# Patient Record
Sex: FEMALE | Race: WHITE | Employment: FULL TIME | URBAN - NONMETROPOLITAN AREA
[De-identification: names, ages, dates, MRNs, and addresses within clinical notes are randomized per-mention and may not be internally consistent; named-entity substitution may affect disease eponyms.]

---

## 2017-11-13 ENCOUNTER — HOSPITAL ENCOUNTER (OUTPATIENT)
Dept: GENERAL RADIOLOGY | Age: 27
Discharge: HOME | End: 2017-11-13
Attending: HOSPITALIST | Admitting: HOSPITALIST

## 2017-11-13 DIAGNOSIS — J93.83 SPONTANEOUS PNEUMOTHORAX: ICD-10-CM

## 2018-08-26 PROBLEM — R10.13 EPIGASTRIC ABDOMINAL PAIN: Status: ACTIVE | Noted: 2018-08-26

## 2018-08-30 ENCOUNTER — CARE COORDINATION (OUTPATIENT)
Dept: CASE MANAGEMENT | Age: 28
End: 2018-08-30

## 2018-08-30 DIAGNOSIS — R10.13 EPIGASTRIC ABDOMINAL PAIN: Primary | ICD-10-CM

## 2018-08-30 PROCEDURE — 1111F DSCHRG MED/CURRENT MED MERGE: CPT | Performed by: FAMILY MEDICINE

## 2018-08-30 NOTE — CARE COORDINATION
Jorge L 45 Transitions Initial Follow Up Call    Call within 2 business days of discharge:  Yes    Patient: Bala Boogie Patient : 1990   MRN: [id number]  Reason for Admission: There are no discharge diagnoses documented for the most recent discharge. Discharge Date: 18 RARS: Readmission Risk Score: 7     Spoke with:   patient    Facility:  [facility]    Non-face-to-face services provided:  Assessment and support for treatment adherence and [facility]    Care Transitions 24 Hour Call    Schedule Follow Up Appointment with PCP:  Completed  Do you have a copy of your discharge instructions?:  Yes  Do you have all of your prescriptions and are they filled?:  Yes  Have you been contacted by a [facility] Pharmacist?:  No  Have you scheduled your follow up appointment?:  Yes  How are you going to get to your appointment?:  Car - family or friend to transport  Were you discharged with any Home Care or Post Acute Services:  No  Do you feel like you have everything you need to keep you well at home?:  Yes  Care Transitions Interventions  No Identified Needs         Follow Up:  Spoke with patient. Reports that her stomach feels better. Denies nausea. Reports improved appetite. Patient reports that she is passing gas but is having some trouble getting her bowels to move. Encouraged increased fluid intake. Instructed on dietary changes to promote regularity. Instructed patient to contact her physician to discuss OTC stool softener, laxatives if needed. Reviewed discharge instructions and medications. Medication reconciliation completed. Patient denies any questions in regard to her medications. Patient reports that she is independent with her ADL's and that her care needs are being met. Patient has a follow up appointment with her PCP and has no issue with transportation. Denies any questions, equipment or resource needs. Denied the need for continued Care Transition. Confirmed that patient has CTC contact information. Encouraged her to call if needs arise. No further outreach planned.         Future Appointments  Date Time Provider Manoj Aliyah   9/6/2018 9:30 AM Yeison Chaudhry MD [facility]  PEDS FINN Daly RN

## 2018-09-05 ENCOUNTER — OFFICE VISIT (OUTPATIENT)
Dept: FAMILY MEDICINE CLINIC | Age: 28
End: 2018-09-05

## 2018-09-05 VITALS
DIASTOLIC BLOOD PRESSURE: 72 MMHG | BODY MASS INDEX: 19.33 KG/M2 | RESPIRATION RATE: 20 BRPM | HEART RATE: 52 BPM | OXYGEN SATURATION: 98 % | WEIGHT: 102.4 LBS | SYSTOLIC BLOOD PRESSURE: 110 MMHG | HEIGHT: 61 IN

## 2018-09-05 DIAGNOSIS — B16.9 ACUTE HEPATITIS B: ICD-10-CM

## 2018-09-05 DIAGNOSIS — R17 JAUNDICE: ICD-10-CM

## 2018-09-05 DIAGNOSIS — R11.14 BILIOUS VOMITING WITH NAUSEA: ICD-10-CM

## 2018-09-05 DIAGNOSIS — B16.9 ACUTE VIRAL HEPATITIS B WITHOUT COMA AND WITHOUT DELTA AGENT: Primary | ICD-10-CM

## 2018-09-05 DIAGNOSIS — Z23 NEED FOR PROPHYLACTIC VACCINATION AGAINST STREPTOCOCCUS PNEUMONIAE (PNEUMOCOCCUS): ICD-10-CM

## 2018-09-05 DIAGNOSIS — Z23 NEEDS FLU SHOT: ICD-10-CM

## 2018-09-05 LAB
A/G RATIO: 1.2 (ref 1.1–2.2)
ALBUMIN SERPL-MCNC: 3.2 G/DL (ref 3.4–5)
ALP BLD-CCNC: 221 U/L (ref 40–129)
ALT SERPL-CCNC: 728 U/L (ref 10–40)
ANION GAP SERPL CALCULATED.3IONS-SCNC: 14 MMOL/L (ref 3–16)
AST SERPL-CCNC: 1121 U/L (ref 15–37)
BILIRUB SERPL-MCNC: 24.2 MG/DL (ref 0–1)
BUN BLDV-MCNC: 6 MG/DL (ref 7–20)
CALCIUM SERPL-MCNC: 9.1 MG/DL (ref 8.3–10.6)
CHLORIDE BLD-SCNC: 94 MMOL/L (ref 99–110)
CO2: 28 MMOL/L (ref 21–32)
CREAT SERPL-MCNC: <0.5 MG/DL (ref 0.6–1.1)
GFR AFRICAN AMERICAN: >60
GFR NON-AFRICAN AMERICAN: >60
GLOBULIN: 2.6 G/DL
GLUCOSE BLD-MCNC: 92 MG/DL (ref 70–99)
HCT VFR BLD CALC: 42.4 % (ref 36–48)
HEMOGLOBIN: 14.4 G/DL (ref 12–16)
MCH RBC QN AUTO: 32.2 PG (ref 26–34)
MCHC RBC AUTO-ENTMCNC: 33.9 G/DL (ref 31–36)
MCV RBC AUTO: 95 FL (ref 80–100)
PDW BLD-RTO: 16.8 % (ref 12.4–15.4)
PLATELET # BLD: 170 K/UL (ref 135–450)
PMV BLD AUTO: 10.9 FL (ref 5–10.5)
POTASSIUM SERPL-SCNC: 3.7 MMOL/L (ref 3.5–5.1)
RBC # BLD: 4.47 M/UL (ref 4–5.2)
SODIUM BLD-SCNC: 136 MMOL/L (ref 136–145)
TOTAL PROTEIN: 5.8 G/DL (ref 6.4–8.2)
WBC # BLD: 6.6 K/UL (ref 4–11)

## 2018-09-05 PROCEDURE — G8427 DOCREV CUR MEDS BY ELIG CLIN: HCPCS | Performed by: NURSE PRACTITIONER

## 2018-09-05 PROCEDURE — 90472 IMMUNIZATION ADMIN EACH ADD: CPT | Performed by: NURSE PRACTITIONER

## 2018-09-05 PROCEDURE — G8420 CALC BMI NORM PARAMETERS: HCPCS | Performed by: NURSE PRACTITIONER

## 2018-09-05 PROCEDURE — 99203 OFFICE O/P NEW LOW 30 MIN: CPT | Performed by: NURSE PRACTITIONER

## 2018-09-05 PROCEDURE — 1111F DSCHRG MED/CURRENT MED MERGE: CPT | Performed by: NURSE PRACTITIONER

## 2018-09-05 PROCEDURE — 90732 PPSV23 VACC 2 YRS+ SUBQ/IM: CPT | Performed by: NURSE PRACTITIONER

## 2018-09-05 PROCEDURE — 4004F PT TOBACCO SCREEN RCVD TLK: CPT | Performed by: NURSE PRACTITIONER

## 2018-09-05 PROCEDURE — 90471 IMMUNIZATION ADMIN: CPT | Performed by: NURSE PRACTITIONER

## 2018-09-05 PROCEDURE — 90688 IIV4 VACCINE SPLT 0.5 ML IM: CPT | Performed by: NURSE PRACTITIONER

## 2018-09-05 PROCEDURE — 96372 THER/PROPH/DIAG INJ SC/IM: CPT | Performed by: NURSE PRACTITIONER

## 2018-09-05 RX ORDER — PROMETHAZINE HYDROCHLORIDE 25 MG/ML
12.5 INJECTION, SOLUTION INTRAMUSCULAR; INTRAVENOUS ONCE
Status: COMPLETED | OUTPATIENT
Start: 2018-09-05 | End: 2018-09-05

## 2018-09-05 RX ORDER — PROMETHAZINE HYDROCHLORIDE 25 MG/1
25 TABLET ORAL EVERY 8 HOURS PRN
Qty: 30 TABLET | Refills: 0 | Status: SHIPPED | OUTPATIENT
Start: 2018-09-05 | End: 2018-09-12

## 2018-09-05 RX ORDER — ONDANSETRON HYDROCHLORIDE 8 MG/1
8 TABLET, FILM COATED ORAL EVERY 8 HOURS PRN
Qty: 60 TABLET | Refills: 0 | Status: SHIPPED | OUTPATIENT
Start: 2018-09-05 | End: 2019-01-22

## 2018-09-05 RX ADMIN — PROMETHAZINE HYDROCHLORIDE 12.5 MG: 25 INJECTION, SOLUTION INTRAMUSCULAR; INTRAVENOUS at 11:33

## 2018-09-05 ASSESSMENT — ENCOUNTER SYMPTOMS
SHORTNESS OF BREATH: 0
COUGH: 0
NAUSEA: 1
BLOOD IN STOOL: 0
ABDOMINAL PAIN: 1
VOMITING: 1
WHEEZING: 0

## 2018-09-05 ASSESSMENT — PATIENT HEALTH QUESTIONNAIRE - PHQ9
SUM OF ALL RESPONSES TO PHQ QUESTIONS 1-9: 0
2. FEELING DOWN, DEPRESSED OR HOPELESS: 0
SUM OF ALL RESPONSES TO PHQ9 QUESTIONS 1 & 2: 0
1. LITTLE INTEREST OR PLEASURE IN DOING THINGS: 0
SUM OF ALL RESPONSES TO PHQ QUESTIONS 1-9: 0

## 2018-09-05 NOTE — PROGRESS NOTES
(ZOFRAN) 8 MG tablet Take 1 tablet by mouth every 8 hours as needed for Nausea or Vomiting 60 tablet 0    ibuprofen (ADVIL;MOTRIN) 400 MG tablet Take 1 tablet by mouth every 6 hours as needed for Pain or Fever 120 tablet 3     No current facility-administered medications for this visit. 1. Need for prophylactic vaccination against Streptococcus pneumoniae (pneumococcus)  given  - Pneumococcal polysaccharide vaccine 23-valent PPSV23    2. Acute viral hepatitis B without coma and without delta agent  Labs today  - CBC  - Comprehensive Metabolic Panel    3. Needs flu shot  given  - INFLUENZA, QUADV, 3 YRS AND OLDER, IM, MDV, 0.5ML (FLUZONE QUADV)    4. Bilious vomiting with nausea  Phenergan in office, phenergan at home. In crease zofran to 8mg  - promethazine (PHENERGAN) 25 MG tablet; Take 1 tablet by mouth every 8 hours as needed for Nausea  Dispense: 30 tablet; Refill: 0  - promethazine (PHENERGAN) injection 12.5 mg; Inject 0.5 mLs into the muscle once  - ondansetron (ZOFRAN) 8 MG tablet; Take 1 tablet by mouth every 8 hours as needed for Nausea or Vomiting  Dispense: 60 tablet; Refill: 0      5. Jaundice  Push fluids. FU with gi      Return in about 1 week (around 9/12/2018) for With primary care provider abd pain. Controlled substance monitoring (if applicable to pt.  Visit)             (Please note that portions of this note may have been completed with a voice recognition program. Efforts were made to edit the dictations but occasionally words are mis-transcribed.)

## 2018-09-05 NOTE — ASSESSMENT & PLAN NOTE
luther yellow- skin sallow/yellow    Patient reports that she has been having these symptoms for more than a week and a half. She sought care in the emergency department because \"my eyes were yellow\".   Where she was diagnosed with hepatitis B

## 2018-09-07 DIAGNOSIS — B16.9 ACUTE VIRAL HEPATITIS B WITHOUT COMA AND WITHOUT DELTA AGENT: Primary | ICD-10-CM

## 2018-09-07 DIAGNOSIS — B16.9 ACUTE VIRAL HEPATITIS B WITHOUT COMA AND WITHOUT DELTA AGENT: ICD-10-CM

## 2018-09-07 LAB
A/G RATIO: 1.7 (ref 1.1–2.2)
ALBUMIN SERPL-MCNC: 3.3 G/DL (ref 3.4–5)
ALP BLD-CCNC: 207 U/L (ref 40–129)
ALT SERPL-CCNC: 608 U/L (ref 10–40)
ANION GAP SERPL CALCULATED.3IONS-SCNC: 14 MMOL/L (ref 3–16)
AST SERPL-CCNC: 1056 U/L (ref 15–37)
BASOPHILS ABSOLUTE: 0.1 K/UL (ref 0–0.2)
BASOPHILS RELATIVE PERCENT: 1.3 %
BILIRUB SERPL-MCNC: 24.1 MG/DL (ref 0–1)
BILIRUBIN DIRECT: >10 MG/DL (ref 0–0.3)
BILIRUBIN, INDIRECT: ABNORMAL MG/DL (ref 0–1)
BUN BLDV-MCNC: 6 MG/DL (ref 7–20)
C-REACTIVE PROTEIN: 4.8 MG/L (ref 0–5.1)
CALCIUM SERPL-MCNC: 8.9 MG/DL (ref 8.3–10.6)
CHLORIDE BLD-SCNC: 98 MMOL/L (ref 99–110)
CO2: 27 MMOL/L (ref 21–32)
CREAT SERPL-MCNC: <0.5 MG/DL (ref 0.6–1.1)
EOSINOPHILS ABSOLUTE: 0.2 K/UL (ref 0–0.6)
EOSINOPHILS RELATIVE PERCENT: 2.5 %
GFR AFRICAN AMERICAN: >60
GFR NON-AFRICAN AMERICAN: >60
GLOBULIN: 2 G/DL
GLUCOSE BLD-MCNC: 96 MG/DL (ref 70–99)
HCT VFR BLD CALC: 41 % (ref 36–48)
HEMOGLOBIN: 14.2 G/DL (ref 12–16)
LYMPHOCYTES ABSOLUTE: 1.3 K/UL (ref 1–5.1)
LYMPHOCYTES RELATIVE PERCENT: 18.5 %
MCH RBC QN AUTO: 32.9 PG (ref 26–34)
MCHC RBC AUTO-ENTMCNC: 34.5 G/DL (ref 31–36)
MCV RBC AUTO: 95.3 FL (ref 80–100)
MONOCYTES ABSOLUTE: 0.7 K/UL (ref 0–1.3)
MONOCYTES RELATIVE PERCENT: 10.8 %
NEUTROPHILS ABSOLUTE: 4.5 K/UL (ref 1.7–7.7)
NEUTROPHILS RELATIVE PERCENT: 66.9 %
PDW BLD-RTO: 16.8 % (ref 12.4–15.4)
PLATELET # BLD: 169 K/UL (ref 135–450)
PMV BLD AUTO: 10.7 FL (ref 5–10.5)
POTASSIUM SERPL-SCNC: 3.6 MMOL/L (ref 3.5–5.1)
RBC # BLD: 4.31 M/UL (ref 4–5.2)
SODIUM BLD-SCNC: 139 MMOL/L (ref 136–145)
TOTAL PROTEIN: 5.3 G/DL (ref 6.4–8.2)
WBC # BLD: 6.8 K/UL (ref 4–11)

## 2018-09-12 ENCOUNTER — OFFICE VISIT (OUTPATIENT)
Dept: FAMILY MEDICINE CLINIC | Age: 28
End: 2018-09-12

## 2018-09-12 VITALS
RESPIRATION RATE: 20 BRPM | BODY MASS INDEX: 19.69 KG/M2 | DIASTOLIC BLOOD PRESSURE: 74 MMHG | WEIGHT: 104.2 LBS | OXYGEN SATURATION: 98 % | SYSTOLIC BLOOD PRESSURE: 110 MMHG | HEART RATE: 78 BPM

## 2018-09-12 DIAGNOSIS — B16.9 ACUTE VIRAL HEPATITIS B WITHOUT COMA AND WITHOUT DELTA AGENT: Primary | ICD-10-CM

## 2018-09-12 DIAGNOSIS — R10.13 EPIGASTRIC ABDOMINAL PAIN: ICD-10-CM

## 2018-09-12 DIAGNOSIS — R89.9 ABNORMAL LABORATORY TEST RESULT: Primary | ICD-10-CM

## 2018-09-12 DIAGNOSIS — R11.14 BILIOUS VOMITING WITH NAUSEA: ICD-10-CM

## 2018-09-12 DIAGNOSIS — R17 JAUNDICE: ICD-10-CM

## 2018-09-12 LAB
A/G RATIO: 1.6 (ref 1.1–2.2)
ALBUMIN SERPL-MCNC: 3.1 G/DL (ref 3.4–5)
ALP BLD-CCNC: 194 U/L (ref 40–129)
ALT SERPL-CCNC: 595 U/L (ref 10–40)
ANION GAP SERPL CALCULATED.3IONS-SCNC: 13 MMOL/L (ref 3–16)
AST SERPL-CCNC: 1009 U/L (ref 15–37)
BILIRUB SERPL-MCNC: 27.5 MG/DL (ref 0–1)
BILIRUBIN DIRECT: >10 MG/DL (ref 0–0.3)
BILIRUBIN, INDIRECT: ABNORMAL MG/DL (ref 0–1)
BUN BLDV-MCNC: 4 MG/DL (ref 7–20)
C-REACTIVE PROTEIN: 4.2 MG/L (ref 0–5.1)
CALCIUM SERPL-MCNC: 8.7 MG/DL (ref 8.3–10.6)
CHLORIDE BLD-SCNC: 96 MMOL/L (ref 99–110)
CO2: 27 MMOL/L (ref 21–32)
CREAT SERPL-MCNC: <0.5 MG/DL (ref 0.6–1.1)
GFR AFRICAN AMERICAN: >60
GFR NON-AFRICAN AMERICAN: >60
GLOBULIN: 2 G/DL
GLUCOSE BLD-MCNC: 80 MG/DL (ref 70–99)
HCT VFR BLD CALC: 41.9 % (ref 36–48)
HEMOGLOBIN: 14.1 G/DL (ref 12–16)
MCH RBC QN AUTO: 32.3 PG (ref 26–34)
MCHC RBC AUTO-ENTMCNC: 33.5 G/DL (ref 31–36)
MCV RBC AUTO: 96.2 FL (ref 80–100)
PDW BLD-RTO: 17.9 % (ref 12.4–15.4)
PLATELET # BLD: 183 K/UL (ref 135–450)
PLATELET SLIDE REVIEW: ADEQUATE
PMV BLD AUTO: 9.9 FL (ref 5–10.5)
POTASSIUM SERPL-SCNC: 3.4 MMOL/L (ref 3.5–5.1)
RBC # BLD: 4.36 M/UL (ref 4–5.2)
SLIDE REVIEW: ABNORMAL
SODIUM BLD-SCNC: 136 MMOL/L (ref 136–145)
TOTAL PROTEIN: 5.1 G/DL (ref 6.4–8.2)
WBC # BLD: 7 K/UL (ref 4–11)

## 2018-09-12 PROCEDURE — 4004F PT TOBACCO SCREEN RCVD TLK: CPT | Performed by: NURSE PRACTITIONER

## 2018-09-12 PROCEDURE — G8420 CALC BMI NORM PARAMETERS: HCPCS | Performed by: NURSE PRACTITIONER

## 2018-09-12 PROCEDURE — 99213 OFFICE O/P EST LOW 20 MIN: CPT | Performed by: NURSE PRACTITIONER

## 2018-09-12 PROCEDURE — G8427 DOCREV CUR MEDS BY ELIG CLIN: HCPCS | Performed by: NURSE PRACTITIONER

## 2018-09-12 PROCEDURE — 1111F DSCHRG MED/CURRENT MED MERGE: CPT | Performed by: NURSE PRACTITIONER

## 2018-09-12 ASSESSMENT — ENCOUNTER SYMPTOMS
CONSTIPATION: 1
NAUSEA: 1
ABDOMINAL PAIN: 1
PHOTOPHOBIA: 0
DIARRHEA: 0
COUGH: 0
EYES NEGATIVE: 1
SHORTNESS OF BREATH: 0
WHEEZING: 0
VOMITING: 0
RESPIRATORY NEGATIVE: 1

## 2018-09-12 NOTE — PROGRESS NOTES
SUBJECTIVE:    Edi Ache  1990  34 y.o.  female      Chief Complaint   Patient presents with    Other     Pt here for 1 week follow up, Pt is due to have repeat labwork today. Hepatic panel, CBC, CRP, and CMP     HPI    Jaundice  Still having jaundice. But reports less itching of skin since weather is cooler. Epigastric abdominal pain  Patient reports that she has less abdominal pain. It is made worse when she eats a larger meal or something that she has trouble digesting. She is in working on eating smaller more frequent meals and more bland foods for now. This is seemed to help the pain. She's had no vomiting or diarrhea. Bilious vomiting with nausea  Vomiting has improved. Less nausea with smaller more frequent meals. She reports that she has plenty of Phenergan and Zofran at home in case she needs it. Acute viral hepatitis B without coma and without delta agent  Patient reports that she is feeling significantly better. She is here for repeat lab work. She says she has been pushing fluids, getting plenty of rest.  She has been working and feels like she is doing better. She says she feels better now that the weather is cooler and so hot. She has a follow-up appointment scheduled with gastroenterology at the end of September. She is planning to keep that appointment      Current Outpatient Prescriptions on File Prior to Visit   Medication Sig Dispense Refill    promethazine (PHENERGAN) 25 MG tablet Take 1 tablet by mouth every 8 hours as needed for Nausea 30 tablet 0    ondansetron (ZOFRAN) 8 MG tablet Take 1 tablet by mouth every 8 hours as needed for Nausea or Vomiting 60 tablet 0    ibuprofen (ADVIL;MOTRIN) 400 MG tablet Take 1 tablet by mouth every 6 hours as needed for Pain or Fever 120 tablet 3     No current facility-administered medications on file prior to visit. Review of PMH, PSH, Family Hx, Allergies and updates made as needed.         Review of Systems is feeling significantly better. She is here for repeat lab work. She says she has been pushing fluids, getting plenty of rest.  She has been working and feels like she is doing better. She says she feels better now that the weather is cooler and so hot. She has a follow-up appointment scheduled with gastroenterology at the end of September. She is planning to keep that appointment         Relevant Orders    CBC    Comprehensive Metabolic Panel    HEPATIC FUNCTION PANEL    C-REACTIVE PROTEIN    Bilious vomiting with nausea     Vomiting has improved. Less nausea with smaller more frequent meals. She reports that she has plenty of Phenergan and Zofran at home in case she needs it. Relevant Medications    promethazine (PHENERGAN) 25 MG tablet    promethazine (PHENERGAN) injection 12.5 mg (Completed)    ondansetron (ZOFRAN) 8 MG tablet    Epigastric abdominal pain     Patient reports that she has less abdominal pain. It is made worse when she eats a larger meal or something that she has trouble digesting. She is in working on eating smaller more frequent meals and more bland foods for now. This is seemed to help the pain. She's had no vomiting or diarrhea. Jaundice     Still having jaundice. But reports less itching of skin since weather is cooler. Relevant Orders    CBC    Comprehensive Metabolic Panel    HEPATIC FUNCTION PANEL    C-REACTIVE PROTEIN          Current Outpatient Prescriptions   Medication Sig Dispense Refill    promethazine (PHENERGAN) 25 MG tablet Take 1 tablet by mouth every 8 hours as needed for Nausea 30 tablet 0    ondansetron (ZOFRAN) 8 MG tablet Take 1 tablet by mouth every 8 hours as needed for Nausea or Vomiting 60 tablet 0    ibuprofen (ADVIL;MOTRIN) 400 MG tablet Take 1 tablet by mouth every 6 hours as needed for Pain or Fever 120 tablet 3     No current facility-administered medications for this visit.       1. Acute viral hepatitis B without coma and

## 2018-09-17 DIAGNOSIS — R89.9 ABNORMAL LABORATORY TEST RESULT: ICD-10-CM

## 2018-09-17 LAB
HCT VFR BLD CALC: 41 % (ref 36–48)
HEMOGLOBIN: 13.8 G/DL (ref 12–16)
MCH RBC QN AUTO: 32.9 PG (ref 26–34)
MCHC RBC AUTO-ENTMCNC: 33.7 G/DL (ref 31–36)
MCV RBC AUTO: 97.5 FL (ref 80–100)
PDW BLD-RTO: 18.4 % (ref 12.4–15.4)
PLATELET # BLD: 155 K/UL (ref 135–450)
PMV BLD AUTO: 10.3 FL (ref 5–10.5)
RBC # BLD: 4.21 M/UL (ref 4–5.2)
WBC # BLD: 5.7 K/UL (ref 4–11)

## 2018-09-18 DIAGNOSIS — R89.9 ABNORMAL LABORATORY TEST RESULT: Primary | ICD-10-CM

## 2018-09-18 LAB
ALBUMIN SERPL-MCNC: 2.8 G/DL (ref 3.4–5)
ALP BLD-CCNC: 230 U/L (ref 40–129)
ALT SERPL-CCNC: 439 U/L (ref 10–40)
ANION GAP SERPL CALCULATED.3IONS-SCNC: 11 MMOL/L (ref 3–16)
AST SERPL-CCNC: 781 U/L (ref 15–37)
BILIRUB SERPL-MCNC: 23.8 MG/DL (ref 0–1)
BILIRUBIN DIRECT: >10 MG/DL (ref 0–0.3)
BILIRUBIN, INDIRECT: ABNORMAL MG/DL (ref 0–1)
BUN BLDV-MCNC: 6 MG/DL (ref 7–20)
CALCIUM SERPL-MCNC: 8.2 MG/DL (ref 8.3–10.6)
CHLORIDE BLD-SCNC: 102 MMOL/L (ref 99–110)
CO2: 25 MMOL/L (ref 21–32)
CREAT SERPL-MCNC: <0.5 MG/DL (ref 0.6–1.1)
GFR AFRICAN AMERICAN: >60
GFR NON-AFRICAN AMERICAN: >60
GLUCOSE BLD-MCNC: 81 MG/DL (ref 70–99)
POTASSIUM SERPL-SCNC: 3.7 MMOL/L (ref 3.5–5.1)
SODIUM BLD-SCNC: 138 MMOL/L (ref 136–145)
TOTAL PROTEIN: 4.7 G/DL (ref 6.4–8.2)

## 2018-10-10 ENCOUNTER — TELEPHONE (OUTPATIENT)
Dept: FAMILY MEDICINE CLINIC | Age: 28
End: 2018-10-10

## 2018-10-10 ENCOUNTER — OFFICE VISIT (OUTPATIENT)
Dept: FAMILY MEDICINE CLINIC | Age: 28
End: 2018-10-10
Payer: MEDICAID

## 2018-10-10 VITALS
BODY MASS INDEX: 21.23 KG/M2 | HEIGHT: 60 IN | HEART RATE: 48 BPM | RESPIRATION RATE: 16 BRPM | WEIGHT: 108.13 LBS | OXYGEN SATURATION: 100 % | SYSTOLIC BLOOD PRESSURE: 98 MMHG | DIASTOLIC BLOOD PRESSURE: 60 MMHG

## 2018-10-10 DIAGNOSIS — Z23 NEED FOR PROPHYLACTIC VACCINATION AGAINST DIPHTHERIA-TETANUS-PERTUSSIS (DTP): ICD-10-CM

## 2018-10-10 DIAGNOSIS — B16.9 ACUTE VIRAL HEPATITIS B WITHOUT COMA AND WITHOUT DELTA AGENT: ICD-10-CM

## 2018-10-10 DIAGNOSIS — M79.605 LEFT LEG PAIN: Primary | ICD-10-CM

## 2018-10-10 LAB
A/G RATIO: 1.8 (ref 1.1–2.2)
ALBUMIN SERPL-MCNC: 3.7 G/DL (ref 3.4–5)
ALP BLD-CCNC: 117 U/L (ref 40–129)
ALT SERPL-CCNC: 70 U/L (ref 10–40)
ANION GAP SERPL CALCULATED.3IONS-SCNC: 11 MMOL/L (ref 3–16)
AST SERPL-CCNC: 86 U/L (ref 15–37)
BILIRUB SERPL-MCNC: 3.1 MG/DL (ref 0–1)
BUN BLDV-MCNC: 4 MG/DL (ref 7–20)
CALCIUM SERPL-MCNC: 9.1 MG/DL (ref 8.3–10.6)
CHLORIDE BLD-SCNC: 101 MMOL/L (ref 99–110)
CO2: 27 MMOL/L (ref 21–32)
CREAT SERPL-MCNC: 0.7 MG/DL (ref 0.6–1.1)
D DIMER: <200 NG/ML DDU (ref 0–229)
GFR AFRICAN AMERICAN: >60
GFR NON-AFRICAN AMERICAN: >60
GLOBULIN: 2.1 G/DL
GLUCOSE BLD-MCNC: 69 MG/DL (ref 70–99)
HCT VFR BLD CALC: 37.8 % (ref 36–48)
HEMOGLOBIN: 13 G/DL (ref 12–16)
MCH RBC QN AUTO: 34.5 PG (ref 26–34)
MCHC RBC AUTO-ENTMCNC: 34.4 G/DL (ref 31–36)
MCV RBC AUTO: 100.3 FL (ref 80–100)
PDW BLD-RTO: 15.3 % (ref 12.4–15.4)
PLATELET # BLD: 198 K/UL (ref 135–450)
PMV BLD AUTO: 8.8 FL (ref 5–10.5)
POTASSIUM SERPL-SCNC: 4.1 MMOL/L (ref 3.5–5.1)
RBC # BLD: 3.77 M/UL (ref 4–5.2)
SODIUM BLD-SCNC: 139 MMOL/L (ref 136–145)
TOTAL PROTEIN: 5.8 G/DL (ref 6.4–8.2)
WBC # BLD: 7.7 K/UL (ref 4–11)

## 2018-10-10 PROCEDURE — 90715 TDAP VACCINE 7 YRS/> IM: CPT | Performed by: NURSE PRACTITIONER

## 2018-10-10 PROCEDURE — G8420 CALC BMI NORM PARAMETERS: HCPCS | Performed by: NURSE PRACTITIONER

## 2018-10-10 PROCEDURE — 90471 IMMUNIZATION ADMIN: CPT | Performed by: NURSE PRACTITIONER

## 2018-10-10 PROCEDURE — G8482 FLU IMMUNIZE ORDER/ADMIN: HCPCS | Performed by: NURSE PRACTITIONER

## 2018-10-10 PROCEDURE — 99214 OFFICE O/P EST MOD 30 MIN: CPT | Performed by: NURSE PRACTITIONER

## 2018-10-10 PROCEDURE — G8427 DOCREV CUR MEDS BY ELIG CLIN: HCPCS | Performed by: NURSE PRACTITIONER

## 2018-10-10 PROCEDURE — 4004F PT TOBACCO SCREEN RCVD TLK: CPT | Performed by: NURSE PRACTITIONER

## 2018-10-10 ASSESSMENT — ENCOUNTER SYMPTOMS
SHORTNESS OF BREATH: 0
DIARRHEA: 0
NAUSEA: 0
WHEEZING: 0
ABDOMINAL PAIN: 0
RESPIRATORY NEGATIVE: 1
COUGH: 0
VOMITING: 0
GASTROINTESTINAL NEGATIVE: 1

## 2018-10-10 NOTE — PROGRESS NOTES
SUBJECTIVE:    Manuel Burton  1990  34 y.o.  female      Chief Complaint   Patient presents with    Follow-up     Pt is here for 4 week follow up  Hep B     +swelling as well. Leg Pain    The incident occurred 3 to 5 days ago. There was no injury mechanism. The pain is present in the left leg. The quality of the pain is described as aching. The pain is at a severity of 4/10. The pain is mild. The pain has been constant since onset. Pertinent negatives include no inability to bear weight, loss of motion, loss of sensation, muscle weakness, numbness or tingling. She reports no foreign bodies present. The symptoms are aggravated by movement and palpation. She has tried acetaminophen and NSAIDs for the symptoms. The treatment provided mild relief. Feeling much better. States nausea is gone. Has stopped soda and this helped nausea. Acute viral hepatitis B without coma and without delta agent  Feeling much better. Current Outpatient Prescriptions on File Prior to Visit   Medication Sig Dispense Refill    promethazine (PHENERGAN) 25 MG tablet Take 25 mg by mouth every 6 hours as needed for Nausea      ondansetron (ZOFRAN) 8 MG tablet Take 1 tablet by mouth every 8 hours as needed for Nausea or Vomiting (Patient taking differently: Take 4 mg by mouth every 8 hours as needed for Nausea or Vomiting ) 60 tablet 0    ibuprofen (ADVIL;MOTRIN) 400 MG tablet Take 1 tablet by mouth every 6 hours as needed for Pain or Fever 120 tablet 3     No current facility-administered medications on file prior to visit. Review of PMH, PSH, Family Hx, Allergies and updates made as needed. Review of Systems   Constitutional: Negative. Negative for chills, diaphoresis and fever. Respiratory: Negative. Negative for cough, shortness of breath and wheezing. Cardiovascular: Negative. Negative for chest pain, palpitations and leg swelling. Gastrointestinal: Negative.   Negative for abdominal pain, diarrhea, nausea and vomiting. Endocrine: Negative. Genitourinary: Negative. Musculoskeletal: Positive for arthralgias and myalgias. Neurological: Negative. Negative for tingling and numbness. Hematological: Negative for adenopathy. Bruises/bleeds easily. Psychiatric/Behavioral: Negative. Negative for dysphoric mood. The patient is not nervous/anxious. All other systems reviewed and are negative. OBJECTIVE:    BP 98/60 (Site: Right Upper Arm, Position: Sitting, Cuff Size: Medium Adult)   Pulse (!) 48   Resp 16   Ht 5' (1.524 m)   Wt 108 lb 2 oz (49 kg)   SpO2 100%   BMI 21.12 kg/m²     Physical Exam   Constitutional: She is oriented to person, place, and time. She appears well-developed and well-nourished. No distress. HENT:   Head: Normocephalic and atraumatic. Right Ear: External ear normal.   Left Ear: External ear normal.   Nose: Nose normal.   Mouth/Throat: Oropharynx is clear and moist. No oropharyngeal exudate. Eyes: Pupils are equal, round, and reactive to light. Conjunctivae and EOM are normal. Right eye exhibits no discharge. Left eye exhibits no discharge. No scleral icterus. Neck: Normal range of motion. Neck supple. Cardiovascular: Normal rate, regular rhythm, normal heart sounds and intact distal pulses. Exam reveals no gallop and no friction rub. No murmur heard. Pulmonary/Chest: Effort normal and breath sounds normal. No respiratory distress. She has no wheezes. Abdominal: Soft. Bowel sounds are normal. She exhibits no distension. There is no tenderness. There is no rebound. Neurological: She is alert and oriented to person, place, and time. Skin: Skin is warm and dry. She is not diaphoretic. Psychiatric: She has a normal mood and affect. Her behavior is normal. Judgment and thought content normal.   Vitals reviewed.       ASSESSMENT/PLAN:    Problem List     Acute viral hepatitis B without coma and without delta agent     Feeling

## 2018-10-12 ENCOUNTER — HOSPITAL ENCOUNTER (OUTPATIENT)
Dept: ULTRASOUND IMAGING | Age: 28
Discharge: HOME | End: 2018-10-12
Payer: MEDICAID

## 2018-10-12 DIAGNOSIS — M79.605 LEFT LEG PAIN: ICD-10-CM

## 2018-10-12 PROCEDURE — 93971 EXTREMITY STUDY: CPT

## 2018-10-31 ENCOUNTER — HOSPITAL ENCOUNTER (OUTPATIENT)
Age: 28
Discharge: HOME | End: 2018-10-31
Payer: MEDICAID

## 2018-10-31 DIAGNOSIS — R89.9 ABNORMAL LABORATORY TEST RESULT: ICD-10-CM

## 2018-10-31 DIAGNOSIS — B16.9 ACUTE VIRAL HEPATITIS B WITHOUT COMA AND WITHOUT DELTA AGENT: ICD-10-CM

## 2018-10-31 LAB
ALBUMIN SERPL-MCNC: 3.8 GM/DL (ref 3.4–5)
ALP BLD-CCNC: 75 IU/L (ref 40–129)
ALT SERPL-CCNC: 27 U/L (ref 10–40)
ANION GAP SERPL CALCULATED.3IONS-SCNC: 7 MMOL/L (ref 4–16)
AST SERPL-CCNC: 31 IU/L (ref 15–37)
BACTERIA: ABNORMAL /HPF
BASOPHILS ABSOLUTE: 0 K/CU MM
BASOPHILS RELATIVE PERCENT: 0.6 % (ref 0–1)
BILIRUB SERPL-MCNC: 1.3 MG/DL (ref 0–1)
BILIRUBIN DIRECT: 0.6 MG/DL (ref 0–0.3)
BILIRUBIN URINE: NEGATIVE MG/DL
BLOOD, URINE: ABNORMAL
BUN BLDV-MCNC: 3 MG/DL (ref 6–23)
CALCIUM SERPL-MCNC: 9.2 MG/DL (ref 8.3–10.6)
CAST TYPE: ABNORMAL /HPF
CHLORIDE BLD-SCNC: 101 MMOL/L (ref 99–110)
CLARITY: CLEAR
CO2: 30 MMOL/L (ref 21–32)
COLOR: YELLOW
CREAT SERPL-MCNC: 0.8 MG/DL (ref 0.6–1.1)
CREATININE URINE: 89.3 MG/DL (ref 28–217)
CRYSTAL TYPE: ABNORMAL /HPF
DIFFERENTIAL TYPE: ABNORMAL
EOSINOPHILS ABSOLUTE: 0.1 K/CU MM
EOSINOPHILS RELATIVE PERCENT: 2.1 % (ref 0–3)
EPITHELIAL CELLS, UA: ABNORMAL /HPF
GFR AFRICAN AMERICAN: >60 ML/MIN/1.73M2
GFR NON-AFRICAN AMERICAN: >60 ML/MIN/1.73M2
GLUCOSE FASTING: 87 MG/DL (ref 70–99)
GLUCOSE, URINE: NEGATIVE MG/DL
HCT VFR BLD CALC: 40.8 % (ref 37–47)
HEMOGLOBIN: 13.6 GM/DL (ref 12.5–16)
IMMATURE NEUTROPHIL %: 0.7 % (ref 0–0.43)
INR BLD: 1.06 INDEX
KETONES, URINE: NEGATIVE MG/DL
LEUKOCYTE ESTERASE, URINE: NEGATIVE
LYMPHOCYTES ABSOLUTE: 2.1 K/CU MM
LYMPHOCYTES RELATIVE PERCENT: 31.4 % (ref 24–44)
MCH RBC QN AUTO: 33.1 PG (ref 27–31)
MCHC RBC AUTO-ENTMCNC: 33.3 % (ref 32–36)
MCV RBC AUTO: 99.3 FL (ref 78–100)
MONOCYTES ABSOLUTE: 0.5 K/CU MM
MONOCYTES RELATIVE PERCENT: 6.8 % (ref 0–4)
MUCUS: NEGATIVE HPF
NITRITE URINE, QUANTITATIVE: NEGATIVE
PDW BLD-RTO: 12.4 % (ref 11.7–14.9)
PH, URINE: 6 (ref 5–8)
PLATELET # BLD: 191 K/CU MM (ref 140–440)
PMV BLD AUTO: 10.2 FL (ref 7.5–11.1)
POTASSIUM SERPL-SCNC: 3.9 MMOL/L (ref 3.5–5.1)
PROT/CREAT RATIO, UR: 0.1
PROTEIN UA: NEGATIVE MG/DL
PROTHROMBIN TIME: 12.1 SECONDS (ref 9.12–12.5)
RBC # BLD: 4.11 M/CU MM (ref 4.2–5.4)
RBC URINE: ABNORMAL /HPF (ref 0–6)
SEGMENTED NEUTROPHILS ABSOLUTE COUNT: 4 K/CU MM
SEGMENTED NEUTROPHILS RELATIVE PERCENT: 58.4 % (ref 36–66)
SODIUM BLD-SCNC: 138 MMOL/L (ref 135–145)
SPECIFIC GRAVITY UA: 1.01 (ref 1–1.03)
TOTAL IMMATURE NEUTOROPHIL: 0.05 K/CU MM
TOTAL PROTEIN: 6.3 GM/DL (ref 6.4–8.2)
URINE TOTAL PROTEIN: 6.6 MG/DL
UROBILINOGEN, URINE: 0.2 MG/DL (ref 0.2–1)
VOLUME, (UVOL): 12 ML (ref 10–12)
WBC # BLD: 6.8 K/CU MM (ref 4–10.5)
WBC UA: ABNORMAL /HPF (ref 0–5)

## 2018-10-31 PROCEDURE — 85610 PROTHROMBIN TIME: CPT

## 2018-10-31 PROCEDURE — 82570 ASSAY OF URINE CREATININE: CPT

## 2018-10-31 PROCEDURE — 85025 COMPLETE CBC W/AUTO DIFF WBC: CPT

## 2018-10-31 PROCEDURE — 87517 HEPATITIS B DNA QUANT: CPT

## 2018-10-31 PROCEDURE — 36415 COLL VENOUS BLD VENIPUNCTURE: CPT

## 2018-10-31 PROCEDURE — 82248 BILIRUBIN DIRECT: CPT

## 2018-10-31 PROCEDURE — 80053 COMPREHEN METABOLIC PANEL: CPT

## 2018-10-31 PROCEDURE — 84156 ASSAY OF PROTEIN URINE: CPT

## 2018-10-31 PROCEDURE — 81001 URINALYSIS AUTO W/SCOPE: CPT

## 2018-11-03 LAB
HBV DNA ULTRA QNT INTERP REALT: DETECTED
HBV DNA ULTRA QNT REALTIME PCR: 1.7
HBV DNA ULTRA QNT REALTIME PCR: 49

## 2018-11-07 PROBLEM — F17.200 SMOKING: Status: ACTIVE | Noted: 2018-11-07

## 2018-12-31 ENCOUNTER — HOSPITAL ENCOUNTER (OUTPATIENT)
Age: 28
Discharge: HOME | End: 2018-12-31
Payer: MEDICAID

## 2018-12-31 LAB
ALBUMIN SERPL-MCNC: 3.5 GM/DL (ref 3.4–5)
ALP BLD-CCNC: 60 IU/L (ref 40–129)
ALT SERPL-CCNC: 12 U/L (ref 10–40)
AST SERPL-CCNC: 16 IU/L (ref 15–37)
BILIRUB SERPL-MCNC: 0.6 MG/DL (ref 0–1)
BILIRUBIN DIRECT: 0.2 MG/DL (ref 0–0.3)
BILIRUBIN, INDIRECT: 0.4 MG/DL (ref 0–0.7)
TOTAL PROTEIN: 5.7 GM/DL (ref 6.4–8.2)

## 2018-12-31 PROCEDURE — 80076 HEPATIC FUNCTION PANEL: CPT

## 2018-12-31 PROCEDURE — 36415 COLL VENOUS BLD VENIPUNCTURE: CPT

## 2019-01-22 ENCOUNTER — OFFICE VISIT (OUTPATIENT)
Dept: FAMILY MEDICINE CLINIC | Age: 29
End: 2019-01-22
Payer: MEDICAID

## 2019-01-22 VITALS
OXYGEN SATURATION: 98 % | RESPIRATION RATE: 20 BRPM | BODY MASS INDEX: 21.17 KG/M2 | DIASTOLIC BLOOD PRESSURE: 60 MMHG | WEIGHT: 108.4 LBS | HEART RATE: 52 BPM | SYSTOLIC BLOOD PRESSURE: 110 MMHG

## 2019-01-22 DIAGNOSIS — B16.9 ACUTE VIRAL HEPATITIS B WITHOUT COMA AND WITHOUT DELTA AGENT: ICD-10-CM

## 2019-01-22 DIAGNOSIS — F17.200 SMOKING: ICD-10-CM

## 2019-01-22 PROBLEM — R17 JAUNDICE: Status: RESOLVED | Noted: 2018-09-05 | Resolved: 2019-01-22

## 2019-01-22 PROBLEM — R11.14 BILIOUS VOMITING WITH NAUSEA: Status: RESOLVED | Noted: 2018-09-05 | Resolved: 2019-01-22

## 2019-01-22 PROCEDURE — G8482 FLU IMMUNIZE ORDER/ADMIN: HCPCS | Performed by: NURSE PRACTITIONER

## 2019-01-22 PROCEDURE — G8427 DOCREV CUR MEDS BY ELIG CLIN: HCPCS | Performed by: NURSE PRACTITIONER

## 2019-01-22 PROCEDURE — G8420 CALC BMI NORM PARAMETERS: HCPCS | Performed by: NURSE PRACTITIONER

## 2019-01-22 PROCEDURE — 99213 OFFICE O/P EST LOW 20 MIN: CPT | Performed by: NURSE PRACTITIONER

## 2019-01-22 PROCEDURE — 4004F PT TOBACCO SCREEN RCVD TLK: CPT | Performed by: NURSE PRACTITIONER

## 2019-01-22 ASSESSMENT — ENCOUNTER SYMPTOMS
ABDOMINAL PAIN: 0
NAUSEA: 0
COUGH: 0
BLOOD IN STOOL: 0
DIARRHEA: 0
ABDOMINAL DISTENTION: 0
SHORTNESS OF BREATH: 0
GASTROINTESTINAL NEGATIVE: 1
VOMITING: 0
WHEEZING: 0
RESPIRATORY NEGATIVE: 1

## 2019-03-01 ENCOUNTER — HOSPITAL ENCOUNTER (EMERGENCY)
Age: 29
Discharge: HOME | End: 2019-03-01
Attending: EMERGENCY MEDICINE
Payer: MEDICAID

## 2019-03-01 VITALS
HEIGHT: 60 IN | OXYGEN SATURATION: 98 % | SYSTOLIC BLOOD PRESSURE: 130 MMHG | TEMPERATURE: 98.9 F | BODY MASS INDEX: 21.2 KG/M2 | HEART RATE: 70 BPM | WEIGHT: 108 LBS | RESPIRATION RATE: 18 BRPM | DIASTOLIC BLOOD PRESSURE: 72 MMHG

## 2019-03-01 DIAGNOSIS — K13.21 LEUKOPLAKIA OF ORAL MUCOSA AND TONGUE: ICD-10-CM

## 2019-03-01 DIAGNOSIS — J06.9 VIRAL URI WITH COUGH: Primary | ICD-10-CM

## 2019-03-01 PROCEDURE — 99283 EMERGENCY DEPT VISIT LOW MDM: CPT

## 2019-03-01 RX ORDER — LORATADINE 10 MG/1
10 TABLET ORAL DAILY
Qty: 30 TABLET | Refills: 0 | Status: SHIPPED | OUTPATIENT
Start: 2019-03-01 | End: 2020-10-27 | Stop reason: ALTCHOICE

## 2019-03-01 RX ORDER — BENZONATATE 100 MG/1
100 CAPSULE ORAL 3 TIMES DAILY PRN
Qty: 10 CAPSULE | Refills: 0 | Status: SHIPPED | OUTPATIENT
Start: 2019-03-01 | End: 2019-03-08

## 2019-03-01 RX ORDER — ALBUTEROL SULFATE 90 UG/1
2 AEROSOL, METERED RESPIRATORY (INHALATION) EVERY 4 HOURS PRN
Qty: 1 INHALER | Refills: 1 | Status: SHIPPED | OUTPATIENT
Start: 2019-03-01 | End: 2020-10-27

## 2019-03-01 ASSESSMENT — PAIN DESCRIPTION - LOCATION: LOCATION: THROAT;MOUTH

## 2019-03-01 ASSESSMENT — PAIN DESCRIPTION - DESCRIPTORS: DESCRIPTORS: BURNING;SHARP

## 2019-03-01 ASSESSMENT — PAIN SCALES - GENERAL: PAINLEVEL_OUTOF10: 5

## 2019-03-13 ENCOUNTER — HOSPITAL ENCOUNTER (OUTPATIENT)
Age: 29
Discharge: HOME | End: 2019-03-13
Payer: MEDICAID

## 2019-03-13 LAB
HBV SURFACE AB TITR SER: >1000 {TITER}
HEPATITIS B SURFACE ANTIGEN: NON REACTIVE

## 2019-03-13 PROCEDURE — 87340 HEPATITIS B SURFACE AG IA: CPT

## 2019-03-13 PROCEDURE — 86706 HEP B SURFACE ANTIBODY: CPT

## 2019-03-13 PROCEDURE — 36415 COLL VENOUS BLD VENIPUNCTURE: CPT

## 2019-07-24 RX ORDER — NICOTINE 21 MG/24HR
1 PATCH, TRANSDERMAL 24 HOURS TRANSDERMAL DAILY
Qty: 30 PATCH | Refills: 1 | Status: SHIPPED | OUTPATIENT
Start: 2019-07-24 | End: 2019-08-20 | Stop reason: ALTCHOICE

## 2019-08-20 RX ORDER — NICOTINE 21 MG/24HR
1 PATCH, TRANSDERMAL 24 HOURS TRANSDERMAL DAILY
Qty: 30 PATCH | Refills: 1 | Status: SHIPPED | OUTPATIENT
Start: 2019-08-20 | End: 2020-10-27

## 2019-08-27 ENCOUNTER — OFFICE VISIT (OUTPATIENT)
Dept: FAMILY MEDICINE CLINIC | Age: 29
End: 2019-08-27
Payer: MEDICAID

## 2019-08-27 VITALS
DIASTOLIC BLOOD PRESSURE: 60 MMHG | BODY MASS INDEX: 20.7 KG/M2 | HEART RATE: 50 BPM | RESPIRATION RATE: 20 BRPM | SYSTOLIC BLOOD PRESSURE: 94 MMHG | WEIGHT: 106 LBS

## 2019-08-27 DIAGNOSIS — Z71.6 ENCOUNTER FOR SMOKING CESSATION COUNSELING: ICD-10-CM

## 2019-08-27 DIAGNOSIS — B16.9 ACUTE VIRAL HEPATITIS B WITHOUT COMA AND WITHOUT DELTA AGENT: ICD-10-CM

## 2019-08-27 PROCEDURE — G8427 DOCREV CUR MEDS BY ELIG CLIN: HCPCS | Performed by: NURSE PRACTITIONER

## 2019-08-27 PROCEDURE — G8420 CALC BMI NORM PARAMETERS: HCPCS | Performed by: NURSE PRACTITIONER

## 2019-08-27 PROCEDURE — 1036F TOBACCO NON-USER: CPT | Performed by: NURSE PRACTITIONER

## 2019-08-27 PROCEDURE — 99213 OFFICE O/P EST LOW 20 MIN: CPT | Performed by: NURSE PRACTITIONER

## 2019-08-27 ASSESSMENT — ENCOUNTER SYMPTOMS
SHORTNESS OF BREATH: 0
COUGH: 0
RESPIRATORY NEGATIVE: 1
DIARRHEA: 0
GASTROINTESTINAL NEGATIVE: 1
WHEEZING: 0
VOMITING: 0

## 2019-08-27 ASSESSMENT — PATIENT HEALTH QUESTIONNAIRE - PHQ9
2. FEELING DOWN, DEPRESSED OR HOPELESS: 0
SUM OF ALL RESPONSES TO PHQ QUESTIONS 1-9: 0
1. LITTLE INTEREST OR PLEASURE IN DOING THINGS: 0
SUM OF ALL RESPONSES TO PHQ QUESTIONS 1-9: 0
SUM OF ALL RESPONSES TO PHQ9 QUESTIONS 1 & 2: 0

## 2019-08-27 NOTE — PATIENT INSTRUCTIONS
lower too. How would quitting help others in your life? When you quit smoking, you improve the health of everyone who now breathes in your smoke. · Their heart, lung, and cancer risks drop, much like yours. · They are sick less. For babies and small children, living smoke-free means they're less likely to have ear infections, pneumonia, and bronchitis. · If you're a woman who is or will be pregnant someday, quitting smoking means a healthier . · Children who are close to you are less likely to become adult smokers. Where can you learn more? Go to https://HiConversionpepiceweb.[email or web address] org and sign in to your [employer] account. Enter [phone] in the [employer] box to learn more about \"Learning About Benefits From Quitting Smoking. \"     If you do not have an account, please click on the \"Sign Up Now\" link. Current as of: 2018  Content Version: 12.1  © 7821-6341 Healthwise, Incorporated. Care instructions adapted under license by [facility] ([facility]). If you have questions about a medical condition or this instruction, always ask your healthcare professional. [place] [address] any warranty or liability for your use of this information.

## 2019-08-27 NOTE — PROGRESS NOTES
SUBJECTIVE:    Eli Griffiths  1990  34 y.o.  female      Chief Complaint   Patient presents with    Other     Pt states she recently started nicotine patches and is just following up. HPI  Acute viral hepatitis B without coma and without delta agent  The patient denies abdominal or flank pain, anorexia, nausea or vomiting, dysphagia, change in bowel habits or black or bloody stools or weight loss. Feeling well. No current concerns. Encounter for smoking cessation counseling  Doing great with [facility]. Not smoked in 33 days. Feels much better. Food tasting better. Eating more. Breathing much better. Current Outpatient Medications on File Prior to Visit   Medication Sig Dispense Refill    nicotine (NICODERM CQ) 14 MG/24HR Place 1 patch onto the skin daily 30 patch 1    lidocaine viscous (XYLOCAINE) 2 % solution Take 15 mLs by mouth every 3 hours as needed for Irritation 100 mL 0    albuterol sulfate HFA (PROVENTIL HFA) 108 (90 Base) MCG/ACT inhaler Inhale 2 puffs into the lungs every 4 hours as needed for Wheezing or Shortness of Breath With spacer (and mask if indicated). Thanks. 1 Inhaler 1    loratadine (CLARITIN) 10 MG tablet Take 1 tablet by mouth daily 30 tablet 0     No current facility-administered medications on file prior to visit. Review of PMH, PSH, Family Hx, Allergies and updates made as needed. PHQ Scores 8/27/2019 9/5/2018   PHQ2 Score 0 0   PHQ9 Score 0 0     Interpretation of Total Score Depression Severity: 1-4 = Minimal depression, 5-9 = Mild depression, 10-14 = Moderate depression, 15-19 = Moderately severe depression, 20-27 = Severe depression    Review of Systems   Constitutional: Negative. Negative for chills, diaphoresis and fever. Respiratory: Negative. Negative for cough, shortness of breath and wheezing. Cardiovascular: Negative. Negative for chest pain, palpitations and leg swelling. Gastrointestinal: Negative.   Negative for

## 2020-10-27 ENCOUNTER — OFFICE VISIT (OUTPATIENT)
Dept: FAMILY MEDICINE CLINIC | Age: 30
End: 2020-10-27
Payer: MEDICAID

## 2020-10-27 VITALS
BODY MASS INDEX: 22.19 KG/M2 | OXYGEN SATURATION: 98 % | WEIGHT: 113 LBS | SYSTOLIC BLOOD PRESSURE: 100 MMHG | DIASTOLIC BLOOD PRESSURE: 62 MMHG | RESPIRATION RATE: 20 BRPM | HEIGHT: 60 IN | TEMPERATURE: 97.5 F | HEART RATE: 51 BPM

## 2020-10-27 PROBLEM — K21.9 GASTROESOPHAGEAL REFLUX DISEASE WITHOUT ESOPHAGITIS: Status: ACTIVE | Noted: 2020-10-27

## 2020-10-27 LAB
ALBUMIN SERPL-MCNC: 4.1 G/DL (ref 3.4–5)
ALP BLD-CCNC: 54 U/L (ref 40–129)
ALT SERPL-CCNC: 8 U/L (ref 10–40)
ANION GAP SERPL CALCULATED.3IONS-SCNC: 8 MMOL/L (ref 3–16)
AST SERPL-CCNC: 11 U/L (ref 15–37)
BASOPHILS ABSOLUTE: 0 K/UL (ref 0–0.2)
BASOPHILS RELATIVE PERCENT: 0.4 %
BILIRUB SERPL-MCNC: 0.5 MG/DL (ref 0–1)
BILIRUBIN DIRECT: <0.2 MG/DL (ref 0–0.3)
BILIRUBIN, INDIRECT: ABNORMAL MG/DL (ref 0–1)
BUN BLDV-MCNC: 6 MG/DL (ref 7–20)
CALCIUM SERPL-MCNC: 9.2 MG/DL (ref 8.3–10.6)
CHLORIDE BLD-SCNC: 105 MMOL/L (ref 99–110)
CHOLESTEROL, FASTING: 112 MG/DL (ref 0–199)
CO2: 26 MMOL/L (ref 21–32)
CREAT SERPL-MCNC: 0.8 MG/DL (ref 0.6–1.1)
EOSINOPHILS ABSOLUTE: 0.1 K/UL (ref 0–0.6)
EOSINOPHILS RELATIVE PERCENT: 1.4 %
GFR AFRICAN AMERICAN: >60
GFR NON-AFRICAN AMERICAN: >60
GLUCOSE BLD-MCNC: 86 MG/DL (ref 70–99)
HCT VFR BLD CALC: 39.9 % (ref 36–48)
HDLC SERPL-MCNC: 51 MG/DL (ref 40–60)
HEMOGLOBIN: 13.8 G/DL (ref 12–16)
HEPATITIS C ANTIBODY INTERPRETATION: NORMAL
LDL CHOLESTEROL CALCULATED: 49 MG/DL
LYMPHOCYTES ABSOLUTE: 2.4 K/UL (ref 1–5.1)
LYMPHOCYTES RELATIVE PERCENT: 38.6 %
MCH RBC QN AUTO: 31.9 PG (ref 26–34)
MCHC RBC AUTO-ENTMCNC: 34.4 G/DL (ref 31–36)
MCV RBC AUTO: 92.6 FL (ref 80–100)
MONOCYTES ABSOLUTE: 0.4 K/UL (ref 0–1.3)
MONOCYTES RELATIVE PERCENT: 6.6 %
NEUTROPHILS ABSOLUTE: 3.3 K/UL (ref 1.7–7.7)
NEUTROPHILS RELATIVE PERCENT: 53 %
PDW BLD-RTO: 13 % (ref 12.4–15.4)
PLATELET # BLD: 195 K/UL (ref 135–450)
PMV BLD AUTO: 9.3 FL (ref 5–10.5)
POTASSIUM SERPL-SCNC: 4.7 MMOL/L (ref 3.5–5.1)
RBC # BLD: 4.31 M/UL (ref 4–5.2)
SODIUM BLD-SCNC: 139 MMOL/L (ref 136–145)
TOTAL PROTEIN: 6 G/DL (ref 6.4–8.2)
TRIGLYCERIDE, FASTING: 59 MG/DL (ref 0–150)
TSH SERPL DL<=0.05 MIU/L-ACNC: 4.18 UIU/ML (ref 0.27–4.2)
VLDLC SERPL CALC-MCNC: 12 MG/DL
WBC # BLD: 6.2 K/UL (ref 4–11)

## 2020-10-27 PROCEDURE — 99214 OFFICE O/P EST MOD 30 MIN: CPT | Performed by: NURSE PRACTITIONER

## 2020-10-27 PROCEDURE — G8420 CALC BMI NORM PARAMETERS: HCPCS | Performed by: NURSE PRACTITIONER

## 2020-10-27 PROCEDURE — G8427 DOCREV CUR MEDS BY ELIG CLIN: HCPCS | Performed by: NURSE PRACTITIONER

## 2020-10-27 PROCEDURE — G8482 FLU IMMUNIZE ORDER/ADMIN: HCPCS | Performed by: NURSE PRACTITIONER

## 2020-10-27 PROCEDURE — 1036F TOBACCO NON-USER: CPT | Performed by: NURSE PRACTITIONER

## 2020-10-27 PROCEDURE — 90471 IMMUNIZATION ADMIN: CPT | Performed by: NURSE PRACTITIONER

## 2020-10-27 PROCEDURE — 90686 IIV4 VACC NO PRSV 0.5 ML IM: CPT | Performed by: NURSE PRACTITIONER

## 2020-10-27 RX ORDER — FAMOTIDINE 20 MG/1
20 TABLET, FILM COATED ORAL 2 TIMES DAILY
Qty: 60 TABLET | Refills: 3 | Status: SHIPPED | OUTPATIENT
Start: 2020-10-27

## 2020-10-27 ASSESSMENT — ENCOUNTER SYMPTOMS
PHOTOPHOBIA: 0
BACK PAIN: 0
EYE PAIN: 0
BLOOD IN STOOL: 0
TROUBLE SWALLOWING: 0
COUGH: 0
SINUS PAIN: 0
SHORTNESS OF BREATH: 0
VOMITING: 0
RHINORRHEA: 0
NAUSEA: 0
WHEEZING: 0
CONSTIPATION: 0
ABDOMINAL PAIN: 0
SORE THROAT: 0
SINUS PRESSURE: 0
DIARRHEA: 0
CHEST TIGHTNESS: 0

## 2020-10-27 ASSESSMENT — PATIENT HEALTH QUESTIONNAIRE - PHQ9
SUM OF ALL RESPONSES TO PHQ QUESTIONS 1-9: 0
SUM OF ALL RESPONSES TO PHQ QUESTIONS 1-9: 0
SUM OF ALL RESPONSES TO PHQ9 QUESTIONS 1 & 2: 0
1. LITTLE INTEREST OR PLEASURE IN DOING THINGS: 0
2. FEELING DOWN, DEPRESSED OR HOPELESS: 0
SUM OF ALL RESPONSES TO PHQ QUESTIONS 1-9: 0

## 2020-10-27 NOTE — PROGRESS NOTES
10/27/20    Chief Complaint   Patient presents with    Annual Exam     Pt here for 1 year follow up, Previous Hector Pt.  Flu Vaccine       Veronica Elias, (1990), is a 31 y.o. female, is here for evaluation of the following medical concerns:    HPI    She is here to establish care with me as her new PCP. Previous PCP TRISTAN Young, ANA. Last seen 8/2019    Acute viral hepatitis B without coma and without delta agent  The patient denies abdominal or flank pain, anorexia, nausea or vomiting, dysphagia, change in bowel habits or black or bloody stools or weight loss. Feeling well. No current concerns. Did follow up with GI and recovered      Previous smoker  1 year since last cigarette. Women's Health:  Has not had PAP for 5 years  Dr. Mateo Bhatt in [facility]. History of endometriosis  Surgery in 2015 \"to burn off endometriosis\". GERD:  She has been taking an OTC to control her GERD symptoms     Review of Systems   Constitutional: Negative for activity change, appetite change, chills, diaphoresis, fatigue, fever and unexpected weight change. HENT: Negative for congestion, dental problem, ear pain, hearing loss, mouth sores, nosebleeds, postnasal drip, rhinorrhea, sinus pressure, sinus pain, sore throat, tinnitus and trouble swallowing. Eyes: Negative for photophobia, pain and visual disturbance. Respiratory: Negative for cough, chest tightness, shortness of breath and wheezing. Cardiovascular: Negative for chest pain, palpitations and leg swelling. Gastrointestinal: Negative for abdominal pain, blood in stool, constipation, diarrhea, nausea and vomiting. Endocrine: Negative for cold intolerance, heat intolerance, polydipsia and polyuria. Genitourinary: Positive for menstrual problem and pelvic pain. Negative for difficulty urinating, dysuria, flank pain, frequency, hematuria and urgency.         History of endometriosis    Musculoskeletal: Negative for arthralgias, back pain, gait problem, joint swelling, myalgias, neck pain and neck stiffness. Skin: Negative for pallor, rash and wound. Allergic/Immunologic: Negative for environmental allergies, food allergies and immunocompromised state. Neurological: Negative for dizziness, syncope, weakness, light-headedness, numbness and headaches. Hematological: Negative for adenopathy. Does not bruise/bleed easily. Psychiatric/Behavioral: Negative for confusion, decreased concentration, self-injury, sleep disturbance and suicidal ideas. The patient is not nervous/anxious. Prior to Visit Medications    Medication Sig Taking? Authorizing Provider   nicotine (NICODERM CQ) 7 MG/24HR Place 1 patch onto the skin every 24 hours  JULIENNE Morelos CNP   nicotine (NICODERM CQ) 14 MG/24HR Place 1 patch onto the skin daily  JULIENNE Morelos CNP   lidocaine viscous (XYLOCAINE) 2 % solution Take 15 mLs by mouth every 3 hours as needed for Irritation  Narda Hernandez MD   albuterol sulfate HFA (PROVENTIL HFA) 108 (90 Base) MCG/ACT inhaler Inhale 2 puffs into the lungs every 4 hours as needed for Wheezing or Shortness of Breath With spacer (and mask if indicated). Thanks.   Narda Hernandez MD   loratadine (CLARITIN) 10 MG tablet Take 1 tablet by mouth daily  Narda Hernandez MD        Allergies   Allergen Reactions    Morphine Nausea And Vomiting    Percocet [Oxycodone-Acetaminophen] Nausea And Vomiting       Past Medical History:   Diagnosis Date    GERD (gastroesophageal reflux disease)     Pneumothorax, spontaneous, tension     right side, 2017 with chest tube       Past Surgical History:   Procedure Laterality Date    CHEST TUBE INSERTION Right 2017    spontaneous pneumo    ENDOMETRIAL ABLATION         Social History     Tobacco Use    Smoking status: Former Smoker     Packs/day: 0.50     Years: 8.00     Pack years: 4.00     Types: Cigarettes     Last attempt to quit: 2019     Years since quittin.2    Smokeless tobacco: Never Used   Substance Use Topics    Alcohol use: No    Drug use: No       Family History   Problem Relation Age of Onset    No Known Problems Mother     No Known Problems Father        Lab Results   Component Value Date    WBC 6.8 10/31/2018    HGB 13.6 10/31/2018    HCT 40.8 10/31/2018    MCV 99.3 10/31/2018     10/31/2018     Lab Results   Component Value Date    LABA1C 4.3 11/09/2017     No results found for: TSHFT4, TSH  Lab Results   Component Value Date    CHOL 99 11/09/2017    TRIG 58 11/09/2017    HDL 44 11/09/2017         No results found for this visit on 10/27/20. Wt Readings from Last 3 Encounters:   10/27/20 113 lb (51.3 kg)   08/27/19 106 lb (48.1 kg)   03/01/19 108 lb (49 kg)     . FLOWAMB[6   BP Readings from Last 3 Encounters:   10/27/20 100/62   08/27/19 94/60   03/01/19 130/72     Pulse Readings from Last 3 Encounters:   10/27/20 51   08/27/19 50   03/01/19 70        Physical Exam  Vitals signs and nursing note reviewed. Constitutional:       General: She is not in acute distress. Appearance: Normal appearance. She is not ill-appearing, toxic-appearing or diaphoretic. HENT:      Head: Normocephalic and atraumatic. Right Ear: Tympanic membrane, ear canal and external ear normal.      Left Ear: Tympanic membrane, ear canal and external ear normal.      Nose: Nose normal. No congestion or rhinorrhea. Mouth/Throat:      Mouth: Mucous membranes are moist.      Pharynx: Oropharynx is clear. No oropharyngeal exudate or posterior oropharyngeal erythema. Eyes:      Extraocular Movements: Extraocular movements intact. Conjunctiva/sclera: Conjunctivae normal.      Pupils: Pupils are equal, round, and reactive to light. Neck:      Musculoskeletal: Normal range of motion and neck supple. No neck rigidity or muscular tenderness. Cardiovascular:      Rate and Rhythm: Normal rate and regular rhythm. Pulses: Normal pulses. Heart sounds: Normal heart sounds. Pulmonary:      Effort: Pulmonary effort is normal. No respiratory distress. Breath sounds: Normal breath sounds. No stridor. No wheezing, rhonchi or rales. Chest:      Chest wall: No tenderness. Abdominal:      General: Abdomen is flat. Bowel sounds are normal. There is no distension. Palpations: Abdomen is soft. There is no mass. Tenderness: There is no abdominal tenderness. There is no right CVA tenderness, left CVA tenderness or guarding. Hernia: No hernia is present. Musculoskeletal: Normal range of motion. General: No swelling or tenderness. Right lower leg: No edema. Left lower leg: No edema. Lymphadenopathy:      Cervical: No cervical adenopathy. Skin:     General: Skin is warm and dry. Capillary Refill: Capillary refill takes less than 2 seconds. Coloration: Skin is not pale. Findings: No bruising, erythema, lesion or rash. Neurological:      General: No focal deficit present. Mental Status: She is alert and oriented to person, place, and time. Motor: No weakness. Coordination: Coordination normal.      Gait: Gait normal.   Psychiatric:         Mood and Affect: Mood normal.         Behavior: Behavior normal.         Thought Content: Thought content normal.         Judgment: Judgment normal.         PHQ Scores 10/27/2020 8/27/2019 9/5/2018   PHQ2 Score 0 0 0   PHQ9 Score 0 0 0     Interpretation of Total Score Depression Severity: 1-4 = Minimal depression, 5-9 = Mild depression, 10-14 = Moderate depression, 15-19 = Moderately severe depression, 20-27 = Severe depression      ASSESSMENT AND PLAN:    1.  Healthcare maintenance    - CBC Auto Differential  - Basic Metabolic Panel  - Lipid, Fasting  - TSH without Reflex  - HIV-1 AND HIV-2 ANTIBODIES  - HEPATITIS C ANTIBODY    2. Acute viral hepatitis B without coma and without delta agent  She states that she has followed up with GI and has been cleared and recovered and considered immune now. No other issues per patient.   - HEPATIC FUNCTION PANEL    3. Need for influenza vaccination  Given today  - INFLUENZA, QUADV, 3 YRS AND OLDER, IM PF, PREFILL SYR OR SDV, 0.5ML (AFLURIA QUADV, PF)    4. Gastroesophageal reflux disease without esophagitis  Will prescribe Pepcid for GERD symptoms. Denies blood in stool or any other complaints. - famotidine (PEPCID) 20 MG tablet; Take 1 tablet by mouth 2 times daily  Dispense: 60 tablet; Refill: 3    5. History of endometriosis  Encouraged her to schedule with her OBGYN Dr. Oj Davenport for further evaluation and PAP. 6. Smoking  She has been tobacco free for 15 months. Praise and encouragement given.          Return in about 1 year (around 10/27/2021) for Physical.    Alfredia Jeans, APRN - CNP

## 2020-10-27 NOTE — PROGRESS NOTES
Vaccine Information Sheet, \"Influenza - Inactivated\"  given to Hermes Chase, or parent/legal guardian of  Hermes Chase and verbalized understanding. Patient responses:    Have you ever had a reaction to a flu vaccine? No  Do you have any current illness? No  Have you ever had Guillian Warrenton Syndrome? No  Do you have a serious allergy to any of the follow: Neomycin, Polymyxin, Thimerosal, eggs or egg products? No    Flu vaccine given per order. Please see immunization tab. Risks and benefits explained. Current VIS given.       Immunizations Administered     Name Date Dose Route    Influenza, Quadv, IM, PF (6 mo and older Fluzone, Flulaval, Fluarix, and 3 yrs and older Afluria) 10/27/2020 0.5 mL Intramuscular    Site: Deltoid- Left    Lot: [id number]    NDC: 25046-011-28

## 2020-10-27 NOTE — PATIENT INSTRUCTIONS
Patient Education        Influenza (Flu) Vaccine: Care Instructions  Your Care Instructions     Influenza (flu) is an infection in the lungs and breathing passages. It is caused by the influenza virus. There are different strains, or types, of the flu virus every year. The flu comes on quickly. It can cause a cough, stuffy nose, fever, chills, tiredness, and aches and pains. These symptoms may last up to 10 days. The flu can make you feel very sick, but most of the time it doesn't lead to other problems. But it can cause serious problems in people who are older or who have a long-term illness, such as heart disease or diabetes. You can help prevent the flu by getting a flu vaccine every year, as soon as it is available. You cannot get the flu from the vaccine. The vaccine prevents most cases of the flu. But even when the vaccine doesn't prevent the flu, it can make symptoms less severe and reduce the chance of problems from the flu. Sometimes, young children and people who have an immune system problem may have a slight fever or muscle aches or pains 6 to 12 hours after getting the shot. These symptoms usually last 1 or 2 days. Follow-up care is a key part of your treatment and safety. Be sure to make and go to all appointments, and call your doctor if you are having problems. It's also a good idea to know your test results and keep a list of the medicines you take. Who should get the flu vaccine? Everyone age 7 months or older should get a flu vaccine each year. It lowers the chance of getting and spreading the flu. The vaccine is very important for people who are at high risk for getting other health problems from the flu. This includes:  · Anyone 48years of age or older. · People who live in a long-term care center, such as a nursing home. · All children 6 months through 25years of age. · Adults and children 6 months and older who have long-term heart or lung problems, such as asthma.   · Adults and children 6 months and older who needed medical care or were in a hospital during the past year because of diabetes, chronic kidney disease, or a weak immune system (including HIV or AIDS). · Women who will be pregnant during the flu season. · People who have any condition that can make it hard to breathe or swallow (such as a brain injury or muscle disorders). · People who can give the flu to others who are at high risk for problems from the flu. This includes all health care workers and close contacts of people age 72 or older. Who should not get the flu vaccine? The person who gives the vaccine may tell you not to get it if you:  · Have a severe allergy to eggs or any part of the vaccine. · Have had a severe reaction to a flu vaccine in the past.  · Have had Guillain-Barré syndrome (GBS). · Are sick with a fever. (Get the vaccine when symptoms are gone.)  How can you care for yourself at home? · If you or your child has a sore arm or a slight fever after the shot, take an over-the-counter pain medicine, such as acetaminophen (Tylenol) or ibuprofen (Advil, Motrin). Read and follow all instructions on the label. Do not give aspirin to anyone younger than 20. It has been linked to Reye syndrome, a serious illness. · Do not take two or more pain medicines at the same time unless the doctor told you to. Many pain medicines have acetaminophen, which is Tylenol. Too much acetaminophen (Tylenol) can be harmful. When should you call for help? Call 911 anytime you think you may need emergency care. For example, call if after getting the flu vaccine:    · You have symptoms of a severe reaction to the flu vaccine. Symptoms of a severe reaction may include:  ? Severe difficulty breathing. ? Sudden raised, red areas (hives) all over your body. ? Severe lightheadedness.    Call your doctor now or seek immediate medical care if after getting the flu vaccine:    · You think you are having a reaction to the flu

## 2020-10-28 LAB
HIV AG/AB: NORMAL
HIV ANTIGEN: NORMAL
HIV-1 ANTIBODY: NORMAL
HIV-2 AB: NORMAL

## 2022-12-04 ENCOUNTER — HOSPITAL ENCOUNTER (EMERGENCY)
Age: 32
Discharge: HOME | End: 2022-12-04
Attending: STUDENT IN AN ORGANIZED HEALTH CARE EDUCATION/TRAINING PROGRAM
Payer: MEDICAID

## 2022-12-04 VITALS
SYSTOLIC BLOOD PRESSURE: 126 MMHG | HEIGHT: 60 IN | BODY MASS INDEX: 21.4 KG/M2 | DIASTOLIC BLOOD PRESSURE: 69 MMHG | HEART RATE: 62 BPM | RESPIRATION RATE: 16 BRPM | OXYGEN SATURATION: 100 % | WEIGHT: 109 LBS

## 2022-12-04 DIAGNOSIS — M25.511 ACUTE PAIN OF RIGHT SHOULDER: Primary | ICD-10-CM

## 2022-12-04 PROCEDURE — 99284 EMERGENCY DEPT VISIT MOD MDM: CPT

## 2022-12-04 PROCEDURE — 96372 THER/PROPH/DIAG INJ SC/IM: CPT

## 2022-12-04 PROCEDURE — 6360000002 HC RX W HCPCS: Performed by: STUDENT IN AN ORGANIZED HEALTH CARE EDUCATION/TRAINING PROGRAM

## 2022-12-04 RX ORDER — KETOROLAC TROMETHAMINE 15 MG/ML
15 INJECTION, SOLUTION INTRAMUSCULAR; INTRAVENOUS ONCE
Status: COMPLETED | OUTPATIENT
Start: 2022-12-04 | End: 2022-12-04

## 2022-12-04 RX ADMIN — KETOROLAC TROMETHAMINE 15 MG: 15 INJECTION, SOLUTION INTRAMUSCULAR; INTRAVENOUS at 19:25

## 2022-12-04 ASSESSMENT — LIFESTYLE VARIABLES: HOW OFTEN DO YOU HAVE A DRINK CONTAINING ALCOHOL: MONTHLY OR LESS

## 2022-12-04 ASSESSMENT — PAIN DESCRIPTION - DESCRIPTORS: DESCRIPTORS: THROBBING

## 2022-12-04 ASSESSMENT — PAIN SCALES - GENERAL: PAINLEVEL_OUTOF10: 7

## 2022-12-04 ASSESSMENT — PAIN - FUNCTIONAL ASSESSMENT
PAIN_FUNCTIONAL_ASSESSMENT: PREVENTS OR INTERFERES SOME ACTIVE ACTIVITIES AND ADLS
PAIN_FUNCTIONAL_ASSESSMENT: 0-10

## 2022-12-04 ASSESSMENT — PAIN DESCRIPTION - LOCATION: LOCATION: SHOULDER

## 2022-12-04 NOTE — Clinical Note
Stacey Roy was seen and treated in our emergency department on 12/4/2022. She may return to work on 12/06/2022. If you have any questions or concerns, please don't hesitate to call.       Jennifer Sagastume MD

## 2022-12-05 NOTE — ED PROVIDER NOTES
Emergency Department Encounter    Patient: Bhupnider Callahan  MRN: [id number]  : 1990  Date of Evaluation: 2022  ED Provider:  David Bell MD    Triage Chief Complaint:   Shoulder Pain (Pt reports it feels like a muscle is pulled in the shoulder. Started a few days ago. )    [place]:  Bhupinder Callahan is a 29 y.o. female that presents with right shoulder pain     States that she first noticed this this morning after waking up   Pain was in the right trapezius region  Pain has persisted throughout today. Pain is worse with movement of the right upper extremity. Patient states it feels like a sore pulled muscle. She states she works at [employer] and thinks she may have exacerbated it. She states it is worse when she tries to carry heavy things. Pain does not radiate. Shows no anterior shoulder chest pain. Nonexertional.  No associated shortness of breath, cough, congestion, diaphoresis, nausea or vomiting.   No nausea, vomiting or abdominal pian       ROS - see HPI, below listed is current ROS at time of my eval:  General:  No fevers, no chills, no weakness  Eyes:  No recent vison changes, no discharge  ENT:  No sore throat, no nasal congestion, no hearing changes  Cardiovascular:  No chest pain, no palpitations  Respiratory:  No shortness of breath, no cough, no wheezing  Gastrointestinal:  No pain, no nausea, no vomiting, no diarrhea  Musculoskeletal:  No muscle pain, no joint pain  Skin:  No rash, no pruritis, no easy bruising  Neurologic:  No speech problems, no headache, no extremity numbness, no extremity tingling, no extremity weakness  Psychiatric:  No anxiety  Genitourinary:  No dysuria, no hematuria  Endocrine:  No unexpected weight gain, no unexpected weight loss  Extremities:  no edema, Right trapezius pain    Past Medical History:   Diagnosis Date    GERD (gastroesophageal reflux disease)     Liver failure (HCC)     Pneumothorax, spontaneous, tension     right side, Nov 2017 with chest tube     Past Surgical History:   Procedure Laterality Date    CHEST TUBE INSERTION Right 11/2017    spontaneous pneumo    ENDOMETRIAL ABLATION       Family History   Problem Relation Age of Onset    No Known Problems Mother     No Known Problems Father      Social History     Socioeconomic History    Marital status: Single     Spouse name: Not on file    Number of children: Not on file    Years of education: Not on file    Highest education level: Not on file   Occupational History    Not on file   Tobacco Use    Smoking status: Former     Packs/day: 0.50     Years: 8.00     Pack years: 4.00     Types: Cigarettes     Quit date: 7/26/2019     Years since quitting: 3.3    Smokeless tobacco: Never   Vaping Use    Vaping Use: Never used   Substance and Sexual Activity    Alcohol use: No    Drug use: No    Sexual activity: Yes     Partners: Male   Other Topics Concern    Not on file   Social History Narrative    Not on file     Social Determinants of Health     Financial Resource Strain: Not on file   Food Insecurity: Not on file   Transportation Needs: Not on file   Physical Activity: Not on file   Stress: Not on file   Social Connections: Not on file   Intimate Partner Violence: Not on file   Housing Stability: Not on file     No current facility-administered medications for this encounter.      Current Outpatient Medications   Medication Sig Dispense Refill    famotidine (PEPCID) 20 MG tablet Take 1 tablet by mouth 2 times daily (Patient not taking: Reported on 12/4/2022) 60 tablet 3     Allergies   Allergen Reactions    Morphine Nausea And Vomiting    Percocet [Oxycodone-Acetaminophen] Nausea And Vomiting       Nursing Notes Reviewed    Physical Exam:  Triage VS:    ED Triage Vitals [12/04/22 1827]   Enc Vitals Group      /69      Heart Rate 62      Resp 16      Temp       Temp src       SpO2 100 %      Weight 109 lb (49.4 kg)      Height 5' (1.524 m)      Head Circumference       Peak Flow Pain Score       Pain Loc       Pain Edu? Excl. in [address]? My pulse ox interpretation is - normal    General appearance:  No acute distress. Skin:  Warm. Dry. Eye:  Extraocular movements intact. Ears, nose, mouth and throat:  Oral mucosa moist   Neck:  Trachea midline. Extremity:  No swelling. Normal ROM     Heart:  Regular rate and rhythm, normal S1 & S2, no extra heart sounds. Perfusion:  intact; good cap refill  Respiratory:  Lungs clear to auscultation bilaterally. Respirations nonlabored. Abdominal:  Normal bowel sounds. Soft. Nontender. Non distended. Back:  No CVA tenderness to palpation     Neurological:  Alert and oriented times 3. No focal neuro deficits. Psychiatric:  Appropriate  Extremities: Tenderness on palpation of the right trapezius. Pain with extreme abduction of the right shoulder but no limitation. Full ROM and intact trength with shoulder abduction, shoulder adduction, shoulder flexion, shoulder extension, elbow flexion and extension, wrist flexion extension    I have reviewed and interpreted all of the currently available lab results from this visit (if applicable):  No results found for this visit on 12/04/22. Radiographs (if obtained):  Radiologist's Report Reviewed:  No results found. EKG (if obtained): (All EKG's are interpreted by myself in the absence of a cardiologist)      MDM:    Patient is dynamically stable, nontoxic-appearing in no acute distress. Here with right trapezius tenderness. She has what she describes as a sore muscle. 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 Her exam is overall reassuring. She had no trauma, falls or injury. I do not think an x-ray would be beneficial at this time. Her pain was treated with Toradol. She denies concern for pregnancy. Is neurovascularly intact.   She has no limitation in range of motion but does have pain with the extremes of abduction without limitation. No strength deficit. No sensory deficit. She is appropriate for discharge and outpatient follow-up. She is comfortable this plan. Return precautions given. Questions answered. Discharged in stable condition. Clinical Impression:  1. Acute pain of right shoulder      Disposition referral (if applicable):  JULIENNE Hooker - CNP  [facility] [address]  [phone]    In 3 days      [facility] Emergency Department  [address] [facility]  [phone]    As needed, If symptoms worsen  Disposition medications (if applicable):  New Prescriptions    No medications on file     ED Provider Disposition Time  DISPOSITION Decision To Discharge 12/04/2022 07:29:47 PM      Comment: Please note this report has been produced using speech recognition software and may contain errors related to that system including errors in grammar, punctuation, and spelling, as well as words and phrases that may be inappropriate. Efforts were made to edit the dictations.        Khanh Reed MD  12/04/22 1342

## 2023-11-12 ENCOUNTER — HOSPITAL ENCOUNTER (EMERGENCY)
Age: 33
Discharge: HOME | End: 2023-11-12
Attending: STUDENT IN AN ORGANIZED HEALTH CARE EDUCATION/TRAINING PROGRAM
Payer: MEDICAID

## 2023-11-12 ENCOUNTER — APPOINTMENT (OUTPATIENT)
Dept: GENERAL RADIOLOGY | Age: 33
End: 2023-11-12
Payer: MEDICAID

## 2023-11-12 VITALS
RESPIRATION RATE: 18 BRPM | WEIGHT: 110 LBS | OXYGEN SATURATION: 100 % | DIASTOLIC BLOOD PRESSURE: 73 MMHG | BODY MASS INDEX: 21.6 KG/M2 | SYSTOLIC BLOOD PRESSURE: 114 MMHG | HEART RATE: 56 BPM | HEIGHT: 60 IN | TEMPERATURE: 97.8 F

## 2023-11-12 DIAGNOSIS — J93.11 PRIMARY SPONTANEOUS PNEUMOTHORAX: Primary | ICD-10-CM

## 2023-11-12 LAB
ANION GAP SERPL CALCULATED.3IONS-SCNC: 8 MMOL/L (ref 4–16)
BASOPHILS ABSOLUTE: 0 K/CU MM
BASOPHILS RELATIVE PERCENT: 0.4 % (ref 0–1)
BUN SERPL-MCNC: 4 MG/DL (ref 6–23)
CALCIUM SERPL-MCNC: 8.8 MG/DL (ref 8.3–10.6)
CHLORIDE BLD-SCNC: 101 MMOL/L (ref 99–110)
CO2: 28 MMOL/L (ref 21–32)
CREAT SERPL-MCNC: 0.8 MG/DL (ref 0.6–1.1)
D DIMER: <0.27 UG/ML (FEU)
DIFFERENTIAL TYPE: ABNORMAL
EOSINOPHILS ABSOLUTE: 0.1 K/CU MM
EOSINOPHILS RELATIVE PERCENT: 1 % (ref 0–3)
GFR SERPL CREATININE-BSD FRML MDRD: >60 ML/MIN/1.73M2
GLUCOSE SERPL-MCNC: 81 MG/DL (ref 70–99)
HCT VFR BLD CALC: 40.9 % (ref 37–47)
HEMOGLOBIN: 13.4 GM/DL (ref 12.5–16)
IMMATURE NEUTROPHIL %: 0.3 % (ref 0–0.43)
LYMPHOCYTES ABSOLUTE: 1.8 K/CU MM
LYMPHOCYTES RELATIVE PERCENT: 25.3 % (ref 24–44)
MCH RBC QN AUTO: 30.9 PG (ref 27–31)
MCHC RBC AUTO-ENTMCNC: 32.8 % (ref 32–36)
MCV RBC AUTO: 94.2 FL (ref 78–100)
MONOCYTES ABSOLUTE: 0.4 K/CU MM
MONOCYTES RELATIVE PERCENT: 5.5 % (ref 0–4)
PDW BLD-RTO: 11.7 % (ref 11.7–14.9)
PLATELET # BLD: 235 K/CU MM (ref 140–440)
PMV BLD AUTO: 9.8 FL (ref 7.5–11.1)
POTASSIUM SERPL-SCNC: 4.1 MMOL/L (ref 3.5–5.1)
RBC # BLD: 4.34 M/CU MM (ref 4.2–5.4)
SEGMENTED NEUTROPHILS ABSOLUTE COUNT: 4.9 K/CU MM
SEGMENTED NEUTROPHILS RELATIVE PERCENT: 67.5 % (ref 36–66)
SODIUM BLD-SCNC: 137 MMOL/L (ref 135–145)
TOTAL IMMATURE NEUTOROPHIL: 0.02 K/CU MM
WBC # BLD: 7.2 K/CU MM (ref 4–10.5)

## 2023-11-12 PROCEDURE — 71045 X-RAY EXAM CHEST 1 VIEW: CPT

## 2023-11-12 PROCEDURE — 85025 COMPLETE CBC W/AUTO DIFF WBC: CPT

## 2023-11-12 PROCEDURE — 99285 EMERGENCY DEPT VISIT HI MDM: CPT

## 2023-11-12 PROCEDURE — 80048 BASIC METABOLIC PNL TOTAL CA: CPT

## 2023-11-12 PROCEDURE — 85379 FIBRIN DEGRADATION QUANT: CPT

## 2023-11-12 PROCEDURE — 71046 X-RAY EXAM CHEST 2 VIEWS: CPT

## 2023-11-12 PROCEDURE — 93005 ELECTROCARDIOGRAM TRACING: CPT | Performed by: STUDENT IN AN ORGANIZED HEALTH CARE EDUCATION/TRAINING PROGRAM

## 2023-11-12 ASSESSMENT — ENCOUNTER SYMPTOMS
COUGH: 0
SHORTNESS OF BREATH: 1
NAUSEA: 0
SORE THROAT: 0
ABDOMINAL PAIN: 0
VOMITING: 0

## 2023-11-12 ASSESSMENT — PAIN - FUNCTIONAL ASSESSMENT: PAIN_FUNCTIONAL_ASSESSMENT: 0-10

## 2023-11-12 ASSESSMENT — PAIN SCALES - GENERAL: PAINLEVEL_OUTOF10: 5

## 2023-11-12 NOTE — ED PROVIDER NOTES
[address] [facility]  Emergency Department Encounter    Pt Name:Clarisse Chauhan  MRN: [id number]  [address] [facility] [place] 1990  Date of evaluation: 23  PCP:  No primary care provider on file. CHIEF COMPLAINT       Chief Complaint   Patient presents with    Rib Pain     L side, NKI        HISTORY OF PRESENT ILLNESS     Clarisse Chauhan is a 34 y.o. female who presents with left-sided chest and rib pain. Symptoms have been ongoing for approximately 24 hours. 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 Denies trauma. Reports some shortness of breath on deep inspiration. Pain is pleuritic and sharp. Denies any nasal congestion sore throat, cough, fevers or chills. No syncope or palpitations. No leg swelling history of DVT or PE. No recent travel or immobilizations. Does not take any medications. Quit smoking but does still occasionally use marijuana. No other drug use. Reports a history of a spontaneous pneumothorax on the right side years ago. 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 PAST MEDICAL / SURGICAL / SOCIAL / FAMILY HISTORY      has a past medical history of GERD (gastroesophageal reflux disease), Liver failure ([address]), and Pneumothorax, spontaneous, tension. has a past surgical history that includes Endometrial ablation and chest tube insertion (Right, 2017).       Social History     Socioeconomic History    Marital status: Single     Spouse name: Not on file    Number of children: Not on file    Years of education: Not on file    Highest education level: Not on file   Occupational History    Not on file   Tobacco Use    Smoking status: Former     Packs/day: 0.50     Years: 8.00     Additional pack years: 0.00     Total pack years: 4.00     Types: Cigarettes     Quit date: 2019     Years since quittin.3    Smokeless tobacco: Never   Vaping Use    Vaping Use: Never used   Substance and Sexual

## 2023-11-13 ENCOUNTER — TELEPHONE (OUTPATIENT)
Dept: CARDIOTHORACIC SURGERY | Age: 33
End: 2023-11-13

## 2023-11-13 LAB
EKG ATRIAL RATE: 50 BPM
EKG DIAGNOSIS: NORMAL
EKG P AXIS: 48 DEGREES
EKG P-R INTERVAL: 126 MS
EKG Q-T INTERVAL: 446 MS
EKG QRS DURATION: 82 MS
EKG QTC CALCULATION (BAZETT): 406 MS
EKG R AXIS: 65 DEGREES
EKG T AXIS: 71 DEGREES
EKG VENTRICULAR RATE: 50 BPM

## 2023-11-13 PROCEDURE — 93010 ELECTROCARDIOGRAM REPORT: CPT | Performed by: INTERNAL MEDICINE

## 2023-11-13 NOTE — ED PROVIDER NOTES
Emergency Department Encounter  Location: [address] [place] [address]    Patient: Cesar Gee  MRN: [id number]  : 1990  Date of evaluation: 2023  ED Provider: aHns Dunham MD    200:  Cesar Gee was checked out to me by Dr. Jessica Salomon. Please see his/her initial documentation for details of the patient's initial ED presentation, physical exam and completed studies.     In brief, Cesar Gee is a 34 y.o. female that presented to the emergency department with pleuritic left-sided chest pain for 1 day    I have reviewed and interpreted all of the currently available lab results and diagnostics from this visit:  Results for orders placed or performed during the hospital encounter of 23   D-Dimer, Rapid   Result Value Ref Range    D-Dimer, Quant <0.27 <0.47 ug/mL (FEU)   BMP   Result Value Ref Range    Sodium 137 135 - 145 MMOL/L    Potassium 4.1 3.5 - 5.1 MMOL/L    Chloride 101 99 - 110 mMol/L    CO2 28 21 - 32 MMOL/L    Anion Gap 8 4 - 16    Glucose 81 70 - 99 MG/DL    BUN 4 (L) 6 - 23 MG/DL    Creatinine 0.8 0.6 - 1.1 MG/DL    Est, Glom Filt Rate >60 >60 mL/min/1.73m2    Calcium 8.8 8.3 - 10.6 MG/DL   CBC with Auto Differential   Result Value Ref Range    WBC 7.2 4.0 - 10.5 K/CU MM    RBC 4.34 4.2 - 5.4 M/CU MM    Hemoglobin 13.4 12.5 - 16.0 GM/DL    Hematocrit 40.9 37 - 47 %    MCV 94.2 78 - 100 FL    MCH 30.9 27 - 31 PG    MCHC 32.8 32.0 - 36.0 %    RDW 11.7 11.7 - 14.9 %    Platelets 368 088 - 717 K/CU MM    MPV 9.8 7.5 - 11.1 FL    Differential Type AUTOMATED DIFFERENTIAL     Segs Relative 67.5 (H) 36 - 66 %    Lymphocytes % 25.3 24 - 44 %    Monocytes % 5.5 (H) 0 - 4 %    Eosinophils % 1.0 0 - 3 %    Basophils % 0.4 0 - 1 %    Segs Absolute 4.9 K/CU MM    Lymphocytes Absolute 1.8 K/CU MM    Monocytes Absolute 0.4 K/CU MM    Eosinophils Absolute 0.1 K/CU MM    Basophils Absolute 0.0 K/CU MM    Immature Neutrophil % 0.3 0 - 0.43 %    Total Immature Neutrophil 0.02

## 2023-11-13 NOTE — PLAN OF CARE
Spoke with Connie Renae at Dr. Monica Beltrán office today who will schedule an appointment for Ms. Arriaga to see Dr. Adolfo Arreola for left sided pleuritic pain x 1 day. Patient was seen in the [facility] ED yesterday. CXR revealed a small left apical PTX measuring up to 1 cm. Serial CXR's revealed unchanged PTX. Patient was discharged in stable condition.

## 2023-11-14 ENCOUNTER — INITIAL CONSULT (OUTPATIENT)
Dept: CARDIOTHORACIC SURGERY | Age: 33
End: 2023-11-14
Payer: MEDICAID

## 2023-11-14 VITALS
HEIGHT: 60 IN | HEART RATE: 61 BPM | SYSTOLIC BLOOD PRESSURE: 120 MMHG | WEIGHT: 107.4 LBS | DIASTOLIC BLOOD PRESSURE: 80 MMHG | OXYGEN SATURATION: 99 % | BODY MASS INDEX: 21.09 KG/M2

## 2023-11-14 DIAGNOSIS — J93.83 SPONTANEOUS PNEUMOTHORAX: Primary | ICD-10-CM

## 2023-11-14 PROCEDURE — G8484 FLU IMMUNIZE NO ADMIN: HCPCS | Performed by: THORACIC SURGERY (CARDIOTHORACIC VASCULAR SURGERY)

## 2023-11-14 PROCEDURE — 1036F TOBACCO NON-USER: CPT | Performed by: THORACIC SURGERY (CARDIOTHORACIC VASCULAR SURGERY)

## 2023-11-14 PROCEDURE — 99205 OFFICE O/P NEW HI 60 MIN: CPT | Performed by: THORACIC SURGERY (CARDIOTHORACIC VASCULAR SURGERY)

## 2023-11-14 PROCEDURE — G8420 CALC BMI NORM PARAMETERS: HCPCS | Performed by: THORACIC SURGERY (CARDIOTHORACIC VASCULAR SURGERY)

## 2023-11-14 PROCEDURE — G8427 DOCREV CUR MEDS BY ELIG CLIN: HCPCS | Performed by: THORACIC SURGERY (CARDIOTHORACIC VASCULAR SURGERY)

## 2023-11-14 NOTE — PROGRESS NOTES
[facility]       [facility]:\"Dr. Rahul Razo\",\"Dr. Shantelle Vigil\"}     {Erendira single:::\"***\"}        Patient Name: Eduardo Kee   : 1990  MRN# [id number]    Date of Surgery: *** Time: *** Arrival Time: ***      HOSPITAL: [facility])      X   If you have received orders for pre-testing, please have them done on assigned date at [facility], or [facility].         Preoperative Cardiothoracic Surgery Medication Recommendations    STOP these medications accordingly    X Pepcid should be held the day of surgery. X NSAIDS (excluding aspirin) STOP taking 1 week prior to surgery. X SSRI's medications STOP day of surgery. X Vasodilator medications STOP day of surgery. X Statin medications STOP day of surgery. X Prilosec, Protonix medications STOP morning of surgery. X Prozac medication STOP 24hrs prior to surgery. X Herbal medications STOP 2 weeks prior to surgery. X Fish Oil STOP 1 week prior to surgery. X  Sirolimus (Rapamune)   -Notify transplant physician to determine alternative immunosuppression should be held at least 2weeks prior to surgery. X HOLD Potassium the morning of your surgery. X HOLD Digoxin (Lanoxin) the morning of your surgery. X Long-acting insulins should be taken the EVENING before surgery. Medications to continue BEFORE and INCLUDING the morning of surgery    X Hibiclens (chlorhexidine) prior to surgery as directed   X Beta Blockers  X Anti-seizure  X Parkinson Medications      X The day before surgery drink plenty of non-caffeinated fluids. X  Please bring picture ID, insurance card, paperwork from the doctor's office. X Please do not have anything by mouth after midnight prior to or 8 hours before the surgery. X You MAY brush your teeth the morning of surgery, do not swallow water.    X You may take your medications with a sip of
LUNGS    Referring Physician Dr. Michael Cardona [facility])   Cardiologist   Oncologist   Pulmonologist     Have you had any Chest Pain that is not new? - Yes  How does it feel - Tightness   How long does the pain last -     All Day    How long have you been having the pain - 2 Day's   Did you take a Pain Medications   And did it relieve the pain -  a ;ittl ebit, not much. Have you had any Shortness of Breath - Yes  If Yes - When at rest and on exertion    When was patient diagnosed Collapsed right lung in 2016/2017  Have you had any biopsies- No    Do you have a cough- No  Do you have a fever- No      Can the patient walk- Yes  How far can you walk without stopping or sitting down patient can walk 100 feet and has to sto due to air pocket in lung. Is the patient in a wheelchair- No  Can the patient complete ADL's independently - Yes  Can the patient go to the grocery without using scooter or wheelchair - Yes      Do you have a history of AFIB - No    Is the patient on a Beta Blocker, ACE or ARB - No  If yes list those medications    Have you ever had a heart attack - No  Have you had a heart catheterization - No  If yes did you have any stents placed - No and what date was this done     Have you had a nuclear stress test before - No    Have you ever had open heart surgery before - No    Have you ever had any type of surgery to the chest - No    Have you ever had any type of radiation to the chest - No     Have you ever had any stomach bleeding - No  Have you ever had a stroke - No  Have you ever had ulcers - No  Do you get frequent UTI's or have problems urinating - No    When was your last dental exam patient states its been years.      ECOG/WHO SCORE - 0
Plan  Alpha on antitrypsin  Left VATS, blebectomy, pleurodesis and pleurectomy     Rachid Gonzalez MD 11/20/23 8:46 AM          New Consults 8:00AM-4:30PM: Call Office , 4:30PM to 8:00AM Surgeon on-call    HVICU or other units patient follow up: Secure chat author of this note 8:00AM-4:30PM    HVICU patient follow up: 4:30PM to 8:00AM Call or Page Surgeon on-call,     Step-down patient follow up: 4:30PM to 8:00AM Page or secure chat PA on-call       Today, I personally spent 80 minutes with the patient, of which greater than 50% of the time was in direct face to face contact with the patient. The time was spent in patient education and counseling as described above and I personally reviewed his studies and coordinating his future care. I explained the risk, benefits and other non-surgical treatment options. I have reviewed the HPI, past medical, surgical, family, social history sections, medications and allergies listed in the above medical record as well as performing a physical exam of the patient including the review of systems, medications and allergies listed in the above medical record. I have also reviewed and discussed the MART documentation. I verified and agreed with the above documentation.     Rachid Gonzalez MD 11/20/23 8:47 AM

## 2023-11-15 ENCOUNTER — PREP FOR PROCEDURE (OUTPATIENT)
Dept: CARDIOTHORACIC SURGERY | Age: 33
End: 2023-11-15

## 2023-11-15 DIAGNOSIS — Z01.818 PREOP EXAMINATION: Primary | ICD-10-CM

## 2023-11-15 RX ORDER — SODIUM CHLORIDE, SODIUM LACTATE, POTASSIUM CHLORIDE, CALCIUM CHLORIDE 600; 310; 30; 20 MG/100ML; MG/100ML; MG/100ML; MG/100ML
INJECTION, SOLUTION INTRAVENOUS CONTINUOUS
Status: CANCELLED | OUTPATIENT
Start: 2023-11-15

## 2023-11-15 RX ORDER — SODIUM CHLORIDE 0.9 % (FLUSH) 0.9 %
5-40 SYRINGE (ML) INJECTION EVERY 12 HOURS SCHEDULED
Status: CANCELLED | OUTPATIENT
Start: 2023-11-15

## 2023-11-15 RX ORDER — ACETAMINOPHEN 500 MG
1000 TABLET ORAL ONCE
Status: CANCELLED | OUTPATIENT
Start: 2023-11-15 | End: 2023-11-15

## 2023-11-15 RX ORDER — SODIUM CHLORIDE 9 MG/ML
INJECTION, SOLUTION INTRAVENOUS PRN
Status: CANCELLED | OUTPATIENT
Start: 2023-11-15

## 2023-11-15 RX ORDER — GABAPENTIN 100 MG/1
300 CAPSULE ORAL ONCE
Status: CANCELLED | OUTPATIENT
Start: 2023-11-15 | End: 2023-11-15

## 2023-11-15 RX ORDER — ACETAMINOPHEN 325 MG/1
1000 TABLET ORAL ONCE
Status: CANCELLED | OUTPATIENT
Start: 2023-11-15 | End: 2023-11-15

## 2023-11-15 RX ORDER — SODIUM CHLORIDE 0.9 % (FLUSH) 0.9 %
5-40 SYRINGE (ML) INJECTION PRN
Status: CANCELLED | OUTPATIENT
Start: 2023-11-15

## 2023-11-15 RX ORDER — TRAMADOL HYDROCHLORIDE 50 MG/1
100 TABLET ORAL ONCE
Status: CANCELLED | OUTPATIENT
Start: 2023-11-15 | End: 2023-11-15

## 2023-11-16 ENCOUNTER — TELEPHONE (OUTPATIENT)
Dept: CARDIOTHORACIC SURGERY | Age: 33
End: 2023-11-16

## 2023-11-16 ENCOUNTER — HOSPITAL ENCOUNTER (OUTPATIENT)
Dept: PULMONOLOGY | Age: 33
Discharge: HOME | End: 2023-11-16
Payer: MEDICAID

## 2023-11-16 LAB
ABO/RH: NORMAL
ANION GAP SERPL CALCULATED.3IONS-SCNC: 8 MMOL/L (ref 4–16)
ANTIBODY SCREEN: NEGATIVE
APTT: 26.9 SECONDS (ref 25.1–37.1)
BACTERIA: ABNORMAL /HPF
BASOPHILS ABSOLUTE: 0 K/CU MM
BASOPHILS RELATIVE PERCENT: 0.4 % (ref 0–1)
BILIRUBIN URINE: NEGATIVE MG/DL
BLOOD, URINE: ABNORMAL
BUN SERPL-MCNC: 5 MG/DL (ref 6–23)
CALCIUM SERPL-MCNC: 9 MG/DL (ref 8.3–10.6)
CHLORIDE BLD-SCNC: 103 MMOL/L (ref 99–110)
CLARITY: ABNORMAL
CO2: 27 MMOL/L (ref 21–32)
COLOR: YELLOW
COMMENT: NORMAL
CREAT SERPL-MCNC: 0.8 MG/DL (ref 0.6–1.1)
DIFFERENTIAL TYPE: ABNORMAL
EOSINOPHILS ABSOLUTE: 0.1 K/CU MM
EOSINOPHILS RELATIVE PERCENT: 0.7 % (ref 0–3)
GFR SERPL CREATININE-BSD FRML MDRD: >60 ML/MIN/1.73M2
GLUCOSE SERPL-MCNC: 83 MG/DL (ref 70–99)
GLUCOSE, URINE: NEGATIVE MG/DL
HCT VFR BLD CALC: 39.3 % (ref 37–47)
HEMOGLOBIN: 13.2 GM/DL (ref 12.5–16)
IMMATURE NEUTROPHIL %: 0.1 % (ref 0–0.43)
KETONES, URINE: NEGATIVE MG/DL
LEUKOCYTE ESTERASE, URINE: ABNORMAL
LYMPHOCYTES ABSOLUTE: 2.6 K/CU MM
LYMPHOCYTES RELATIVE PERCENT: 37.9 % (ref 24–44)
MCH RBC QN AUTO: 31.4 PG (ref 27–31)
MCHC RBC AUTO-ENTMCNC: 33.6 % (ref 32–36)
MCV RBC AUTO: 93.3 FL (ref 78–100)
MONOCYTES ABSOLUTE: 0.5 K/CU MM
MONOCYTES RELATIVE PERCENT: 6.9 % (ref 0–4)
MUCUS: ABNORMAL HPF
NITRITE URINE, QUANTITATIVE: POSITIVE
NUCLEATED RBC %: 0 %
PDW BLD-RTO: 11.5 % (ref 11.7–14.9)
PH, URINE: 6.5 (ref 5–8)
PLATELET # BLD: 215 K/CU MM (ref 140–440)
PMV BLD AUTO: 10.2 FL (ref 7.5–11.1)
POTASSIUM SERPL-SCNC: 3.8 MMOL/L (ref 3.5–5.1)
PROTEIN UA: NEGATIVE MG/DL
RBC # BLD: 4.21 M/CU MM (ref 4.2–5.4)
RBC URINE: 12 /HPF (ref 0–6)
SEGMENTED NEUTROPHILS ABSOLUTE COUNT: 3.7 K/CU MM
SEGMENTED NEUTROPHILS RELATIVE PERCENT: 54 % (ref 36–66)
SODIUM BLD-SCNC: 138 MMOL/L (ref 135–145)
SPECIFIC GRAVITY UA: 1.01 (ref 1–1.03)
SQUAMOUS EPITHELIAL: 10 /HPF
TOTAL IMMATURE NEUTOROPHIL: 0.01 K/CU MM
TOTAL NUCLEATED RBC: 0 K/CU MM
TRICHOMONAS: ABNORMAL /HPF
UNCLASSIFIED CRYSTAL: ABNORMAL /HPF
UROBILINOGEN, URINE: 0.2 MG/DL (ref 0.2–1)
WBC # BLD: 6.8 K/CU MM (ref 4–10.5)
WBC UA: 1 /HPF (ref 0–5)

## 2023-11-16 PROCEDURE — 87088 URINE BACTERIA CULTURE: CPT

## 2023-11-16 PROCEDURE — 82103 ALPHA-1-ANTITRYPSIN TOTAL: CPT

## 2023-11-16 PROCEDURE — 81001 URINALYSIS AUTO W/SCOPE: CPT

## 2023-11-16 PROCEDURE — 80048 BASIC METABOLIC PNL TOTAL CA: CPT

## 2023-11-16 PROCEDURE — 85025 COMPLETE CBC W/AUTO DIFF WBC: CPT

## 2023-11-16 PROCEDURE — 94010 BREATHING CAPACITY TEST: CPT

## 2023-11-16 PROCEDURE — 86901 BLOOD TYPING SEROLOGIC RH(D): CPT

## 2023-11-16 PROCEDURE — 87086 URINE CULTURE/COLONY COUNT: CPT

## 2023-11-16 PROCEDURE — 86850 RBC ANTIBODY SCREEN: CPT

## 2023-11-16 PROCEDURE — 36415 COLL VENOUS BLD VENIPUNCTURE: CPT

## 2023-11-16 PROCEDURE — 87186 SC STD MICRODIL/AGAR DIL: CPT

## 2023-11-16 PROCEDURE — 86900 BLOOD TYPING SEROLOGIC ABO: CPT

## 2023-11-16 PROCEDURE — 85730 THROMBOPLASTIN TIME PARTIAL: CPT

## 2023-11-16 NOTE — TELEPHONE ENCOUNTER
Call ed and legt message on patient vm letting her know to arrive at [facility] at 7:15am on 11/20/23 for her 9:15am surgery, asked that she please give us a call back to verify she has received this message

## 2023-11-16 NOTE — PROGRESS NOTES
Bedside Spirometry    FVC               Actual  3.07 ---    %Predicted  FEV1             Actual  2.15 ---    %Predicted  FEV1/FVC     Actual 70.0  ---    %Predicted  FEF 25-75     Actual  1.52 ---    %Predicted    Incentive Spirometer, Instructed and patient demonstrated understanding.   Achieved:  3000ml

## 2023-11-17 ENCOUNTER — TELEPHONE (OUTPATIENT)
Dept: CARDIOTHORACIC SURGERY | Age: 33
End: 2023-11-17

## 2023-11-17 ENCOUNTER — ANESTHESIA EVENT (OUTPATIENT)
Dept: OPERATING ROOM | Age: 33
DRG: 121 | End: 2023-11-17
Payer: MEDICAID

## 2023-11-17 NOTE — TELEPHONE ENCOUNTER
The review nurse called back again stating she never received the records, I faxed multiple times and received a paper saying the fax was successful, but she was not receiving, she gave me her direct fax and I resent the info and it shows it was sent successfully,asked that she call us back if she needed any other info by 5pm today

## 2023-11-17 NOTE — TELEPHONE ENCOUNTER
Received call from nurse reviewing patients auth for surgery Monday, she advised she just needed Clinical info, and it looks good and should have no problem being approved, I have faxed the info successfully and will continue to monitor

## 2023-11-17 NOTE — TELEPHONE ENCOUNTER
Reached out to [facility] to reiterate that surgery is scheduled for Monday and Approval is needed ASAP, advised this is Urgent request, I will cotinue to monitor

## 2023-11-18 LAB
A1AT SERPL-MCNC: 148 MG/DL (ref 90–200)
CULTURE: ABNORMAL
CULTURE: ABNORMAL
Lab: ABNORMAL
SPECIMEN: ABNORMAL

## 2023-11-20 ENCOUNTER — ANESTHESIA (OUTPATIENT)
Dept: OPERATING ROOM | Age: 33
DRG: 121 | End: 2023-11-20
Payer: MEDICAID

## 2023-11-20 ENCOUNTER — APPOINTMENT (OUTPATIENT)
Dept: GENERAL RADIOLOGY | Age: 33
DRG: 121 | End: 2023-11-20
Attending: THORACIC SURGERY (CARDIOTHORACIC VASCULAR SURGERY)
Payer: MEDICAID

## 2023-11-20 ENCOUNTER — HOSPITAL ENCOUNTER (INPATIENT)
Age: 33
LOS: 3 days | Discharge: HOME | DRG: 121 | End: 2023-11-23
Attending: THORACIC SURGERY (CARDIOTHORACIC VASCULAR SURGERY) | Admitting: THORACIC SURGERY (CARDIOTHORACIC VASCULAR SURGERY)
Payer: MEDICAID

## 2023-11-20 DIAGNOSIS — Z01.818 PRE-OP TESTING: Primary | ICD-10-CM

## 2023-11-20 DIAGNOSIS — J93.11 PRIMARY SPONTANEOUS PNEUMOTHORAX: ICD-10-CM

## 2023-11-20 PROBLEM — J93.9 PNEUMOTHORAX: Status: ACTIVE | Noted: 2023-11-20

## 2023-11-20 LAB
ANION GAP SERPL CALCULATED.3IONS-SCNC: 11 MMOL/L (ref 4–16)
ANION GAP SERPL CALCULATED.3IONS-SCNC: 8 MMOL/L (ref 4–16)
APTT: 28 SECONDS (ref 25.1–37.1)
BASE EXCESS: 3 (ref 0–2.4)
BUN SERPL-MCNC: 5 MG/DL (ref 6–23)
BUN SERPL-MCNC: 6 MG/DL (ref 6–23)
CALCIUM SERPL-MCNC: 8.6 MG/DL (ref 8.3–10.6)
CALCIUM SERPL-MCNC: 8.9 MG/DL (ref 8.3–10.6)
CARBON MONOXIDE, BLOOD: 1.7 % (ref 0–5)
CHLORIDE BLD-SCNC: 102 MMOL/L (ref 99–110)
CHLORIDE BLD-SCNC: 103 MMOL/L (ref 99–110)
CO2 CONTENT: 23.8 MMOL/L (ref 19–24)
CO2: 24 MMOL/L (ref 21–32)
CO2: 25 MMOL/L (ref 21–32)
COMMENT: ABNORMAL
CREAT SERPL-MCNC: 0.8 MG/DL (ref 0.6–1.1)
CREAT SERPL-MCNC: 0.9 MG/DL (ref 0.6–1.1)
GFR SERPL CREATININE-BSD FRML MDRD: >60 ML/MIN/1.73M2
GFR SERPL CREATININE-BSD FRML MDRD: >60 ML/MIN/1.73M2
GLUCOSE SERPL-MCNC: 127 MG/DL (ref 70–99)
GLUCOSE SERPL-MCNC: 95 MG/DL (ref 70–99)
HCO3 ARTERIAL: 22.6 MMOL/L (ref 18–23)
HCT VFR BLD CALC: 38.7 % (ref 37–47)
HCT VFR BLD CALC: 39.4 % (ref 37–47)
HEMOGLOBIN: 13.2 GM/DL (ref 12.5–16)
HEMOGLOBIN: 13.4 GM/DL (ref 12.5–16)
INR BLD: 1 INDEX
MAGNESIUM: 2 MG/DL (ref 1.8–2.4)
MCH RBC QN AUTO: 31.1 PG (ref 27–31)
MCH RBC QN AUTO: 31.5 PG (ref 27–31)
MCHC RBC AUTO-ENTMCNC: 33.5 % (ref 32–36)
MCHC RBC AUTO-ENTMCNC: 34.6 % (ref 32–36)
MCV RBC AUTO: 90.8 FL (ref 78–100)
MCV RBC AUTO: 92.7 FL (ref 78–100)
METHEMOGLOBIN ARTERIAL: 1.3 %
O2 SATURATION: 96.5 % (ref 96–97)
PCO2 ARTERIAL: 40 MMHG (ref 32–45)
PDW BLD-RTO: 11.6 % (ref 11.7–14.9)
PDW BLD-RTO: 11.7 % (ref 11.7–14.9)
PH BLOOD: 7.36 (ref 7.34–7.45)
PHOSPHORUS: 3.1 MG/DL (ref 2.5–4.9)
PLATELET # BLD: 213 K/CU MM (ref 140–440)
PLATELET # BLD: 241 K/CU MM (ref 140–440)
PMV BLD AUTO: 10.1 FL (ref 7.5–11.1)
PMV BLD AUTO: 10.7 FL (ref 7.5–11.1)
PO2 ARTERIAL: 179 MMHG (ref 75–100)
POTASSIUM SERPL-SCNC: 3.6 MMOL/L (ref 3.5–5.1)
POTASSIUM SERPL-SCNC: 3.9 MMOL/L (ref 3.5–5.1)
PREGNANCY TEST URINE, POC: NEGATIVE
PROTHROMBIN TIME: 13.6 SECONDS (ref 11.7–14.5)
RBC # BLD: 4.25 M/CU MM (ref 4.2–5.4)
RBC # BLD: 4.26 M/CU MM (ref 4.2–5.4)
SODIUM BLD-SCNC: 135 MMOL/L (ref 135–145)
SODIUM BLD-SCNC: 138 MMOL/L (ref 135–145)
WBC # BLD: 11.5 K/CU MM (ref 4–10.5)
WBC # BLD: 6.9 K/CU MM (ref 4–10.5)

## 2023-11-20 PROCEDURE — 0BBG4ZZ EXCISION OF LEFT UPPER LUNG LOBE, PERCUTANEOUS ENDOSCOPIC APPROACH: ICD-10-PCS | Performed by: THORACIC SURGERY (CARDIOTHORACIC VASCULAR SURGERY)

## 2023-11-20 PROCEDURE — 6370000000 HC RX 637 (ALT 250 FOR IP): Performed by: THORACIC SURGERY (CARDIOTHORACIC VASCULAR SURGERY)

## 2023-11-20 PROCEDURE — 2580000003 HC RX 258: Performed by: NURSE PRACTITIONER

## 2023-11-20 PROCEDURE — 85610 PROTHROMBIN TIME: CPT

## 2023-11-20 PROCEDURE — 2700000000 HC OXYGEN THERAPY PER DAY

## 2023-11-20 PROCEDURE — 3E0L3GC INTRODUCTION OF OTHER THERAPEUTIC SUBSTANCE INTO PLEURAL CAVITY, PERCUTANEOUS APPROACH: ICD-10-PCS | Performed by: THORACIC SURGERY (CARDIOTHORACIC VASCULAR SURGERY)

## 2023-11-20 PROCEDURE — 2580000003 HC RX 258: Performed by: PHYSICIAN ASSISTANT

## 2023-11-20 PROCEDURE — 0BBP4ZX EXCISION OF LEFT PLEURA, PERCUTANEOUS ENDOSCOPIC APPROACH, DIAGNOSTIC: ICD-10-PCS | Performed by: THORACIC SURGERY (CARDIOTHORACIC VASCULAR SURGERY)

## 2023-11-20 PROCEDURE — 2500000003 HC RX 250 WO HCPCS: Performed by: PHYSICIAN ASSISTANT

## 2023-11-20 PROCEDURE — 3700000001 HC ADD 15 MINUTES (ANESTHESIA): Performed by: THORACIC SURGERY (CARDIOTHORACIC VASCULAR SURGERY)

## 2023-11-20 PROCEDURE — 85027 COMPLETE CBC AUTOMATED: CPT

## 2023-11-20 PROCEDURE — 6360000002 HC RX W HCPCS: Performed by: ANESTHESIOLOGY

## 2023-11-20 PROCEDURE — 85730 THROMBOPLASTIN TIME PARTIAL: CPT

## 2023-11-20 PROCEDURE — 84100 ASSAY OF PHOSPHORUS: CPT

## 2023-11-20 PROCEDURE — 0B5P4ZZ DESTRUCTION OF LEFT PLEURA, PERCUTANEOUS ENDOSCOPIC APPROACH: ICD-10-PCS | Performed by: THORACIC SURGERY (CARDIOTHORACIC VASCULAR SURGERY)

## 2023-11-20 PROCEDURE — 7100000001 HC PACU RECOVERY - ADDTL 15 MIN: Performed by: THORACIC SURGERY (CARDIOTHORACIC VASCULAR SURGERY)

## 2023-11-20 PROCEDURE — 32656 THORACOSCOPY W/PLEURECTOMY: CPT | Performed by: THORACIC SURGERY (CARDIOTHORACIC VASCULAR SURGERY)

## 2023-11-20 PROCEDURE — 6360000002 HC RX W HCPCS: Performed by: PHYSICIAN ASSISTANT

## 2023-11-20 PROCEDURE — 2720000010 HC SURG SUPPLY STERILE: Performed by: THORACIC SURGERY (CARDIOTHORACIC VASCULAR SURGERY)

## 2023-11-20 PROCEDURE — 2709999900 HC NON-CHARGEABLE SUPPLY: Performed by: THORACIC SURGERY (CARDIOTHORACIC VASCULAR SURGERY)

## 2023-11-20 PROCEDURE — 3600000005 HC SURGERY LEVEL 5 BASE: Performed by: THORACIC SURGERY (CARDIOTHORACIC VASCULAR SURGERY)

## 2023-11-20 PROCEDURE — 6370000000 HC RX 637 (ALT 250 FOR IP): Performed by: PHYSICIAN ASSISTANT

## 2023-11-20 PROCEDURE — 6360000002 HC RX W HCPCS

## 2023-11-20 PROCEDURE — 83735 ASSAY OF MAGNESIUM: CPT

## 2023-11-20 PROCEDURE — 88307 TISSUE EXAM BY PATHOLOGIST: CPT | Performed by: PATHOLOGY

## 2023-11-20 PROCEDURE — 2580000003 HC RX 258: Performed by: THORACIC SURGERY (CARDIOTHORACIC VASCULAR SURGERY)

## 2023-11-20 PROCEDURE — 80048 BASIC METABOLIC PNL TOTAL CA: CPT

## 2023-11-20 PROCEDURE — C1725 CATH, TRANSLUMIN NON-LASER: HCPCS | Performed by: THORACIC SURGERY (CARDIOTHORACIC VASCULAR SURGERY)

## 2023-11-20 PROCEDURE — 32666 THORACOSCOPY W/WEDGE RESECT: CPT | Performed by: THORACIC SURGERY (CARDIOTHORACIC VASCULAR SURGERY)

## 2023-11-20 PROCEDURE — 2500000003 HC RX 250 WO HCPCS: Performed by: THORACIC SURGERY (CARDIOTHORACIC VASCULAR SURGERY)

## 2023-11-20 PROCEDURE — 6370000000 HC RX 637 (ALT 250 FOR IP): Performed by: NURSE PRACTITIONER

## 2023-11-20 PROCEDURE — 94640 AIRWAY INHALATION TREATMENT: CPT

## 2023-11-20 PROCEDURE — 0B5P0ZZ DESTRUCTION OF LEFT PLEURA, OPEN APPROACH: ICD-10-PCS | Performed by: THORACIC SURGERY (CARDIOTHORACIC VASCULAR SURGERY)

## 2023-11-20 PROCEDURE — 36600 WITHDRAWAL OF ARTERIAL BLOOD: CPT

## 2023-11-20 PROCEDURE — 71045 X-RAY EXAM CHEST 1 VIEW: CPT

## 2023-11-20 PROCEDURE — 81025 URINE PREGNANCY TEST: CPT

## 2023-11-20 PROCEDURE — 7100000000 HC PACU RECOVERY - FIRST 15 MIN: Performed by: THORACIC SURGERY (CARDIOTHORACIC VASCULAR SURGERY)

## 2023-11-20 PROCEDURE — 2500000003 HC RX 250 WO HCPCS

## 2023-11-20 PROCEDURE — 3600000015 HC SURGERY LEVEL 5 ADDTL 15MIN: Performed by: THORACIC SURGERY (CARDIOTHORACIC VASCULAR SURGERY)

## 2023-11-20 PROCEDURE — 82803 BLOOD GASES ANY COMBINATION: CPT

## 2023-11-20 PROCEDURE — 0BBG4ZX EXCISION OF LEFT UPPER LUNG LOBE, PERCUTANEOUS ENDOSCOPIC APPROACH, DIAGNOSTIC: ICD-10-PCS | Performed by: THORACIC SURGERY (CARDIOTHORACIC VASCULAR SURGERY)

## 2023-11-20 PROCEDURE — 93005 ELECTROCARDIOGRAM TRACING: CPT | Performed by: PHYSICIAN ASSISTANT

## 2023-11-20 PROCEDURE — 0BBP0ZZ EXCISION OF LEFT PLEURA, OPEN APPROACH: ICD-10-PCS | Performed by: THORACIC SURGERY (CARDIOTHORACIC VASCULAR SURGERY)

## 2023-11-20 PROCEDURE — 86850 RBC ANTIBODY SCREEN: CPT

## 2023-11-20 PROCEDURE — 32650 THORACOSCOPY W/PLEURODESIS: CPT | Performed by: THORACIC SURGERY (CARDIOTHORACIC VASCULAR SURGERY)

## 2023-11-20 PROCEDURE — 6360000002 HC RX W HCPCS: Performed by: THORACIC SURGERY (CARDIOTHORACIC VASCULAR SURGERY)

## 2023-11-20 PROCEDURE — 86901 BLOOD TYPING SEROLOGIC RH(D): CPT

## 2023-11-20 PROCEDURE — C1729 CATH, DRAINAGE: HCPCS | Performed by: THORACIC SURGERY (CARDIOTHORACIC VASCULAR SURGERY)

## 2023-11-20 PROCEDURE — 2000000000 HC ICU R&B

## 2023-11-20 PROCEDURE — 86900 BLOOD TYPING SEROLOGIC ABO: CPT

## 2023-11-20 PROCEDURE — 88305 TISSUE EXAM BY PATHOLOGIST: CPT | Performed by: PATHOLOGY

## 2023-11-20 PROCEDURE — 3700000000 HC ANESTHESIA ATTENDED CARE: Performed by: THORACIC SURGERY (CARDIOTHORACIC VASCULAR SURGERY)

## 2023-11-20 PROCEDURE — 6360000002 HC RX W HCPCS: Performed by: NURSE PRACTITIONER

## 2023-11-20 PROCEDURE — 94761 N-INVAS EAR/PLS OXIMETRY MLT: CPT

## 2023-11-20 RX ORDER — IPRATROPIUM BROMIDE AND ALBUTEROL SULFATE 2.5; .5 MG/3ML; MG/3ML
1 SOLUTION RESPIRATORY (INHALATION)
Status: DISCONTINUED | OUTPATIENT
Start: 2023-11-20 | End: 2023-11-22

## 2023-11-20 RX ORDER — POTASSIUM CHLORIDE 20 MEQ/1
40 TABLET, EXTENDED RELEASE ORAL PRN
Status: DISCONTINUED | OUTPATIENT
Start: 2023-11-20 | End: 2023-11-23 | Stop reason: HOSPADM

## 2023-11-20 RX ORDER — ONDANSETRON 2 MG/ML
4 INJECTION INTRAMUSCULAR; INTRAVENOUS ONCE
Status: COMPLETED | OUTPATIENT
Start: 2023-11-20 | End: 2023-11-20

## 2023-11-20 RX ORDER — SULFAMETHOXAZOLE AND TRIMETHOPRIM 800; 160 MG/1; MG/1
1 TABLET ORAL 2 TIMES DAILY
Status: DISCONTINUED | OUTPATIENT
Start: 2023-11-20 | End: 2023-11-20

## 2023-11-20 RX ORDER — SODIUM CHLORIDE 9 MG/ML
INJECTION, SOLUTION INTRAVENOUS PRN
Status: DISCONTINUED | OUTPATIENT
Start: 2023-11-20 | End: 2023-11-23 | Stop reason: HOSPADM

## 2023-11-20 RX ORDER — KETOROLAC TROMETHAMINE 30 MG/ML
INJECTION, SOLUTION INTRAMUSCULAR; INTRAVENOUS PRN
Status: DISCONTINUED | OUTPATIENT
Start: 2023-11-20 | End: 2023-11-20 | Stop reason: SDUPTHER

## 2023-11-20 RX ORDER — MIDAZOLAM HYDROCHLORIDE 1 MG/ML
INJECTION INTRAMUSCULAR; INTRAVENOUS PRN
Status: DISCONTINUED | OUTPATIENT
Start: 2023-11-20 | End: 2023-11-20 | Stop reason: SDUPTHER

## 2023-11-20 RX ORDER — GABAPENTIN 300 MG/1
300 CAPSULE ORAL 3 TIMES DAILY
Status: DISCONTINUED | OUTPATIENT
Start: 2023-11-20 | End: 2023-11-23 | Stop reason: HOSPADM

## 2023-11-20 RX ORDER — KETOROLAC TROMETHAMINE 30 MG/ML
15 INJECTION, SOLUTION INTRAMUSCULAR; INTRAVENOUS EVERY 6 HOURS
Status: COMPLETED | OUTPATIENT
Start: 2023-11-20 | End: 2023-11-22

## 2023-11-20 RX ORDER — SODIUM CHLORIDE 0.9 % (FLUSH) 0.9 %
5-40 SYRINGE (ML) INJECTION PRN
Status: DISCONTINUED | OUTPATIENT
Start: 2023-11-20 | End: 2023-11-20 | Stop reason: HOSPADM

## 2023-11-20 RX ORDER — POTASSIUM CHLORIDE 7.45 MG/ML
10 INJECTION INTRAVENOUS PRN
Status: DISCONTINUED | OUTPATIENT
Start: 2023-11-20 | End: 2023-11-23 | Stop reason: HOSPADM

## 2023-11-20 RX ORDER — GINSENG 100 MG
CAPSULE ORAL DAILY
Status: DISCONTINUED | OUTPATIENT
Start: 2023-11-20 | End: 2023-11-23 | Stop reason: HOSPADM

## 2023-11-20 RX ORDER — SODIUM CHLORIDE 0.9 % (FLUSH) 0.9 %
5-40 SYRINGE (ML) INJECTION EVERY 12 HOURS SCHEDULED
Status: DISCONTINUED | OUTPATIENT
Start: 2023-11-20 | End: 2023-11-20 | Stop reason: HOSPADM

## 2023-11-20 RX ORDER — ONDANSETRON 2 MG/ML
4 INJECTION INTRAMUSCULAR; INTRAVENOUS EVERY 6 HOURS PRN
Status: DISCONTINUED | OUTPATIENT
Start: 2023-11-20 | End: 2023-11-23 | Stop reason: HOSPADM

## 2023-11-20 RX ORDER — SODIUM CHLORIDE 9 MG/ML
INJECTION, SOLUTION INTRAVENOUS PRN
Status: DISCONTINUED | OUTPATIENT
Start: 2023-11-20 | End: 2023-11-20 | Stop reason: HOSPADM

## 2023-11-20 RX ORDER — FENTANYL CITRATE 50 UG/ML
25 INJECTION, SOLUTION INTRAMUSCULAR; INTRAVENOUS EVERY 5 MIN PRN
Status: DISCONTINUED | OUTPATIENT
Start: 2023-11-20 | End: 2023-11-20 | Stop reason: HOSPADM

## 2023-11-20 RX ORDER — TRAMADOL HYDROCHLORIDE 50 MG/1
50 TABLET ORAL EVERY 6 HOURS PRN
Status: DISCONTINUED | OUTPATIENT
Start: 2023-11-20 | End: 2023-11-23 | Stop reason: HOSPADM

## 2023-11-20 RX ORDER — BUPIVACAINE HYDROCHLORIDE 5 MG/ML
INJECTION, SOLUTION EPIDURAL; INTRACAUDAL
Status: COMPLETED | OUTPATIENT
Start: 2023-11-20 | End: 2023-11-20

## 2023-11-20 RX ORDER — ENOXAPARIN SODIUM 100 MG/ML
30 INJECTION SUBCUTANEOUS DAILY
Status: DISCONTINUED | OUTPATIENT
Start: 2023-11-20 | End: 2023-11-23 | Stop reason: HOSPADM

## 2023-11-20 RX ORDER — SODIUM CHLORIDE 0.9 % (FLUSH) 0.9 %
5-40 SYRINGE (ML) INJECTION PRN
Status: DISCONTINUED | OUTPATIENT
Start: 2023-11-20 | End: 2023-11-23 | Stop reason: HOSPADM

## 2023-11-20 RX ORDER — ACETAMINOPHEN 500 MG
1000 TABLET ORAL EVERY 6 HOURS
Status: DISCONTINUED | OUTPATIENT
Start: 2023-11-20 | End: 2023-11-20

## 2023-11-20 RX ORDER — PROPOFOL 10 MG/ML
INJECTION, EMULSION INTRAVENOUS PRN
Status: DISCONTINUED | OUTPATIENT
Start: 2023-11-20 | End: 2023-11-20 | Stop reason: SDUPTHER

## 2023-11-20 RX ORDER — HYDRALAZINE HYDROCHLORIDE 20 MG/ML
10 INJECTION INTRAMUSCULAR; INTRAVENOUS
Status: DISCONTINUED | OUTPATIENT
Start: 2023-11-20 | End: 2023-11-20 | Stop reason: HOSPADM

## 2023-11-20 RX ORDER — GABAPENTIN 300 MG/1
300 CAPSULE ORAL ONCE
Status: COMPLETED | OUTPATIENT
Start: 2023-11-20 | End: 2023-11-20

## 2023-11-20 RX ORDER — ONDANSETRON 4 MG/1
4 TABLET, ORALLY DISINTEGRATING ORAL EVERY 8 HOURS PRN
Status: DISCONTINUED | OUTPATIENT
Start: 2023-11-20 | End: 2023-11-23 | Stop reason: HOSPADM

## 2023-11-20 RX ORDER — ONDANSETRON 2 MG/ML
4 INJECTION INTRAMUSCULAR; INTRAVENOUS
Status: DISCONTINUED | OUTPATIENT
Start: 2023-11-20 | End: 2023-11-20 | Stop reason: HOSPADM

## 2023-11-20 RX ORDER — SODIUM CHLORIDE, SODIUM LACTATE, POTASSIUM CHLORIDE, CALCIUM CHLORIDE 600; 310; 30; 20 MG/100ML; MG/100ML; MG/100ML; MG/100ML
INJECTION, SOLUTION INTRAVENOUS CONTINUOUS
Status: DISCONTINUED | OUTPATIENT
Start: 2023-11-20 | End: 2023-11-20 | Stop reason: HOSPADM

## 2023-11-20 RX ORDER — FAMOTIDINE 10 MG/ML
20 INJECTION, SOLUTION INTRAVENOUS 2 TIMES DAILY
Status: DISCONTINUED | OUTPATIENT
Start: 2023-11-20 | End: 2023-11-23 | Stop reason: HOSPADM

## 2023-11-20 RX ORDER — TRAMADOL HYDROCHLORIDE 50 MG/1
100 TABLET ORAL ONCE
Status: COMPLETED | OUTPATIENT
Start: 2023-11-20 | End: 2023-11-20

## 2023-11-20 RX ORDER — SODIUM CHLORIDE, SODIUM LACTATE, POTASSIUM CHLORIDE, CALCIUM CHLORIDE 600; 310; 30; 20 MG/100ML; MG/100ML; MG/100ML; MG/100ML
INJECTION, SOLUTION INTRAVENOUS CONTINUOUS
Status: DISCONTINUED | OUTPATIENT
Start: 2023-11-20 | End: 2023-11-21

## 2023-11-20 RX ORDER — FENTANYL CITRATE 50 UG/ML
INJECTION, SOLUTION INTRAMUSCULAR; INTRAVENOUS PRN
Status: DISCONTINUED | OUTPATIENT
Start: 2023-11-20 | End: 2023-11-20 | Stop reason: SDUPTHER

## 2023-11-20 RX ORDER — LABETALOL HYDROCHLORIDE 5 MG/ML
10 INJECTION, SOLUTION INTRAVENOUS
Status: DISCONTINUED | OUTPATIENT
Start: 2023-11-20 | End: 2023-11-20 | Stop reason: HOSPADM

## 2023-11-20 RX ORDER — FAMOTIDINE 20 MG/1
20 TABLET, FILM COATED ORAL 2 TIMES DAILY
Status: DISCONTINUED | OUTPATIENT
Start: 2023-11-20 | End: 2023-11-23 | Stop reason: HOSPADM

## 2023-11-20 RX ORDER — SULFAMETHOXAZOLE AND TRIMETHOPRIM 800; 160 MG/1; MG/1
1 TABLET ORAL 2 TIMES DAILY
Status: DISCONTINUED | OUTPATIENT
Start: 2023-11-20 | End: 2023-11-21

## 2023-11-20 RX ORDER — ACETAMINOPHEN 500 MG
1000 TABLET ORAL ONCE
Status: DISCONTINUED | OUTPATIENT
Start: 2023-11-20 | End: 2023-11-20 | Stop reason: HOSPADM

## 2023-11-20 RX ORDER — CELECOXIB 200 MG/1
200 CAPSULE ORAL 2 TIMES DAILY
Status: DISCONTINUED | OUTPATIENT
Start: 2023-11-22 | End: 2023-11-23 | Stop reason: HOSPADM

## 2023-11-20 RX ORDER — MAGNESIUM SULFATE IN WATER 40 MG/ML
2000 INJECTION, SOLUTION INTRAVENOUS PRN
Status: DISCONTINUED | OUTPATIENT
Start: 2023-11-20 | End: 2023-11-23 | Stop reason: HOSPADM

## 2023-11-20 RX ORDER — ROCURONIUM BROMIDE 10 MG/ML
INJECTION, SOLUTION INTRAVENOUS PRN
Status: DISCONTINUED | OUTPATIENT
Start: 2023-11-20 | End: 2023-11-20 | Stop reason: SDUPTHER

## 2023-11-20 RX ORDER — SODIUM CHLORIDE 0.9 % (FLUSH) 0.9 %
5-40 SYRINGE (ML) INJECTION EVERY 12 HOURS SCHEDULED
Status: DISCONTINUED | OUTPATIENT
Start: 2023-11-20 | End: 2023-11-23 | Stop reason: HOSPADM

## 2023-11-20 RX ORDER — ALBUTEROL SULFATE 90 UG/1
2 AEROSOL, METERED RESPIRATORY (INHALATION) EVERY 6 HOURS PRN
Status: DISCONTINUED | OUTPATIENT
Start: 2023-11-20 | End: 2023-11-23 | Stop reason: HOSPADM

## 2023-11-20 RX ORDER — FENTANYL CITRATE 50 UG/ML
50 INJECTION, SOLUTION INTRAMUSCULAR; INTRAVENOUS EVERY 5 MIN PRN
Status: DISCONTINUED | OUTPATIENT
Start: 2023-11-20 | End: 2023-11-20 | Stop reason: HOSPADM

## 2023-11-20 RX ORDER — HYDRALAZINE HYDROCHLORIDE 20 MG/ML
INJECTION INTRAMUSCULAR; INTRAVENOUS PRN
Status: DISCONTINUED | OUTPATIENT
Start: 2023-11-20 | End: 2023-11-20 | Stop reason: SDUPTHER

## 2023-11-20 RX ORDER — POLYETHYLENE GLYCOL 3350 17 G/17G
17 POWDER, FOR SOLUTION ORAL DAILY
Status: DISCONTINUED | OUTPATIENT
Start: 2023-11-20 | End: 2023-11-23 | Stop reason: HOSPADM

## 2023-11-20 RX ORDER — ONDANSETRON 2 MG/ML
INJECTION INTRAMUSCULAR; INTRAVENOUS PRN
Status: DISCONTINUED | OUTPATIENT
Start: 2023-11-20 | End: 2023-11-20 | Stop reason: SDUPTHER

## 2023-11-20 RX ORDER — DEXAMETHASONE SODIUM PHOSPHATE 4 MG/ML
INJECTION, SOLUTION INTRA-ARTICULAR; INTRALESIONAL; INTRAMUSCULAR; INTRAVENOUS; SOFT TISSUE PRN
Status: DISCONTINUED | OUTPATIENT
Start: 2023-11-20 | End: 2023-11-20 | Stop reason: SDUPTHER

## 2023-11-20 RX ADMIN — ONDANSETRON 4 MG: 2 INJECTION INTRAMUSCULAR; INTRAVENOUS at 17:51

## 2023-11-20 RX ADMIN — KETOROLAC TROMETHAMINE 15 MG: 30 INJECTION, SOLUTION INTRAMUSCULAR; INTRAVENOUS at 20:34

## 2023-11-20 RX ADMIN — KETOROLAC TROMETHAMINE 30 MG: 30 INJECTION, SOLUTION INTRAMUSCULAR at 12:14

## 2023-11-20 RX ADMIN — SUGAMMADEX 200 MG: 100 INJECTION, SOLUTION INTRAVENOUS at 12:36

## 2023-11-20 RX ADMIN — SODIUM CHLORIDE, POTASSIUM CHLORIDE, SODIUM LACTATE AND CALCIUM CHLORIDE: 600; 310; 30; 20 INJECTION, SOLUTION INTRAVENOUS at 15:38

## 2023-11-20 RX ADMIN — FAMOTIDINE 20 MG: 10 INJECTION, SOLUTION INTRAVENOUS at 20:34

## 2023-11-20 RX ADMIN — KETOROLAC TROMETHAMINE 15 MG: 30 INJECTION, SOLUTION INTRAMUSCULAR; INTRAVENOUS at 14:38

## 2023-11-20 RX ADMIN — GABAPENTIN 300 MG: 300 CAPSULE ORAL at 17:27

## 2023-11-20 RX ADMIN — PROPOFOL 100 MG: 10 INJECTION, EMULSION INTRAVENOUS at 11:24

## 2023-11-20 RX ADMIN — DOXYCYCLINE 500 MG: 100 INJECTION, POWDER, LYOPHILIZED, FOR SOLUTION INTRAVENOUS at 12:04

## 2023-11-20 RX ADMIN — PROPOFOL 50 MG: 10 INJECTION, EMULSION INTRAVENOUS at 11:53

## 2023-11-20 RX ADMIN — POLYETHYLENE GLYCOL (3350) 17 G: 17 POWDER, FOR SOLUTION ORAL at 14:39

## 2023-11-20 RX ADMIN — DEXAMETHASONE SODIUM PHOSPHATE 8 MG: 4 INJECTION INTRA-ARTICULAR; INTRALESIONAL; INTRAMUSCULAR; INTRAVENOUS; SOFT TISSUE at 11:28

## 2023-11-20 RX ADMIN — ROCURONIUM BROMIDE 10 MG: 50 INJECTION INTRAVENOUS at 12:06

## 2023-11-20 RX ADMIN — MIDAZOLAM 2 MG: 1 INJECTION INTRAMUSCULAR; INTRAVENOUS at 11:14

## 2023-11-20 RX ADMIN — HYDROMORPHONE HYDROCHLORIDE 0.5 MG: 1 INJECTION, SOLUTION INTRAMUSCULAR; INTRAVENOUS; SUBCUTANEOUS at 12:44

## 2023-11-20 RX ADMIN — FAMOTIDINE 20 MG: 20 TABLET ORAL at 14:38

## 2023-11-20 RX ADMIN — GABAPENTIN 300 MG: 300 CAPSULE ORAL at 09:23

## 2023-11-20 RX ADMIN — HYDRALAZINE HYDROCHLORIDE 5 MG: 20 INJECTION INTRAMUSCULAR; INTRAVENOUS at 12:27

## 2023-11-20 RX ADMIN — IPRATROPIUM BROMIDE AND ALBUTEROL SULFATE 1 DOSE: 2.5; .5 SOLUTION RESPIRATORY (INHALATION) at 18:54

## 2023-11-20 RX ADMIN — CEFAZOLIN 1000 MG: 1 INJECTION, POWDER, FOR SOLUTION INTRAMUSCULAR; INTRAVENOUS; PARENTERAL at 11:28

## 2023-11-20 RX ADMIN — HYDROMORPHONE HYDROCHLORIDE 0.5 MG: 1 INJECTION, SOLUTION INTRAMUSCULAR; INTRAVENOUS; SUBCUTANEOUS at 12:21

## 2023-11-20 RX ADMIN — SODIUM CHLORIDE, POTASSIUM CHLORIDE, SODIUM LACTATE AND CALCIUM CHLORIDE: 600; 310; 30; 20 INJECTION, SOLUTION INTRAVENOUS at 09:29

## 2023-11-20 RX ADMIN — TRAMADOL HYDROCHLORIDE 100 MG: 50 TABLET, COATED ORAL at 09:24

## 2023-11-20 RX ADMIN — ONDANSETRON 4 MG: 2 INJECTION INTRAMUSCULAR; INTRAVENOUS at 10:44

## 2023-11-20 RX ADMIN — ROCURONIUM BROMIDE 40 MG: 50 INJECTION INTRAVENOUS at 11:24

## 2023-11-20 RX ADMIN — SODIUM CHLORIDE, POTASSIUM CHLORIDE, SODIUM LACTATE AND CALCIUM CHLORIDE: 600; 310; 30; 20 INJECTION, SOLUTION INTRAVENOUS at 13:36

## 2023-11-20 RX ADMIN — SULFAMETHOXAZOLE AND TRIMETHOPRIM 1 TABLET: 800; 160 TABLET ORAL at 17:27

## 2023-11-20 RX ADMIN — SODIUM CHLORIDE, PRESERVATIVE FREE 5 ML: 5 INJECTION INTRAVENOUS at 20:43

## 2023-11-20 RX ADMIN — ONDANSETRON 4 MG: 2 INJECTION INTRAMUSCULAR; INTRAVENOUS at 12:14

## 2023-11-20 RX ADMIN — FENTANYL CITRATE 100 MCG: 50 INJECTION, SOLUTION INTRAMUSCULAR; INTRAVENOUS at 11:23

## 2023-11-20 ASSESSMENT — PAIN DESCRIPTION - DESCRIPTORS
DESCRIPTORS: DISCOMFORT
DESCRIPTORS: DISCOMFORT
DESCRIPTORS: ACHING

## 2023-11-20 ASSESSMENT — PAIN DESCRIPTION - ORIENTATION
ORIENTATION: LEFT

## 2023-11-20 ASSESSMENT — PAIN DESCRIPTION - LOCATION
LOCATION: RIB CAGE
LOCATION: BACK
LOCATION: RIB CAGE

## 2023-11-20 ASSESSMENT — PAIN SCALES - GENERAL
PAINLEVEL_OUTOF10: 5
PAINLEVEL_OUTOF10: 2
PAINLEVEL_OUTOF10: 2

## 2023-11-20 ASSESSMENT — PAIN - FUNCTIONAL ASSESSMENT: PAIN_FUNCTIONAL_ASSESSMENT: 0-10

## 2023-11-20 NOTE — BRIEF OP NOTE
Brief Postoperative Note      Patient: Cesar Gee  YOB: 1990  MRN: [id number]    Date of Procedure: 11/20/2023    Pre-Op Diagnosis Codes:     * Primary spontaneous pneumothorax [J93.11]    Post-Op Diagnosis: Same       Procedure(s):  LEFT VATS THORACOSCOPY; LEFT UPPER LOBE WEDGE RESECTION; MECHANICAL AND CHEMICAL PLEURODESIS; PLEURECTOMY    Surgeon(s):  Skye Cope MD    Assistant:  Surgical Assistant: Kel Collins    Anesthesia: General    Estimated Blood Loss (mL): Minimal    Complications: None    Specimens:   ID Type Source Tests Collected by Time Destination   A : LEFT UPPER LOBE WEDGE Tissue Lung SURGICAL PATHOLOGY Skye Cope MD 11/20/2023 1155    B : LEFT PARIETAL PLEURA Tissue Lung SURGICAL PATHOLOGY Skye Cope MD 11/20/2023 1214        Implants:  * No implants in log *      Drains:   Chest Tube Left Pleural 1 (Active)   Chest Tube Airleak No 11/20/23 1340   Status Continuous Suction 11/20/23 1339   Suction -20 cm H2O 11/20/23 1339   Y Connector Used No 11/20/23 1340   Drainage Description Dark red 11/20/23 1339   Dressing Status Clean, dry & intact 11/20/23 1340   Site Assessment Clean, dry & intact 11/20/23 1340   Surrounding Skin Clean, dry & intact 11/20/23 1340   Output (ml) 60 ml 11/20/23 1339       Findings: Please see Full Op Note       Electronically signed by AUGUSTUS Dwyer on 11/20/2023 at 2:20 PM

## 2023-11-20 NOTE — FLOWSHEET NOTE
4 Eyes Skin Assessment     NAME:  Suhail Magana  YOB: 1990  MEDICAL RECORD NUMBER:  [id number]    The patient is being assessed for  Admission    I agree that at least one RN has performed a thorough Head to Toe Skin Assessment on the patient. ALL assessment sites listed below have been assessed. Areas assessed by both nurses:    Head, Face, Ears, Shoulders, Back, Chest, Arms, Elbows, Hands, Sacrum. Buttock, Coccyx, Ischium, Legs. Feet and Heels, and Under Medical Devices         Does the Patient have a Wound?  No noted wound(s)       Jose Guadalupe Prevention initiated by RN: No  Wound Care Orders initiated by RN: No    Pressure Injury (Stage 3,4, Unstageable, DTI, NWPT, and Complex wounds) if present, place Wound referral order by RN under : No    New Ostomies, if present place, Ostomy referral order under : No     Nurse 1 eSignature: Electronically signed by Nancy Herring RN on 11/20/23 at 2:17 PM EST    **SHARE this note so that the co-signing nurse can place an eSignature**    Nurse 2 eSignature: Electronically signed by Harrison Valverde RN on 11/20/23 at 3:16 PM EST

## 2023-11-21 ENCOUNTER — APPOINTMENT (OUTPATIENT)
Dept: GENERAL RADIOLOGY | Age: 33
DRG: 121 | End: 2023-11-21
Attending: THORACIC SURGERY (CARDIOTHORACIC VASCULAR SURGERY)
Payer: MEDICAID

## 2023-11-21 LAB
ANION GAP SERPL CALCULATED.3IONS-SCNC: 12 MMOL/L (ref 4–16)
BUN SERPL-MCNC: 8 MG/DL (ref 6–23)
CALCIUM SERPL-MCNC: 8.8 MG/DL (ref 8.3–10.6)
CHLORIDE BLD-SCNC: 102 MMOL/L (ref 99–110)
CO2: 23 MMOL/L (ref 21–32)
CREAT SERPL-MCNC: 0.9 MG/DL (ref 0.6–1.1)
EKG ATRIAL RATE: 54 BPM
EKG DIAGNOSIS: NORMAL
EKG P AXIS: 60 DEGREES
EKG P-R INTERVAL: 116 MS
EKG Q-T INTERVAL: 456 MS
EKG QRS DURATION: 96 MS
EKG QTC CALCULATION (BAZETT): 432 MS
EKG R AXIS: 69 DEGREES
EKG T AXIS: 8 DEGREES
EKG VENTRICULAR RATE: 54 BPM
GFR SERPL CREATININE-BSD FRML MDRD: >60 ML/MIN/1.73M2
GLUCOSE SERPL-MCNC: 106 MG/DL (ref 70–99)
HCT VFR BLD CALC: 36.9 % (ref 37–47)
HEMOGLOBIN: 12.4 GM/DL (ref 12.5–16)
MAGNESIUM: 1.9 MG/DL (ref 1.8–2.4)
MCH RBC QN AUTO: 31.2 PG (ref 27–31)
MCHC RBC AUTO-ENTMCNC: 33.6 % (ref 32–36)
MCV RBC AUTO: 92.7 FL (ref 78–100)
PDW BLD-RTO: 11.9 % (ref 11.7–14.9)
PHOSPHORUS: 3.1 MG/DL (ref 2.5–4.9)
PLATELET # BLD: 193 K/CU MM (ref 140–440)
PMV BLD AUTO: 10.5 FL (ref 7.5–11.1)
POTASSIUM SERPL-SCNC: 4.6 MMOL/L (ref 3.5–5.1)
RBC # BLD: 3.98 M/CU MM (ref 4.2–5.4)
SODIUM BLD-SCNC: 137 MMOL/L (ref 135–145)
WBC # BLD: 13.8 K/CU MM (ref 4–10.5)

## 2023-11-21 PROCEDURE — 71046 X-RAY EXAM CHEST 2 VIEWS: CPT

## 2023-11-21 PROCEDURE — 6370000000 HC RX 637 (ALT 250 FOR IP): Performed by: PHYSICIAN ASSISTANT

## 2023-11-21 PROCEDURE — 85027 COMPLETE CBC AUTOMATED: CPT

## 2023-11-21 PROCEDURE — 2580000003 HC RX 258: Performed by: PHYSICIAN ASSISTANT

## 2023-11-21 PROCEDURE — 83735 ASSAY OF MAGNESIUM: CPT

## 2023-11-21 PROCEDURE — 93010 ELECTROCARDIOGRAM REPORT: CPT | Performed by: INTERNAL MEDICINE

## 2023-11-21 PROCEDURE — 6360000002 HC RX W HCPCS: Performed by: PHYSICIAN ASSISTANT

## 2023-11-21 PROCEDURE — 94761 N-INVAS EAR/PLS OXIMETRY MLT: CPT

## 2023-11-21 PROCEDURE — 6370000000 HC RX 637 (ALT 250 FOR IP): Performed by: THORACIC SURGERY (CARDIOTHORACIC VASCULAR SURGERY)

## 2023-11-21 PROCEDURE — 2000000000 HC ICU R&B

## 2023-11-21 PROCEDURE — 84100 ASSAY OF PHOSPHORUS: CPT

## 2023-11-21 PROCEDURE — 97162 PT EVAL MOD COMPLEX 30 MIN: CPT

## 2023-11-21 PROCEDURE — 80048 BASIC METABOLIC PNL TOTAL CA: CPT

## 2023-11-21 PROCEDURE — 94640 AIRWAY INHALATION TREATMENT: CPT

## 2023-11-21 RX ORDER — NITROFURANTOIN 25; 75 MG/1; MG/1
100 CAPSULE ORAL EVERY 12 HOURS SCHEDULED
Status: DISCONTINUED | OUTPATIENT
Start: 2023-11-21 | End: 2023-11-23 | Stop reason: HOSPADM

## 2023-11-21 RX ORDER — NITROFURANTOIN 25; 75 MG/1; MG/1
100 CAPSULE ORAL ONCE
Status: COMPLETED | OUTPATIENT
Start: 2023-11-21 | End: 2023-11-21

## 2023-11-21 RX ADMIN — TRAMADOL HYDROCHLORIDE 50 MG: 50 TABLET, COATED ORAL at 12:29

## 2023-11-21 RX ADMIN — IPRATROPIUM BROMIDE AND ALBUTEROL SULFATE 1 DOSE: 2.5; .5 SOLUTION RESPIRATORY (INHALATION) at 16:07

## 2023-11-21 RX ADMIN — ENOXAPARIN SODIUM 30 MG: 100 INJECTION SUBCUTANEOUS at 08:53

## 2023-11-21 RX ADMIN — IPRATROPIUM BROMIDE AND ALBUTEROL SULFATE 1 DOSE: 2.5; .5 SOLUTION RESPIRATORY (INHALATION) at 08:23

## 2023-11-21 RX ADMIN — KETOROLAC TROMETHAMINE 15 MG: 30 INJECTION, SOLUTION INTRAMUSCULAR; INTRAVENOUS at 08:53

## 2023-11-21 RX ADMIN — KETOROLAC TROMETHAMINE 15 MG: 30 INJECTION, SOLUTION INTRAMUSCULAR; INTRAVENOUS at 03:12

## 2023-11-21 RX ADMIN — GABAPENTIN 300 MG: 300 CAPSULE ORAL at 15:24

## 2023-11-21 RX ADMIN — IPRATROPIUM BROMIDE AND ALBUTEROL SULFATE 1 DOSE: 2.5; .5 SOLUTION RESPIRATORY (INHALATION) at 19:21

## 2023-11-21 RX ADMIN — FAMOTIDINE 20 MG: 20 TABLET ORAL at 20:29

## 2023-11-21 RX ADMIN — NITROFURANTOIN MONOHYDRATE/MACROCRYSTALS 100 MG: 75; 25 CAPSULE ORAL at 20:31

## 2023-11-21 RX ADMIN — IPRATROPIUM BROMIDE AND ALBUTEROL SULFATE 1 DOSE: 2.5; .5 SOLUTION RESPIRATORY (INHALATION) at 12:07

## 2023-11-21 RX ADMIN — FAMOTIDINE 20 MG: 20 TABLET ORAL at 08:53

## 2023-11-21 RX ADMIN — SODIUM CHLORIDE, PRESERVATIVE FREE 5 ML: 5 INJECTION INTRAVENOUS at 20:30

## 2023-11-21 RX ADMIN — GABAPENTIN 300 MG: 300 CAPSULE ORAL at 08:53

## 2023-11-21 RX ADMIN — KETOROLAC TROMETHAMINE 15 MG: 30 INJECTION, SOLUTION INTRAMUSCULAR; INTRAVENOUS at 20:29

## 2023-11-21 RX ADMIN — KETOROLAC TROMETHAMINE 15 MG: 30 INJECTION, SOLUTION INTRAMUSCULAR; INTRAVENOUS at 15:24

## 2023-11-21 RX ADMIN — NITROFURANTOIN MONOHYDRATE/MACROCRYSTALS 100 MG: 75; 25 CAPSULE ORAL at 15:25

## 2023-11-21 RX ADMIN — GABAPENTIN 300 MG: 300 CAPSULE ORAL at 20:29

## 2023-11-21 RX ADMIN — SULFAMETHOXAZOLE AND TRIMETHOPRIM 1 TABLET: 800; 160 TABLET ORAL at 05:49

## 2023-11-21 RX ADMIN — SODIUM CHLORIDE, POTASSIUM CHLORIDE, SODIUM LACTATE AND CALCIUM CHLORIDE: 600; 310; 30; 20 INJECTION, SOLUTION INTRAVENOUS at 08:19

## 2023-11-21 ASSESSMENT — PAIN DESCRIPTION - DESCRIPTORS
DESCRIPTORS: DISCOMFORT;ACHING
DESCRIPTORS: ACHING
DESCRIPTORS: ACHING

## 2023-11-21 ASSESSMENT — PAIN SCALES - GENERAL
PAINLEVEL_OUTOF10: 2
PAINLEVEL_OUTOF10: 3
PAINLEVEL_OUTOF10: 2
PAINLEVEL_OUTOF10: 5

## 2023-11-21 ASSESSMENT — PAIN DESCRIPTION - ORIENTATION: ORIENTATION: LEFT

## 2023-11-21 ASSESSMENT — PAIN DESCRIPTION - LOCATION
LOCATION: HEAD
LOCATION: BACK
LOCATION: SHOULDER

## 2023-11-21 NOTE — CONSENT
Informed Consent for Blood Component Transfusion Note    I have discussed with the patient the rationale for blood component transfusion; its benefits in treating or preventing fatigue, organ damage, or death; and its risk which includes mild transfusion reactions, rare risk of blood borne infection, or more serious but rare reactions. I have discussed the alternatives to transfusion, including the risk and consequences of not receiving transfusion. The patient had an opportunity to ask questions and had agreed to proceed with transfusion of blood components.     Electronically signed by Neli Toledo MD on 11/21/23 at 9:05 AM EST

## 2023-11-21 NOTE — PLAN OF CARE
Problem: Discharge Planning  Goal: Discharge to home or other facility with appropriate resources  Outcome: Progressing  Flowsheets (Taken 11/21/2023 0800)  Discharge to home or other facility with appropriate resources:   Identify barriers to discharge with patient and caregiver   Arrange for needed discharge resources and transportation as appropriate   Identify discharge learning needs (meds, wound care, etc)   Arrange for interpreters to assist at discharge as needed   Refer to discharge planning if patient needs post-hospital services based on physician order or complex needs related to functional status, cognitive ability or social support system     Problem: Pain  Goal: Verbalizes/displays adequate comfort level or baseline comfort level  Outcome: Progressing     Problem: ABCDS Injury Assessment  Goal: Absence of physical injury  Outcome: Progressing

## 2023-11-21 NOTE — OP NOTE
11[facility]20/2023    [facility]    PREOPERATIVE DIAGNOSIS   Left spontaneous pneumothorax  Hx of right spontaneous pneumothorax    POSTOPERATIVE DIAGNOSIS   Left spontaneous pneumothorax  Hx of right spontaneous pneumothorax    OPERATION   1. Left VATS. 2. Pulmonary wedge resection X1 (03441)  3. Parietal pleurectomy. ([phone])  4. Doxycycline pleurodesis (79864)  5. Mechanical pleurodesis (41481)     First Assistant:  Dariel Nixon SA    EBL:  5 mL     PQRI MEASURES   Patient received appropriate antibiotics. DESCRIPTION OF THE PROCEDURE   The patient was brought to the operating room, placed in supine position on the operating table. After adequate general anesthesia, a double-lumen tube was placed by the anesthesia team.   The patient was placed in the decubitus position with the left side up. Left chest was prepped and draped in the standard surgical fashion. A 5-mm port was placed in the midaxillary line above the 7th rib. Chest cavity was explored. CO2 was used for insufflation. A 5-mm port was introduced two ribs below the fissure at the level of the spinous process. A third port, 5 mm, was introduced  in the posterior axillary line above the 7th rib. Anterior port was upgraded to a 12 mm port to fit the stapler. Apical cap of the left upper lobe looks abnormal therefore I proceed with blebectomy of this area. I used Vinton 60 stapler reinforced with Gortex strip. Parietal pleurectomy was performed obtaining excellent rough surface in the inner chest wall. Mechanical pleurectomy was perform in the inferior aspect of the chest cavity. Hemostasis was obtained. Doxycycline 500 mg solution was drained in the chest cavity. Intercostal block was performed with Marcaine solution. One chest tubes 24 Fr was placed anteriorly and posteriorly. Lung ventilate nicely and all instruments were removed. Incisions were closed in different layers.       Patient tolerated the procedure very well

## 2023-11-21 NOTE — CARE COORDINATION
CM reviewed chart. CM met with pt at bedside. Pt has PCP([facility])/Ins. Pt lives with her mother and her step-father. Pt does not drive and depends on her mom, step-father, and friends for transportation to and from work. Pt has a 15year old that is being taken care of by her parents. CM will continue following for any future needs. 11/21/23 1145   Service Assessment   Patient Orientation Alert and Oriented;Person;Place;Situation   Cognition Alert   History Provided By Patient;Medical Record   Primary Caregiver Self   Accompanied By/Relationship not applicable   Support Systems Parent   Patient's Healthcare Decision Maker is: Legal Next of [address] [place] [address]   PCP Verified by CM Yes  (Pt reports she is engaged with a group of physicians in [facility] but can't recall the name.)   Prior Functional Level Independent in ADLs/IADLs   Current Functional Level Assistance with the following:;Mobility   Can patient return to prior living arrangement Yes   Ability to make needs known: Good   Family able to assist with home care needs: Yes  (emergency contact as needed)   Would you like for me to discuss the discharge plan with any other family members/significant others, and if so, who?  Yes  (emergency contact as needed)   Financial Resources  Resources None   CM/SW Referral Other (see comment)  (discharge planning assessment)

## 2023-11-21 NOTE — CONSULTS
[facility] PHYSICAL THERAPY EVALUATION  Venkat Wilson, 1990, 2106/2106-A, 11/21/2023    Assessment:  Patient admitted for pneumothorax with evolving medical features and stable/uncomplicated rehabilitative features. Indep mobility upon eval today . Patient performed well with physical therapy evaluation and does not require ongoing acute care physical therapy service. Prognosis: good. Clinical decision making:  medium complexity:  hx 1-2 personal factors/comorbidities, exam tests and measures for 2-3 elements of body/structures/functions/activity limitation/participation restriction, evolving presentation  Discharge recommendations:  Recommend home with assist, including periodic support/supervision . Patient and mother plan for home. Plan/Goals:  Discharge from acute care physical therapy service. Mobility homework for patient and nurse:    change position frequently, out of bed for meals, stand at bedside 4-6 times daily , accompany to bathroom for voiding, and ambulate 4-6 times daily, with least restrictive device. Patient requires independent for mobility with nursing. History  [place]:  The primary encounter diagnosis was Pre-op testing. A diagnosis of Primary spontaneous pneumothorax was also pertinent to this visit. Patient  has a past medical history of GERD (gastroesophageal reflux disease), Liver failure ([address]), and Pneumothorax, spontaneous, tension. Patient  has a past surgical history that includes Endometrial ablation and chest tube insertion (Right, 11/2017). Therapy Hx and additional comorbidities:  see above. Restrictions:  none           Communication with other providers:  cleared for tx, discussed with nursing staff, and discussed with family    Subjective:  Patient states:  agreeable to eval.  Does not need ongoing PT service. Needs to use restroom, not yet OOB today. Pain:  minimal chest pain.   Patient goal:  home  Occupational profile

## 2023-11-22 ENCOUNTER — APPOINTMENT (OUTPATIENT)
Dept: GENERAL RADIOLOGY | Age: 33
DRG: 121 | End: 2023-11-22
Attending: THORACIC SURGERY (CARDIOTHORACIC VASCULAR SURGERY)
Payer: MEDICAID

## 2023-11-22 LAB
ANION GAP SERPL CALCULATED.3IONS-SCNC: 9 MMOL/L (ref 4–16)
BUN SERPL-MCNC: 9 MG/DL (ref 6–23)
CALCIUM SERPL-MCNC: 9.2 MG/DL (ref 8.3–10.6)
CHLORIDE BLD-SCNC: 101 MMOL/L (ref 99–110)
CO2: 26 MMOL/L (ref 21–32)
CREAT SERPL-MCNC: 1 MG/DL (ref 0.6–1.1)
GFR SERPL CREATININE-BSD FRML MDRD: >60 ML/MIN/1.73M2
GLUCOSE SERPL-MCNC: 82 MG/DL (ref 70–99)
HCT VFR BLD CALC: 36 % (ref 37–47)
HEMOGLOBIN: 12 GM/DL (ref 12.5–16)
MAGNESIUM: 2 MG/DL (ref 1.8–2.4)
MCH RBC QN AUTO: 31.5 PG (ref 27–31)
MCHC RBC AUTO-ENTMCNC: 33.3 % (ref 32–36)
MCV RBC AUTO: 94.5 FL (ref 78–100)
PDW BLD-RTO: 12.2 % (ref 11.7–14.9)
PHOSPHORUS: 3.7 MG/DL (ref 2.5–4.9)
PLATELET # BLD: 161 K/CU MM (ref 140–440)
PMV BLD AUTO: 10 FL (ref 7.5–11.1)
POTASSIUM SERPL-SCNC: 4.3 MMOL/L (ref 3.5–5.1)
RBC # BLD: 3.81 M/CU MM (ref 4.2–5.4)
SODIUM BLD-SCNC: 136 MMOL/L (ref 135–145)
WBC # BLD: 8.8 K/CU MM (ref 4–10.5)

## 2023-11-22 PROCEDURE — 94761 N-INVAS EAR/PLS OXIMETRY MLT: CPT

## 2023-11-22 PROCEDURE — 71046 X-RAY EXAM CHEST 2 VIEWS: CPT

## 2023-11-22 PROCEDURE — 6370000000 HC RX 637 (ALT 250 FOR IP): Performed by: PHYSICIAN ASSISTANT

## 2023-11-22 PROCEDURE — 83735 ASSAY OF MAGNESIUM: CPT

## 2023-11-22 PROCEDURE — 80048 BASIC METABOLIC PNL TOTAL CA: CPT

## 2023-11-22 PROCEDURE — 2000000000 HC ICU R&B

## 2023-11-22 PROCEDURE — 84100 ASSAY OF PHOSPHORUS: CPT

## 2023-11-22 PROCEDURE — 6360000002 HC RX W HCPCS: Performed by: PHYSICIAN ASSISTANT

## 2023-11-22 PROCEDURE — 2580000003 HC RX 258: Performed by: PHYSICIAN ASSISTANT

## 2023-11-22 PROCEDURE — 85027 COMPLETE CBC AUTOMATED: CPT

## 2023-11-22 RX ORDER — IPRATROPIUM BROMIDE AND ALBUTEROL SULFATE 2.5; .5 MG/3ML; MG/3ML
1 SOLUTION RESPIRATORY (INHALATION) 2 TIMES DAILY PRN
Status: DISCONTINUED | OUTPATIENT
Start: 2023-11-22 | End: 2023-11-23 | Stop reason: HOSPADM

## 2023-11-22 RX ADMIN — KETOROLAC TROMETHAMINE 15 MG: 30 INJECTION, SOLUTION INTRAMUSCULAR; INTRAVENOUS at 01:57

## 2023-11-22 RX ADMIN — CELECOXIB 200 MG: 200 CAPSULE ORAL at 22:07

## 2023-11-22 RX ADMIN — SODIUM CHLORIDE, PRESERVATIVE FREE 10 ML: 5 INJECTION INTRAVENOUS at 08:55

## 2023-11-22 RX ADMIN — CELECOXIB 200 MG: 200 CAPSULE ORAL at 12:16

## 2023-11-22 RX ADMIN — KETOROLAC TROMETHAMINE 15 MG: 30 INJECTION, SOLUTION INTRAMUSCULAR; INTRAVENOUS at 08:34

## 2023-11-22 RX ADMIN — FAMOTIDINE 20 MG: 20 TABLET ORAL at 21:26

## 2023-11-22 RX ADMIN — GABAPENTIN 300 MG: 300 CAPSULE ORAL at 08:33

## 2023-11-22 RX ADMIN — TRAMADOL HYDROCHLORIDE 50 MG: 50 TABLET, COATED ORAL at 05:12

## 2023-11-22 RX ADMIN — GABAPENTIN 300 MG: 300 CAPSULE ORAL at 21:26

## 2023-11-22 RX ADMIN — NITROFURANTOIN MONOHYDRATE/MACROCRYSTALS 100 MG: 75; 25 CAPSULE ORAL at 08:51

## 2023-11-22 RX ADMIN — ENOXAPARIN SODIUM 30 MG: 100 INJECTION SUBCUTANEOUS at 08:34

## 2023-11-22 RX ADMIN — ONDANSETRON 4 MG: 4 TABLET, ORALLY DISINTEGRATING ORAL at 05:12

## 2023-11-22 RX ADMIN — SODIUM CHLORIDE, PRESERVATIVE FREE 10 ML: 5 INJECTION INTRAVENOUS at 21:27

## 2023-11-22 RX ADMIN — NITROFURANTOIN MONOHYDRATE/MACROCRYSTALS 100 MG: 75; 25 CAPSULE ORAL at 22:08

## 2023-11-22 RX ADMIN — FAMOTIDINE 20 MG: 20 TABLET ORAL at 08:52

## 2023-11-22 RX ADMIN — GABAPENTIN 300 MG: 300 CAPSULE ORAL at 15:04

## 2023-11-22 ASSESSMENT — PAIN SCALES - GENERAL
PAINLEVEL_OUTOF10: 0
PAINLEVEL_OUTOF10: 2
PAINLEVEL_OUTOF10: 1
PAINLEVEL_OUTOF10: 0
PAINLEVEL_OUTOF10: 2
PAINLEVEL_OUTOF10: 0
PAINLEVEL_OUTOF10: 5
PAINLEVEL_OUTOF10: 2
PAINLEVEL_OUTOF10: 0
PAINLEVEL_OUTOF10: 2
PAINLEVEL_OUTOF10: 0

## 2023-11-22 ASSESSMENT — PAIN DESCRIPTION - LOCATION
LOCATION: CHEST
LOCATION: CHEST
LOCATION: SHOULDER
LOCATION: HEAD

## 2023-11-22 ASSESSMENT — PAIN DESCRIPTION - FREQUENCY
FREQUENCY: CONTINUOUS
FREQUENCY: INTERMITTENT

## 2023-11-22 ASSESSMENT — PAIN - FUNCTIONAL ASSESSMENT
PAIN_FUNCTIONAL_ASSESSMENT: ACTIVITIES ARE NOT PREVENTED
PAIN_FUNCTIONAL_ASSESSMENT: ACTIVITIES ARE NOT PREVENTED

## 2023-11-22 ASSESSMENT — PAIN DESCRIPTION - ORIENTATION
ORIENTATION: LEFT

## 2023-11-22 ASSESSMENT — PAIN DESCRIPTION - DESCRIPTORS
DESCRIPTORS: ACHING
DESCRIPTORS: DISCOMFORT;SHARP
DESCRIPTORS: ACHING
DESCRIPTORS: ACHING

## 2023-11-22 ASSESSMENT — PAIN DESCRIPTION - ONSET: ONSET: ON-GOING

## 2023-11-22 ASSESSMENT — PAIN DESCRIPTION - PAIN TYPE: TYPE: ACUTE PAIN

## 2023-11-22 NOTE — DISCHARGE INSTRUCTIONS
1. Call to schedule follow up hospital follow up appointment:     [address] at [address]   [address] [facility] [address], [address] [phone]   [address] [place] [address], [address] [place] [address]

## 2023-11-23 ENCOUNTER — APPOINTMENT (OUTPATIENT)
Dept: GENERAL RADIOLOGY | Age: 33
DRG: 121 | End: 2023-11-23
Attending: THORACIC SURGERY (CARDIOTHORACIC VASCULAR SURGERY)
Payer: MEDICAID

## 2023-11-23 VITALS
HEIGHT: 60 IN | BODY MASS INDEX: 22.12 KG/M2 | RESPIRATION RATE: 18 BRPM | OXYGEN SATURATION: 100 % | WEIGHT: 112.66 LBS | TEMPERATURE: 98 F | HEART RATE: 64 BPM | DIASTOLIC BLOOD PRESSURE: 93 MMHG | SYSTOLIC BLOOD PRESSURE: 151 MMHG

## 2023-11-23 LAB
ANION GAP SERPL CALCULATED.3IONS-SCNC: 7 MMOL/L (ref 4–16)
BUN SERPL-MCNC: 9 MG/DL (ref 6–23)
CALCIUM SERPL-MCNC: 9.1 MG/DL (ref 8.3–10.6)
CHLORIDE BLD-SCNC: 101 MMOL/L (ref 99–110)
CO2: 29 MMOL/L (ref 21–32)
CREAT SERPL-MCNC: 0.9 MG/DL (ref 0.6–1.1)
GFR SERPL CREATININE-BSD FRML MDRD: >60 ML/MIN/1.73M2
GLUCOSE SERPL-MCNC: 90 MG/DL (ref 70–99)
HCT VFR BLD CALC: 38.2 % (ref 37–47)
HEMOGLOBIN: 12.4 GM/DL (ref 12.5–16)
MAGNESIUM: 2 MG/DL (ref 1.8–2.4)
MCH RBC QN AUTO: 30.8 PG (ref 27–31)
MCHC RBC AUTO-ENTMCNC: 32.5 % (ref 32–36)
MCV RBC AUTO: 95 FL (ref 78–100)
PDW BLD-RTO: 11.8 % (ref 11.7–14.9)
PHOSPHORUS: 4 MG/DL (ref 2.5–4.9)
PLATELET # BLD: 182 K/CU MM (ref 140–440)
PMV BLD AUTO: 10.1 FL (ref 7.5–11.1)
POTASSIUM SERPL-SCNC: 4.3 MMOL/L (ref 3.5–5.1)
RBC # BLD: 4.02 M/CU MM (ref 4.2–5.4)
SODIUM BLD-SCNC: 137 MMOL/L (ref 135–145)
WBC # BLD: 6.6 K/CU MM (ref 4–10.5)

## 2023-11-23 PROCEDURE — 71046 X-RAY EXAM CHEST 2 VIEWS: CPT

## 2023-11-23 PROCEDURE — 6370000000 HC RX 637 (ALT 250 FOR IP): Performed by: PHYSICIAN ASSISTANT

## 2023-11-23 PROCEDURE — 85027 COMPLETE CBC AUTOMATED: CPT

## 2023-11-23 PROCEDURE — 2580000003 HC RX 258: Performed by: PHYSICIAN ASSISTANT

## 2023-11-23 PROCEDURE — 6360000002 HC RX W HCPCS: Performed by: PHYSICIAN ASSISTANT

## 2023-11-23 PROCEDURE — 80048 BASIC METABOLIC PNL TOTAL CA: CPT

## 2023-11-23 PROCEDURE — 84100 ASSAY OF PHOSPHORUS: CPT

## 2023-11-23 PROCEDURE — 71045 X-RAY EXAM CHEST 1 VIEW: CPT

## 2023-11-23 PROCEDURE — 94761 N-INVAS EAR/PLS OXIMETRY MLT: CPT

## 2023-11-23 PROCEDURE — 37799 UNLISTED PX VASCULAR SURGERY: CPT

## 2023-11-23 PROCEDURE — 83735 ASSAY OF MAGNESIUM: CPT

## 2023-11-23 RX ORDER — CELECOXIB 200 MG/1
200 CAPSULE ORAL 2 TIMES DAILY
Qty: 30 CAPSULE | Refills: 0 | Status: SHIPPED | OUTPATIENT
Start: 2023-11-23 | End: 2023-12-08

## 2023-11-23 RX ORDER — TRAMADOL HYDROCHLORIDE 50 MG/1
50 TABLET ORAL EVERY 6 HOURS PRN
Qty: 28 TABLET | Refills: 0 | Status: SHIPPED | OUTPATIENT
Start: 2023-11-23 | End: 2023-11-23 | Stop reason: SDUPTHER

## 2023-11-23 RX ORDER — NITROFURANTOIN 25; 75 MG/1; MG/1
100 CAPSULE ORAL EVERY 12 HOURS SCHEDULED
Qty: 6 CAPSULE | Refills: 0 | Status: SHIPPED | OUTPATIENT
Start: 2023-11-23 | End: 2023-11-26

## 2023-11-23 RX ORDER — TRAMADOL HYDROCHLORIDE 50 MG/1
50 TABLET ORAL EVERY 6 HOURS PRN
Qty: 28 TABLET | Refills: 0 | Status: SHIPPED | OUTPATIENT
Start: 2023-11-23 | End: 2023-11-30

## 2023-11-23 RX ORDER — GINSENG 100 MG
CAPSULE ORAL
Qty: 14 G | Refills: 0 | Status: SHIPPED | OUTPATIENT
Start: 2023-11-24 | End: 2023-12-03

## 2023-11-23 RX ORDER — GABAPENTIN 300 MG/1
300 CAPSULE ORAL 3 TIMES DAILY
Qty: 30 CAPSULE | Refills: 0 | Status: SHIPPED | OUTPATIENT
Start: 2023-11-23 | End: 2023-12-03

## 2023-11-23 RX ADMIN — NITROFURANTOIN MONOHYDRATE/MACROCRYSTALS 100 MG: 75; 25 CAPSULE ORAL at 08:41

## 2023-11-23 RX ADMIN — SODIUM CHLORIDE, PRESERVATIVE FREE 10 ML: 5 INJECTION INTRAVENOUS at 08:39

## 2023-11-23 RX ADMIN — ENOXAPARIN SODIUM 30 MG: 100 INJECTION SUBCUTANEOUS at 08:37

## 2023-11-23 RX ADMIN — TRAMADOL HYDROCHLORIDE 50 MG: 50 TABLET, COATED ORAL at 08:37

## 2023-11-23 RX ADMIN — FAMOTIDINE 20 MG: 20 TABLET ORAL at 08:37

## 2023-11-23 RX ADMIN — ONDANSETRON 4 MG: 4 TABLET, ORALLY DISINTEGRATING ORAL at 08:37

## 2023-11-23 RX ADMIN — CELECOXIB 200 MG: 200 CAPSULE ORAL at 08:41

## 2023-11-23 RX ADMIN — GABAPENTIN 300 MG: 300 CAPSULE ORAL at 08:37

## 2023-11-23 ASSESSMENT — PAIN DESCRIPTION - LOCATION
LOCATION: INCISION
LOCATION: HEAD

## 2023-11-23 ASSESSMENT — PAIN DESCRIPTION - DESCRIPTORS
DESCRIPTORS: ACHING
DESCRIPTORS: ACHING

## 2023-11-23 ASSESSMENT — PAIN SCALES - GENERAL
PAINLEVEL_OUTOF10: 0
PAINLEVEL_OUTOF10: 4

## 2023-11-23 NOTE — DISCHARGE SUMMARY
Cardiothoracic [address] [place] [address]    Discharge Summary     Patient ID: Deniz Streeter  :  1990   MRN: [id number]     ACCOUNT:  [de-identified]   Patient's PCP: No primary care provider on file. Admit Date: 2023   Discharge Date: 2023   Length of Stay: 3  Code Status:  Full Code  Admitting Physician: Zoila Reeves MD  Discharge Physician: Jerona Harada, PA     Active Discharge Diagnoses:     Hospital Problem Lists:  Principal Problem:    Pneumothorax  Resolved Problems:    * No resolved hospital problems. *      Admission Condition:  good     Discharged Condition: good    Hospital Stay:     HPI: Deniz Streeter is a 34 y.o. female with PMH of Spontaneous pneumothorax. Patient [facility] ED on 23 with a spontaneous pnuemothorax. In 2017 was on the right side. She states she quit smoking in 2019. She does continue to use mariajuana. She states she continues to be SOB and has discomfort along thevleft chest wall. She was given all risks and benefits to left VATS with pleurodesis. The patient and step mother asked multiple question and all were answered. She was advised to stop smoking marijuana prior to surgery and all piercings should be removed prior to day of surgery      Hospital Course:  Post-op course was unremarkable and the patient will be discharged home today in stable condition. CT site was reinforced with vaseline gauze, sterile 4x4's and covered with ABD pads and tape. Dressing was CDI upon discharge home. Patient was instructed not to remove the dressing until Saturday. Patient was given sterile 4x4's, ABD pads as well as tape to redress the site after Saturday if need be. Procedure: 23    Left VATS  2. Pulmonary wedge resection x 1   3. Parietal pleurectomy  4. Doxycycline pleurodesis  5. Mechanical pleurodesis    Final Pathologic Diagnosis:   A.  Left lung upper lobe, wedge

## 2023-11-23 NOTE — PLAN OF CARE
Problem: Discharge Planning  Goal: Discharge to home or other facility with appropriate resources  Outcome: Progressing  Flowsheets (Taken 11/22/2023 1600 by Татьяна Freeman RN)  Discharge to home or other facility with appropriate resources:   Identify discharge learning needs (meds, wound care, etc)   Identify barriers to discharge with patient and caregiver     Problem: Pain  Goal: Verbalizes/displays adequate comfort level or baseline comfort level  Outcome: Progressing     Problem: ABCDS Injury Assessment  Goal: Absence of physical injury  Outcome: Progressing

## 2023-11-24 NOTE — ANESTHESIA POSTPROCEDURE EVALUATION
Department of Anesthesiology  Postprocedure Note    Patient: Suhail Magana  MRN: [id number]  YOB: 1990  Date of evaluation: 11/24/2023      Procedure Summary     Date: 11/20/23 Room / Location: [address] [place] [address]    Anesthesia Start: 1116 Anesthesia Stop: 1078    Procedure: LEFT VATS THORACOSCOPY; LEFT UPPER LOBE WEDGE RESECTION; MECHANICAL AND CHEMICAL PLEURODESIS; PLEURECTOMY (Left) Diagnosis:       Primary spontaneous pneumothorax      (Primary spontaneous pneumothorax [J93.11])    Surgeons: Jasbir Fields MD Responsible Provider: Froylan Calixto MD    Anesthesia Type: general ASA Status: 4          Anesthesia Type: No value filed.     Claude Phase I: Claude Score: 9    Claude Phase II:        Anesthesia Post Evaluation    Patient location during evaluation: PACU  Patient participation: complete - patient participated  Level of consciousness: sleepy but conscious  Pain score: 1  Airway patency: patent  Nausea & Vomiting: no nausea and no vomiting  Complications: no  Cardiovascular status: hemodynamically stable  Respiratory status: acceptable  Hydration status: euvolemic  Pain management: adequate

## 2023-11-27 ENCOUNTER — HOSPITAL ENCOUNTER (OUTPATIENT)
Age: 33
Discharge: HOME | End: 2023-11-27
Payer: MEDICAID

## 2023-11-27 ENCOUNTER — HOSPITAL ENCOUNTER (OUTPATIENT)
Dept: GENERAL RADIOLOGY | Age: 33
Discharge: HOME | End: 2023-11-27
Payer: MEDICAID

## 2023-11-27 ENCOUNTER — TELEPHONE (OUTPATIENT)
Dept: CARDIOTHORACIC SURGERY | Age: 33
End: 2023-11-27

## 2023-11-27 DIAGNOSIS — J93.11 PRIMARY SPONTANEOUS PNEUMOTHORAX: ICD-10-CM

## 2023-11-27 PROCEDURE — 71046 X-RAY EXAM CHEST 2 VIEWS: CPT

## 2023-11-28 ENCOUNTER — OFFICE VISIT (OUTPATIENT)
Dept: CARDIOTHORACIC SURGERY | Age: 33
End: 2023-11-28

## 2023-11-28 VITALS
BODY MASS INDEX: 20.69 KG/M2 | OXYGEN SATURATION: 99 % | DIASTOLIC BLOOD PRESSURE: 66 MMHG | SYSTOLIC BLOOD PRESSURE: 108 MMHG | HEIGHT: 60 IN | WEIGHT: 105.4 LBS | HEART RATE: 65 BPM

## 2023-11-28 DIAGNOSIS — Z48.89 ENCOUNTER FOR POST SURGICAL WOUND CHECK: Primary | ICD-10-CM

## 2023-11-28 PROCEDURE — 99024 POSTOP FOLLOW-UP VISIT: CPT | Performed by: NURSE PRACTITIONER

## 2023-12-06 ENCOUNTER — TELEPHONE (OUTPATIENT)
Dept: CARDIOTHORACIC SURGERY | Age: 33
End: 2023-12-06

## 2023-12-06 NOTE — TELEPHONE ENCOUNTER
LM for  with [place] Risk adviser to return call with new fax number or email to send pt's paperwork.

## 2023-12-06 NOTE — TELEPHONE ENCOUNTER
Attempted to call pt to notify both fax numbers provided to send work release paperwork not working, both numbers attempted multiple times. Patients phone \"unable to receive calls at this time\".

## 2023-12-07 ENCOUNTER — HOSPITAL ENCOUNTER (OUTPATIENT)
Dept: GENERAL RADIOLOGY | Age: 33
Discharge: HOME | End: 2023-12-07
Payer: MEDICAID

## 2023-12-07 ENCOUNTER — HOSPITAL ENCOUNTER (OUTPATIENT)
Age: 33
Discharge: HOME | End: 2023-12-07
Payer: MEDICAID

## 2023-12-07 DIAGNOSIS — J93.11 PRIMARY SPONTANEOUS PNEUMOTHORAX: ICD-10-CM

## 2023-12-07 PROCEDURE — 71046 X-RAY EXAM CHEST 2 VIEWS: CPT

## 2024-08-18 ENCOUNTER — HOSPITAL ENCOUNTER (INPATIENT)
Age: 34
LOS: 6 days | Discharge: HOME | End: 2024-08-24
Attending: STUDENT IN AN ORGANIZED HEALTH CARE EDUCATION/TRAINING PROGRAM | Admitting: STUDENT IN AN ORGANIZED HEALTH CARE EDUCATION/TRAINING PROGRAM
Payer: MEDICAID

## 2024-08-18 ENCOUNTER — APPOINTMENT (OUTPATIENT)
Dept: GENERAL RADIOLOGY | Age: 34
End: 2024-08-18
Attending: EMERGENCY MEDICINE
Payer: MEDICAID

## 2024-08-18 ENCOUNTER — HOSPITAL ENCOUNTER (EMERGENCY)
Age: 34
Discharge: ANOTHER HOSPITAL | End: 2024-08-18
Attending: EMERGENCY MEDICINE
Payer: MEDICAID

## 2024-08-18 VITALS
HEIGHT: 60 IN | BODY MASS INDEX: 21.68 KG/M2 | OXYGEN SATURATION: 100 % | DIASTOLIC BLOOD PRESSURE: 89 MMHG | RESPIRATION RATE: 19 BRPM | HEART RATE: 52 BPM | WEIGHT: 110.4 LBS | SYSTOLIC BLOOD PRESSURE: 144 MMHG | TEMPERATURE: 98.2 F

## 2024-08-18 DIAGNOSIS — J93.9 RECURRENT PNEUMOTHORAX: ICD-10-CM

## 2024-08-18 DIAGNOSIS — J93.83 SPONTANEOUS PNEUMOTHORAX: Primary | ICD-10-CM

## 2024-08-18 DIAGNOSIS — J93.9 PNEUMOTHORAX ON RIGHT: Primary | ICD-10-CM

## 2024-08-18 LAB
ALBUMIN SERPL-MCNC: 3.7 GM/DL (ref 3.4–5)
ALP BLD-CCNC: 52 IU/L (ref 40–129)
ALT SERPL-CCNC: <5 U/L (ref 10–40)
ANION GAP SERPL CALCULATED.3IONS-SCNC: 12 MMOL/L (ref 7–16)
AST SERPL-CCNC: 10 IU/L (ref 15–37)
BASOPHILS ABSOLUTE: 0 K/CU MM
BASOPHILS RELATIVE PERCENT: 0.4 % (ref 0–1)
BILIRUB SERPL-MCNC: 0.3 MG/DL (ref 0–1)
BUN SERPL-MCNC: 9 MG/DL (ref 6–23)
CALCIUM SERPL-MCNC: 8.7 MG/DL (ref 8.3–10.6)
CHLORIDE BLD-SCNC: 103 MMOL/L (ref 99–110)
CO2: 23 MMOL/L (ref 21–32)
CREAT SERPL-MCNC: 0.7 MG/DL (ref 0.6–1.1)
DIFFERENTIAL TYPE: ABNORMAL
EOSINOPHILS ABSOLUTE: 0.1 K/CU MM
EOSINOPHILS RELATIVE PERCENT: 0.9 % (ref 0–3)
GFR, ESTIMATED: >90 ML/MIN/1.73M2
GLUCOSE SERPL-MCNC: 77 MG/DL (ref 70–99)
HCT VFR BLD CALC: 39.5 % (ref 37–47)
HEMOGLOBIN: 13.4 GM/DL (ref 12.5–16)
IMMATURE NEUTROPHIL %: 0.3 % (ref 0–0.43)
LYMPHOCYTES ABSOLUTE: 1.4 K/CU MM
LYMPHOCYTES RELATIVE PERCENT: 20.1 % (ref 24–44)
MAGNESIUM: 2.1 MG/DL (ref 1.8–2.4)
MCH RBC QN AUTO: 31.2 PG (ref 27–31)
MCHC RBC AUTO-ENTMCNC: 33.9 % (ref 32–36)
MCV RBC AUTO: 91.9 FL (ref 78–100)
MONOCYTES ABSOLUTE: 0.4 K/CU MM
MONOCYTES RELATIVE PERCENT: 5.4 % (ref 0–4)
NEUTROPHILS ABSOLUTE: 5.1 K/CU MM
NEUTROPHILS RELATIVE PERCENT: 72.9 % (ref 36–66)
PDW BLD-RTO: 11.8 % (ref 11.7–14.9)
PLATELET # BLD: 239 K/CU MM (ref 140–440)
PMV BLD AUTO: 10.1 FL (ref 7.5–11.1)
POTASSIUM SERPL-SCNC: 4 MMOL/L (ref 3.5–5.1)
RBC # BLD: 4.3 M/CU MM (ref 4.2–5.4)
SODIUM BLD-SCNC: 138 MMOL/L (ref 135–145)
TOTAL IMMATURE NEUTOROPHIL: 0.02 K/CU MM
TOTAL PROTEIN: 6.3 GM/DL (ref 6.4–8.2)
WBC # BLD: 7 K/CU MM (ref 4–10.5)

## 2024-08-18 PROCEDURE — 2580000003 HC RX 258: Performed by: STUDENT IN AN ORGANIZED HEALTH CARE EDUCATION/TRAINING PROGRAM

## 2024-08-18 PROCEDURE — 6370000000 HC RX 637 (ALT 250 FOR IP)

## 2024-08-18 PROCEDURE — 83735 ASSAY OF MAGNESIUM: CPT

## 2024-08-18 PROCEDURE — 32551 INSERTION OF CHEST TUBE: CPT

## 2024-08-18 PROCEDURE — 6360000002 HC RX W HCPCS: Performed by: EMERGENCY MEDICINE

## 2024-08-18 PROCEDURE — 80053 COMPREHEN METABOLIC PANEL: CPT

## 2024-08-18 PROCEDURE — 96376 TX/PRO/DX INJ SAME DRUG ADON: CPT

## 2024-08-18 PROCEDURE — 85025 COMPLETE CBC W/AUTO DIFF WBC: CPT

## 2024-08-18 PROCEDURE — 99285 EMERGENCY DEPT VISIT HI MDM: CPT

## 2024-08-18 PROCEDURE — 71046 X-RAY EXAM CHEST 2 VIEWS: CPT

## 2024-08-18 PROCEDURE — 71045 X-RAY EXAM CHEST 1 VIEW: CPT

## 2024-08-18 PROCEDURE — 2140000000 HC CCU INTERMEDIATE R&B

## 2024-08-18 PROCEDURE — 96374 THER/PROPH/DIAG INJ IV PUSH: CPT

## 2024-08-18 RX ORDER — SODIUM CHLORIDE 9 MG/ML
INJECTION, SOLUTION INTRAVENOUS PRN
Status: DISCONTINUED | OUTPATIENT
Start: 2024-08-18 | End: 2024-08-21

## 2024-08-18 RX ORDER — ACETAMINOPHEN 650 MG/1
650 SUPPOSITORY RECTAL EVERY 6 HOURS PRN
Status: DISCONTINUED | OUTPATIENT
Start: 2024-08-18 | End: 2024-08-21

## 2024-08-18 RX ORDER — SODIUM CHLORIDE 0.9 % (FLUSH) 0.9 %
5-40 SYRINGE (ML) INJECTION EVERY 12 HOURS SCHEDULED
Status: DISCONTINUED | OUTPATIENT
Start: 2024-08-18 | End: 2024-08-21

## 2024-08-18 RX ORDER — POLYETHYLENE GLYCOL 3350 17 G/17G
17 POWDER, FOR SOLUTION ORAL DAILY PRN
Status: DISCONTINUED | OUTPATIENT
Start: 2024-08-18 | End: 2024-08-21

## 2024-08-18 RX ORDER — OXYCODONE HYDROCHLORIDE 5 MG/1
5 TABLET ORAL ONCE
Status: COMPLETED | OUTPATIENT
Start: 2024-08-18 | End: 2024-08-18

## 2024-08-18 RX ORDER — MORPHINE SULFATE 4 MG/ML
4 INJECTION, SOLUTION INTRAMUSCULAR; INTRAVENOUS EVERY 30 MIN PRN
Status: DISCONTINUED | OUTPATIENT
Start: 2024-08-18 | End: 2024-08-18

## 2024-08-18 RX ORDER — MAGNESIUM SULFATE IN WATER 40 MG/ML
2000 INJECTION, SOLUTION INTRAVENOUS PRN
Status: DISCONTINUED | OUTPATIENT
Start: 2024-08-18 | End: 2024-08-21

## 2024-08-18 RX ORDER — POTASSIUM CHLORIDE 7.45 MG/ML
10 INJECTION INTRAVENOUS PRN
Status: DISCONTINUED | OUTPATIENT
Start: 2024-08-18 | End: 2024-08-21

## 2024-08-18 RX ORDER — FAMOTIDINE 20 MG/1
20 TABLET, FILM COATED ORAL 2 TIMES DAILY
Status: DISCONTINUED | OUTPATIENT
Start: 2024-08-18 | End: 2024-08-21

## 2024-08-18 RX ORDER — LANOLIN ALCOHOL/MO/W.PET/CERES
3 CREAM (GRAM) TOPICAL NIGHTLY PRN
Status: DISCONTINUED | OUTPATIENT
Start: 2024-08-18 | End: 2024-08-24 | Stop reason: HOSPADM

## 2024-08-18 RX ORDER — NICOTINE 21 MG/24HR
1 PATCH, TRANSDERMAL 24 HOURS TRANSDERMAL DAILY PRN
Status: DISCONTINUED | OUTPATIENT
Start: 2024-08-18 | End: 2024-08-24 | Stop reason: HOSPADM

## 2024-08-18 RX ORDER — SODIUM CHLORIDE 0.9 % (FLUSH) 0.9 %
5-40 SYRINGE (ML) INJECTION PRN
Status: DISCONTINUED | OUTPATIENT
Start: 2024-08-18 | End: 2024-08-21

## 2024-08-18 RX ORDER — ONDANSETRON 4 MG/1
4 TABLET, ORALLY DISINTEGRATING ORAL EVERY 8 HOURS PRN
Status: DISCONTINUED | OUTPATIENT
Start: 2024-08-18 | End: 2024-08-21

## 2024-08-18 RX ORDER — SODIUM CHLORIDE, SODIUM LACTATE, POTASSIUM CHLORIDE, CALCIUM CHLORIDE 600; 310; 30; 20 MG/100ML; MG/100ML; MG/100ML; MG/100ML
INJECTION, SOLUTION INTRAVENOUS CONTINUOUS
Status: DISPENSED | OUTPATIENT
Start: 2024-08-18 | End: 2024-08-19

## 2024-08-18 RX ORDER — ONDANSETRON 2 MG/ML
4 INJECTION INTRAMUSCULAR; INTRAVENOUS EVERY 30 MIN PRN
Status: DISCONTINUED | OUTPATIENT
Start: 2024-08-18 | End: 2024-08-18 | Stop reason: HOSPADM

## 2024-08-18 RX ORDER — ACETAMINOPHEN 325 MG/1
650 TABLET ORAL EVERY 6 HOURS PRN
Status: DISCONTINUED | OUTPATIENT
Start: 2024-08-18 | End: 2024-08-21

## 2024-08-18 RX ORDER — ONDANSETRON 2 MG/ML
4 INJECTION INTRAMUSCULAR; INTRAVENOUS EVERY 6 HOURS PRN
Status: DISCONTINUED | OUTPATIENT
Start: 2024-08-18 | End: 2024-08-21

## 2024-08-18 RX ORDER — MORPHINE SULFATE 2 MG/ML
2 INJECTION, SOLUTION INTRAMUSCULAR; INTRAVENOUS EVERY 30 MIN PRN
Status: DISCONTINUED | OUTPATIENT
Start: 2024-08-18 | End: 2024-08-18 | Stop reason: HOSPADM

## 2024-08-18 RX ORDER — ENOXAPARIN SODIUM 100 MG/ML
40 INJECTION SUBCUTANEOUS EVERY EVENING
Status: DISCONTINUED | OUTPATIENT
Start: 2024-08-19 | End: 2024-08-20

## 2024-08-18 RX ADMIN — OXYCODONE HYDROCHLORIDE 5 MG: 5 TABLET ORAL at 23:47

## 2024-08-18 RX ADMIN — MORPHINE SULFATE 2 MG: 4 INJECTION, SOLUTION INTRAMUSCULAR; INTRAVENOUS at 16:22

## 2024-08-18 RX ADMIN — MORPHINE SULFATE 2 MG: 2 INJECTION, SOLUTION INTRAMUSCULAR; INTRAVENOUS at 16:38

## 2024-08-18 RX ADMIN — SODIUM CHLORIDE, POTASSIUM CHLORIDE, SODIUM LACTATE AND CALCIUM CHLORIDE: 600; 310; 30; 20 INJECTION, SOLUTION INTRAVENOUS at 22:46

## 2024-08-18 RX ADMIN — MORPHINE SULFATE 2 MG: 4 INJECTION, SOLUTION INTRAMUSCULAR; INTRAVENOUS at 16:03

## 2024-08-18 ASSESSMENT — PAIN DESCRIPTION - ORIENTATION
ORIENTATION: RIGHT

## 2024-08-18 ASSESSMENT — PAIN DESCRIPTION - DESCRIPTORS
DESCRIPTORS: OTHER (COMMENT)
DESCRIPTORS: SHARP
DESCRIPTORS: ACHING;SORE
DESCRIPTORS: SORE;ACHING
DESCRIPTORS: PRESSURE

## 2024-08-18 ASSESSMENT — PAIN SCALES - GENERAL
PAINLEVEL_OUTOF10: 5
PAINLEVEL_OUTOF10: 10
PAINLEVEL_OUTOF10: 10
PAINLEVEL_OUTOF10: 2
PAINLEVEL_OUTOF10: 7
PAINLEVEL_OUTOF10: 7

## 2024-08-18 ASSESSMENT — PAIN DESCRIPTION - FREQUENCY
FREQUENCY: CONTINUOUS

## 2024-08-18 ASSESSMENT — PAIN - FUNCTIONAL ASSESSMENT
PAIN_FUNCTIONAL_ASSESSMENT: PREVENTS OR INTERFERES SOME ACTIVE ACTIVITIES AND ADLS
PAIN_FUNCTIONAL_ASSESSMENT: 0-10
PAIN_FUNCTIONAL_ASSESSMENT: PREVENTS OR INTERFERES SOME ACTIVE ACTIVITIES AND ADLS
PAIN_FUNCTIONAL_ASSESSMENT: PREVENTS OR INTERFERES SOME ACTIVE ACTIVITIES AND ADLS

## 2024-08-18 ASSESSMENT — PAIN DESCRIPTION - PAIN TYPE
TYPE: ACUTE PAIN

## 2024-08-18 ASSESSMENT — PAIN DESCRIPTION - LOCATION
LOCATION: CHEST
LOCATION: RIB CAGE
LOCATION: CHEST

## 2024-08-18 ASSESSMENT — PAIN DESCRIPTION - ONSET
ONSET: GRADUAL
ONSET: ON-GOING
ONSET: ON-GOING

## 2024-08-18 NOTE — ED PROVIDER NOTES
Emergency Department Encounter  Location: [facility] EMERGENCY DEPARTMENT    Patient: Clarisse Arriaga  MRN: [id number]  : 1990  Date of evaluation: 2024  ED Provider: Gale Kolb DO    Chief Complaint:    Rib Pain (injury) (Ride side has history of lung collapsing, states feels the same as previous.)    [place]:  Clarisse Arriaga is a 34 y.o. female that presents to the emergency department with concern for right sided pain in her chest.  Patient states that she has a history of recurrent pneumothoraces.  She had pain in her right chest a couple of days ago.  It seemed to resolve.  Today she was sitting at home when she had abrupt onset of right sided chest pain.  She states it feels exactly like prior pneumothorax.  She admits she feels a little short of breath.  Denies any trauma or unusual activity.  No recent fever, chills or cough      Past Medical History:   Diagnosis Date    GERD (gastroesophageal reflux disease)     Hepatitis B     stated per patient    Liver failure (HCC)     Pneumothorax, spontaneous, tension     right side, 2017 with chest tube     Past Surgical History:   Procedure Laterality Date    CHEST TUBE INSERTION Right 2017    spontaneous pneumo    ENDOMETRIAL ABLATION      THORACOSCOPY Left 2023    LEFT VATS THORACOSCOPY; LEFT UPPER LOBE WEDGE RESECTION; MECHANICAL AND CHEMICAL PLEURODESIS; PLEURECTOMY performed by Timbo Razo MD at [facility] OR    THORACOSCOPY Right 2024    RIGHT VATS THORACOSCOPY WITH BLEB RESECTION WITH TALC PLEURODESIS performed by George Vigil MD at [facility] OR     Family History   Problem Relation Age of Onset    No Known Problems Mother     No Known Problems Father      Social History     Socioeconomic History    Marital status: Single     Spouse name: Not on file    Number of children: Not on file    Years of education: Not on file    Highest education level: Not on file   Occupational History    Not on file   Tobacco  4 %    Eosinophils % 0.9 0 - 3 %    Basophils % 0.4 0 - 1 %    Neutrophils Absolute 5.1 K/CU MM    Lymphocytes Absolute 1.4 K/CU MM    Monocytes Absolute 0.4 K/CU MM    Eosinophils Absolute 0.1 K/CU MM    Basophils Absolute 0.0 K/CU MM    Immature Neutrophil % 0.3 0 - 0.43 %    Total Immature Neutrophil 0.02 K/CU MM   Comprehensive Metabolic Panel   Result Value Ref Range    Sodium 138 135 - 145 MMOL/L    Potassium 4.0 3.5 - 5.1 MMOL/L    Chloride 103 99 - 110 mMol/L    CO2 23 21 - 32 MMOL/L    Anion Gap 12 7 - 16    Glucose 77 70 - 99 MG/DL    BUN 9 6 - 23 MG/DL    Creatinine 0.7 0.6 - 1.1 MG/DL    Est, Glom Filt Rate >90 >60 mL/min/1.73m2    Calcium 8.7 8.3 - 10.6 MG/DL    Total Protein 6.3 (L) 6.4 - 8.2 GM/DL    Albumin 3.7 3.4 - 5.0 GM/DL    Total Bilirubin 0.3 0.0 - 1.0 MG/DL    Alkaline Phosphatase 52 40 - 129 IU/L    ALT <5 (L) 10 - 40 U/L    AST 10 (L) 15 - 37 IU/L   Magnesium   Result Value Ref Range    Magnesium 2.1 1.8 - 2.4 mg/dl       Radiographs (if obtained):  [] The following radiograph was interpreted by myself in the absence of a radiologist:  [x] Radiologist's Report reviewed at time of ED visit:  XR CHEST PORTABLE    Result Date: 8/18/2024  EXAMINATION:  CHEST RADIOGRAPH (PORTABLE SINGLE VIEW AP) Exam Date/Time:  8/18/2024 4:32 PM Clinical History:  chest pain Comparison: 8/18/2024  RESULT: Lines, tubes, and devices: Catheter overlying the right chest wall. Lungs and pleura:  No focal consolidation. No pneumothorax. No pleural effusion. Cardiomediastinal silhouette:  Stable cardiomediastinal silhouette.  Other: No acute osseous process.      No acute cardiopulmonary abnormality. Electronically signed by Kendell Fountain    XR CHEST (2 VW)    Result Date: 8/18/2024  Chest X-ray INDICATION: Chest pain COMPARISON: 12/7/2023 TECHNIQUE: PA and lateral views of the chest were obtained. FINDINGS: The lungs are clear. There are no infiltrates or effusions. There is a large right-sided pneumothorax with

## 2024-08-18 NOTE — ED NOTES
Pt cleaned and draped. Dr Kolb gowned and sterile. Pt placed with right arm up over head.   1613 pt numbed per Dr Kolb  1615 incision made, chest tube placed 8 cm 16 cm   1616 chest tube connected to pleura vac with bubbling to chamber with blue liquid  1620 suturing completed and dressing applied by Dr Kolb.

## 2024-08-18 NOTE — ED NOTES
Called to give report to [facility] [address], spoke with Willow charge nurse who states Drea will be getting pt at ext [phone] but unable to answer phone at this time. Will call back to attempt report again.

## 2024-08-19 ENCOUNTER — APPOINTMENT (OUTPATIENT)
Dept: CT IMAGING | Age: 34
End: 2024-08-19
Attending: STUDENT IN AN ORGANIZED HEALTH CARE EDUCATION/TRAINING PROGRAM
Payer: MEDICAID

## 2024-08-19 ENCOUNTER — APPOINTMENT (OUTPATIENT)
Dept: GENERAL RADIOLOGY | Age: 34
End: 2024-08-19
Attending: STUDENT IN AN ORGANIZED HEALTH CARE EDUCATION/TRAINING PROGRAM
Payer: MEDICAID

## 2024-08-19 LAB
ALBUMIN SERPL-MCNC: 3.6 GM/DL (ref 3.4–5)
ALP BLD-CCNC: 46 IU/L (ref 40–128)
ALT SERPL-CCNC: 5 U/L (ref 10–40)
AMPHETAMINES: NEGATIVE
ANION GAP SERPL CALCULATED.3IONS-SCNC: 10 MMOL/L (ref 7–16)
AST SERPL-CCNC: 10 IU/L (ref 15–37)
BARBITURATE SCREEN URINE: NEGATIVE
BASOPHILS ABSOLUTE: 0 K/CU MM
BASOPHILS RELATIVE PERCENT: 0.3 % (ref 0–1)
BENZODIAZEPINE SCREEN, URINE: NEGATIVE
BILIRUB SERPL-MCNC: 0.5 MG/DL (ref 0–1)
BUN SERPL-MCNC: 7 MG/DL (ref 6–23)
CALCIUM SERPL-MCNC: 8.7 MG/DL (ref 8.3–10.6)
CANNABINOID SCREEN URINE: ABNORMAL
CHLORIDE BLD-SCNC: 101 MMOL/L (ref 99–110)
CO2: 25 MMOL/L (ref 21–32)
COCAINE METABOLITE: NEGATIVE
CREAT SERPL-MCNC: 0.8 MG/DL (ref 0.6–1.1)
DIFFERENTIAL TYPE: ABNORMAL
EKG ATRIAL RATE: 49 BPM
EKG DIAGNOSIS: NORMAL
EKG P AXIS: 55 DEGREES
EKG P-R INTERVAL: 114 MS
EKG Q-T INTERVAL: 474 MS
EKG QRS DURATION: 90 MS
EKG QTC CALCULATION (BAZETT): 428 MS
EKG R AXIS: 78 DEGREES
EKG T AXIS: 73 DEGREES
EKG VENTRICULAR RATE: 49 BPM
EOSINOPHILS ABSOLUTE: 0 K/CU MM
EOSINOPHILS RELATIVE PERCENT: 0.5 % (ref 0–3)
FENTANYL URINE: NEGATIVE
GFR, ESTIMATED: >90 ML/MIN/1.73M2
GLUCOSE SERPL-MCNC: 88 MG/DL (ref 70–99)
HCT VFR BLD CALC: 34.7 % (ref 37–47)
HEMOGLOBIN: 12.2 GM/DL (ref 12.5–16)
IMMATURE NEUTROPHIL %: 0.3 % (ref 0–0.43)
INR BLD: 1.1 INDEX
LYMPHOCYTES ABSOLUTE: 2 K/CU MM
LYMPHOCYTES RELATIVE PERCENT: 26.5 % (ref 24–44)
MCH RBC QN AUTO: 32 PG (ref 27–31)
MCHC RBC AUTO-ENTMCNC: 35.2 % (ref 32–36)
MCV RBC AUTO: 91.1 FL (ref 78–100)
MONOCYTES ABSOLUTE: 0.6 K/CU MM
MONOCYTES RELATIVE PERCENT: 7.7 % (ref 0–4)
NEUTROPHILS ABSOLUTE: 4.8 K/CU MM
NEUTROPHILS RELATIVE PERCENT: 64.7 % (ref 36–66)
NUCLEATED RBC %: 0 %
OPIATES, URINE: ABNORMAL
OXYCODONE: ABNORMAL
PDW BLD-RTO: 11.7 % (ref 11.7–14.9)
PLATELET # BLD: 202 K/CU MM (ref 140–440)
PMV BLD AUTO: 9.9 FL (ref 7.5–11.1)
POTASSIUM SERPL-SCNC: 4.1 MMOL/L (ref 3.5–5.1)
PROTHROMBIN TIME: 14.5 SECONDS (ref 11.7–14.5)
RBC # BLD: 3.81 M/CU MM (ref 4.2–5.4)
SODIUM BLD-SCNC: 136 MMOL/L (ref 135–145)
T4 FREE SERPL-MCNC: 1.33 NG/DL (ref 0.9–1.8)
TOTAL IMMATURE NEUTOROPHIL: 0.02 K/CU MM
TOTAL NUCLEATED RBC: 0 K/CU MM
TOTAL PROTEIN: 6.2 GM/DL (ref 6.4–8.2)
TSH SERPL DL<=0.005 MIU/L-ACNC: 2.69 UIU/ML (ref 0.27–4.2)
WBC # BLD: 7.4 K/CU MM (ref 4–10.5)

## 2024-08-19 PROCEDURE — 80053 COMPREHEN METABOLIC PANEL: CPT

## 2024-08-19 PROCEDURE — 80307 DRUG TEST PRSMV CHEM ANLYZR: CPT

## 2024-08-19 PROCEDURE — 84443 ASSAY THYROID STIM HORMONE: CPT

## 2024-08-19 PROCEDURE — 2580000003 HC RX 258: Performed by: STUDENT IN AN ORGANIZED HEALTH CARE EDUCATION/TRAINING PROGRAM

## 2024-08-19 PROCEDURE — 2140000000 HC CCU INTERMEDIATE R&B

## 2024-08-19 PROCEDURE — 36415 COLL VENOUS BLD VENIPUNCTURE: CPT

## 2024-08-19 PROCEDURE — 6370000000 HC RX 637 (ALT 250 FOR IP): Performed by: STUDENT IN AN ORGANIZED HEALTH CARE EDUCATION/TRAINING PROGRAM

## 2024-08-19 PROCEDURE — 85025 COMPLETE CBC W/AUTO DIFF WBC: CPT

## 2024-08-19 PROCEDURE — 93005 ELECTROCARDIOGRAM TRACING: CPT | Performed by: STUDENT IN AN ORGANIZED HEALTH CARE EDUCATION/TRAINING PROGRAM

## 2024-08-19 PROCEDURE — 94761 N-INVAS EAR/PLS OXIMETRY MLT: CPT

## 2024-08-19 PROCEDURE — 71045 X-RAY EXAM CHEST 1 VIEW: CPT

## 2024-08-19 PROCEDURE — 71250 CT THORAX DX C-: CPT

## 2024-08-19 PROCEDURE — 85610 PROTHROMBIN TIME: CPT

## 2024-08-19 PROCEDURE — 93010 ELECTROCARDIOGRAM REPORT: CPT | Performed by: INTERNAL MEDICINE

## 2024-08-19 PROCEDURE — 84439 ASSAY OF FREE THYROXINE: CPT

## 2024-08-19 PROCEDURE — 99253 IP/OBS CNSLTJ NEW/EST LOW 45: CPT | Performed by: THORACIC SURGERY (CARDIOTHORACIC VASCULAR SURGERY)

## 2024-08-19 RX ORDER — CYCLOBENZAPRINE HCL 10 MG
10 TABLET ORAL 3 TIMES DAILY PRN
Status: DISCONTINUED | OUTPATIENT
Start: 2024-08-19 | End: 2024-08-24 | Stop reason: HOSPADM

## 2024-08-19 RX ADMIN — SODIUM CHLORIDE, PRESERVATIVE FREE 10 ML: 5 INJECTION INTRAVENOUS at 21:01

## 2024-08-19 RX ADMIN — CYCLOBENZAPRINE 10 MG: 10 TABLET, FILM COATED ORAL at 20:52

## 2024-08-19 ASSESSMENT — PAIN SCALES - GENERAL
PAINLEVEL_OUTOF10: 6
PAINLEVEL_OUTOF10: 3
PAINLEVEL_OUTOF10: 0

## 2024-08-19 ASSESSMENT — PAIN DESCRIPTION - ORIENTATION: ORIENTATION: RIGHT;MID

## 2024-08-19 ASSESSMENT — PAIN DESCRIPTION - LOCATION: LOCATION: CHEST

## 2024-08-19 ASSESSMENT — PAIN - FUNCTIONAL ASSESSMENT: PAIN_FUNCTIONAL_ASSESSMENT: ACTIVITIES ARE NOT PREVENTED

## 2024-08-19 NOTE — FLOWSHEET NOTE
Brief update sent to Jaxson Garcia CNP regarding pt. Hr 40's - 50's. Sbp 110's - 130's. Pt. States that that is normal for her.

## 2024-08-19 NOTE — H&P
History and Physical      Name:  Clarisse Arriaga /Age/Sex: 1990  (34 y.o. female)   MRN & CSN:  [id number] & [id number] Encounter Date/Time: 2024 8:25 PM   [facility] PCP: No primary care provider on file.       Hospital Day: 1    Assessment and Plan:     Patient is a 34-year-old female who presented with right chest pain. Transferred from [place] ED.     # Recurrent right spontaneous pneumothorax   # Hx of right spontaneous tension pneumothorax in 2017  # Hx of left spontaneous pneumothorax s/p VATS in 2023  - Endorsed right-sided chest pain over past few days that suddenly started while resting, no trauma or injuries. Has mild SOB. No fevers or cough.    - Hemodynamically stable. Initial CXR showed large right tension pneumothorax. Had right pigtail catheter placed in ED with repeat CXR showing resolution of pneumothorax.   - Continue supplemental oxygen. CTS consulted, appreciate assistance, NPO past midnight.    # GERD  - Continue PPI.    Checklist:  Advanced care planning: full  Diet: NPO past midnight pending surgical evaluation   DVT ppx: Lovenox (held for now)    Disposition: admit to inpatient.  Estimated discharge: 3-4 day(s).  Current living situation: home.  Expected disposition: home.    Spoke with ED provider who recommended admission for the patient and I agree with that plan.  Personally reviewed lab studies and imaging.  EKG interpreted personally and results as stated above.  Imaging that was interpreted personally and results as stated above.    History of Present Illness:     Chief Complaint: Right chest pain    Patient is a 34-year-old female with a PMHx of right spontaneous tension pneumothorax in 2017, and left spontaneous pneumothorax s/p VATS in 2023 who presented to the ED with right-sided chest pain over past few days that suddenly started while resting, no trauma or injuries. Has mild SOB. No fevers or cough. Denied any typical CP, N/V, abdominal  Smokeless tobacco: Never   Vaping Use    Vaping status: Never Used   Substance and Sexual Activity    Alcohol use: No    Drug use: Yes     Types: Marijuana (Weed)     Comment: occ.    Sexual activity: Yes     Partners: Male     Social Determinants of Health     Food Insecurity: No Food Insecurity (11/20/2023)    Hunger Vital Sign     Worried About Running Out of Food in the Last Year: Never true     Ran Out of Food in the Last Year: Never true   Transportation Needs: No Transportation Needs (11/20/2023)    PRAPARE - Transportation     Lack of Transportation (Medical): No     Lack of Transportation (Non-Medical): No   Housing Stability: Low Risk  (11/20/2023)    Housing Stability Vital Sign     Unable to Pay for Housing in the Last Year: No     Number of Places Lived in the Last Year: 1     Unstable Housing in the Last Year: No       Medications Prior to Admission     Prior to Admission medications    Medication Sig Start Date End Date Taking? Authorizing Provider   celecoxib (CELEBREX) 200 MG capsule Take 1 capsule by mouth 2 times daily for 30 doses 11/23/23 12/8/23  Miguel A Zhong PA   gabapentin (NEURONTIN) 300 MG capsule Take 1 capsule by mouth 3 times daily for 30 doses. 11/23/23 12/3/23  Miguel A Zhong PA   famotidine (PEPCID) 20 MG tablet Take 1 tablet by mouth 2 times daily 10/27/20   Keisha Kohler APRN - CNP       Medications:     Medications:        Infusions:       PRN Meds:       Data:     CBC:   Recent Labs     08/18/24  1600   WBC 7.0   HGB 13.4      MCV 91.9   RDW 11.8   LYMPHOPCT 20.1*   MONOPCT 5.4*   EOSPCT 0.9   BASOPCT 0.4   MONOSABS 0.4   LYMPHSABS 1.4   EOSABS 0.1   BASOSABS 0.0     CMP:    Recent Labs     08/18/24  1600      K 4.0      CO2 23   BUN 9   CREATININE 0.7   GLUCOSE 77   CALCIUM 8.7   BILITOT 0.3   ALKPHOS 52   AST 10*   ALT <5*     Lipids:   Lab Results   Component Value Date/Time    CHOL 99 11/09/2017 09:17 PM    HDL 51 10/27/2020 02:44 PM    TRIG 58  11/09/2017 09:17 PM     Hemoglobin A1C:   Lab Results   Component Value Date/Time    LABA1C 4.3 11/09/2017 09:17 PM     TSH:   Lab Results   Component Value Date/Time    TSH 4.18 10/27/2020 02:44 PM     Troponin:   Lab Results   Component Value Date/Time    TROPONINT <0.010 01/06/2016 04:35 PM     BNP: No results for input(s): \"PROBNP\" in the last 72 hours.  Lactic Acid: No results for input(s): \"LACTA\" in the last 72 hours.  UA:  Lab Results   Component Value Date/Time    NITRU POSITIVE 11/16/2023 09:03 AM    NITRU NEGATIVE 03/02/2012 03:20 PM    COLORU YELLOW 11/16/2023 09:03 AM    PHUR 6.5 11/16/2023 09:03 AM    WBCUA 1 11/16/2023 09:03 AM    RBCUA 12 11/16/2023 09:03 AM    MUCUS RARE 11/16/2023 09:03 AM    TRICHOMONAS NONE SEEN 11/16/2023 09:03 AM    BACTERIA MANY 11/16/2023 09:03 AM    CLARITYU SLIGHTLY CLOUDY 11/16/2023 09:03 AM    LEUKOCYTESUR SMALL NUMBER OR AMOUNT OBSERVED 11/16/2023 09:03 AM    UROBILINOGEN 0.2 11/16/2023 09:03 AM    BILIRUBINUR NEGATIVE 11/16/2023 09:03 AM    BLOODU LARGE NUMBER OR AMOUNT OBSERVED 11/16/2023 09:03 AM    GLUCOSEU NEGATIVE 11/16/2023 09:03 AM    GLUCOSEU NEGATIVE 03/02/2012 03:20 PM    KETUA NEGATIVE 11/16/2023 09:03 AM     Urine Cultures: No results found for: \"LABURIN\"  Blood Cultures: No results found for: \"BC\"  No results found for: \"BLOODCULT2\"  Organism: No results found for: \"ORG\"    Radiology results:  No orders to display       Fareed Ocampo MD  08/18/24 8:25 PM

## 2024-08-19 NOTE — CARE COORDINATION
.CM has reviewed pt's chart for needs. CM screening shows that pt has insurance and is independent PTA. No PCP listed.  PCP list added to d/c instructions.  If any d/c needs arise please contact JUAN. TE

## 2024-08-19 NOTE — CARE COORDINATION
Spoke with pt in room. Pt remains living at home with her parents and 13 yr old daughter. Pt works at [employer]. Parents provide transportation. Has PCP and insurance. Independent of her ADLs.   Denies all discharge needs. CM remains available if needed.    08/19/24 2495   Service Assessment   Patient Orientation Alert and Oriented   Cognition Alert   History Provided By Patient;Medical Record   Primary Caregiver Self   Support Systems Parent;Children   Patient's Healthcare Decision Maker is: Legal Next of Kin   PCP Verified by CM Yes  ([facility])   Last Visit to PCP Within last year   Prior Functional Level Independent in ADLs/IADLs   Current Functional Level Independent in ADLs/IADLs   Can patient return to prior living arrangement Yes   Ability to make needs known: Good   Family able to assist with home care needs: Yes   Would you like for me to discuss the discharge plan with any other family members/significant others, and if so, who? Yes  (emergency contacts if needed)   Financial Resources Medicaid   Community Resources None   Social/Functional History   Lives With Parent;Daughter;Family

## 2024-08-19 NOTE — FLOWSHEET NOTE
Pt refuses to be placed on oxygen. Ordered per Dr Ocampo to help  with pneumo. Aware that pt. Is 99% on RA.

## 2024-08-19 NOTE — DISCHARGE INSTRUCTIONS
.1. Call to schedule follow up hospital follow up appointment:   [facility]  [address]  [address]  [place], [place] [address]  Tel: [phone]    [facility]  [address]  [place], [place] [address]  [phone]     [facility]   [address], [place]   [phone]     2. Establish care with a primary care provider  Physician Finder [phone]     [facility]  [address], [address], [place] [address]  [phone]  JULIENNE Valderrama,CNP    [facility]  [address] [place] [address]  [phone]  MD Adele Alvarado MD Deanna N. Smith, APRN, CNP  Ellen Mason, JULIENNE Barker, CNP     [facility]  [address], [address], [place] [address]  [phone]  Henrik Gerardo, DO Leo Faustin PAMD Mina Luz PAYULY    [facility]  [address] [place] [address]  [phone]  Kavya German MD    [facility]  [address] [place] [address] [phone]  MD Miguel Campo MD    [facility]  [address] [place] [address]  [phone]  JULIENNE Gonsalez, JULIENNE Garcia, CNP   MD Bette Rose, PABevC   Josefina Stevens MD    [facility] (*Active Waiting List*)   [address] [place], [place]  [phone]    Dr. Syl Marks MD  [address] [place], [place] [id number] [phone]    [facility]  [address], [place] [address] [phone]    [facility]  [address]  , [place] [address] [phone]  DO Ranjit Almendarez

## 2024-08-19 NOTE — CONSULTS
Cardiothoracic Surgery  IN-PATIENT SERVICE   [facility]    CONSULTATION / HISTORY AND PHYSICAL EXAMINATION            Date:   8/19/2024  Patient name:  Clarisse Arriaga  Date of admission:  8/18/2024  8:12 PM  MRN:   [id number]  Account:  [id number]  YOB: 1990  PCP:    No primary care provider on file.  Room:   [address] [place] [address]  Code Status:    Full Code    Physician Requesting Consult: Juvenal Murcia MD    Reason for Consult: Recurrent pneumothorax    Chief Complaint:     Shortness of breath and chest pressure    History of Present Illness:     Patient is a 34-year-old female with a past medical history significant for spontaneous right tension pneumothorax in November 2017, history of VATS in November 2023.  She presented to the emergency department with complaints of right-sided chest pain that has been progressing over the past few days.  She admits to shortness of breath.  She denies any fever or cough.  Denies any trauma or injuries.  States it all started when she was sitting doing nothing.  Denies any fever, chills, nausea, vomiting, headache, lightheadedness, dizziness, or palpitations.  Chest x-ray was obtained that revealed right tension pneumothorax.  Right pigtail catheter was placed in the emergency department and repeat chest x-ray showed resolution of the pneumothorax.  CT surgery team was consulted to assess the patient in regards to chest tube management and possible surgical intervention.  She is currently on room air.  She is comfortable.  Chest x-ray this morning showed increase in the size of pneumothorax.  Chest tube in place with airleak noted on the Pleur-evac.  Chest tube was not to suction when I went into the room.  I placed it to suction.  I zip tied all connections to the chest tube.  We will repeat a chest x-ray later today.    Past Medical History:     Past Medical History:   Diagnosis Date    GERD (gastroesophageal reflux

## 2024-08-19 NOTE — PROGRESS NOTES
V2.0    [facility] Progress Note      Name:  Clarisse Arriaga /Age/Sex: 1990  (34 y.o. female)   MRN & CSN:  [id number] & [id number] Encounter Date/Time: 2024 11:55 AM EDT   [facility] PCP: No primary care provider on file.     Attending:Juvenal Murcia MD       Hospital Day: 2    Assessment and Recommendations   Clarisse Arriaga is a 34 y.o. femalewho presents with Spontaneous pneumothorax      Plan:    # Recurrent right spontaneous pneumothorax   # Hx of right spontaneous tension pneumothorax in 2017  # Hx of left spontaneous pneumothorax s/p VATS in 2023  - Endorsed right-sided chest pain over past few days that suddenly started while resting, no trauma or injuries. Has mild SOB. No fevers or cough.    - Hemodynamically stable. Initial CXR showed large right pneumothorax. Had right pigtail catheter placed in ED with repeat CXR showing resolution of pneumothorax.   -Continue supplemental oxygen, CT surgery consult placed.  They recommended to suction.  Continue for now and monitor for signs of hemodynamic instability.     # GERD  - Continue PPI.    # Bradycardia  -Likely physiologic given age.  Only at night.  Asymptomatic and hemodynamically stable.  Continue to monitor.    Diet ADULT DIET; Regular   DVT Prophylaxis [] Lovenox, []  Heparin, [] SCDs, [x] Ambulation,  [] Eliquis, [] Xarelto  [] Coumadin   Code Status Full Code   Disposition From: Home  Expected Disposition: Home  Estimated Date of Discharge: 2-3 days  Patient requires continued admission due to pnuemothorax   Surrogate Decision Maker/ Christa Davila (Parent)      Personally reviewed Lab Studies and Imaging         Imaging that was interpreted personally includes cxr and results right sided pneumo            Subjective:     Chief Complaint: Right-sided chest pain    Reports some discomfort at the chest tube site otherwise feels okay.  Denies any chest pain, shortness of breath, nausea or vomiting.  Reports she is  Clinical Indication: Chest pain TECHNIQUE: AP view of the chest were performed. COMPARISON: CR chest: August 18, 2024 FINDINGS: Lungs: Minimal lingular infiltrate is noted. Pleural spaces: There is a 10% right pneumothorax. The pneumothorax is increased in size since prior study on August 18, 2024. Surgical clips are noted along the medial left superior mediastinum /Mediastinum: The aorta is atherosclerotic. Left ventricular prominence is present. Bones/Joints: Mild degenerative changes are noted of the glenohumeral joints and thoracic spine. Free air: None Tubes lines devices: Chest tube projects over the right mid hemithorax.     There is a 10% right pneumothorax. The pneumothorax is increased in size since prior study on August 18, 2024. Surgical clips are noted along the medial left superior mediastinum Minimal lingular infiltrates noted. Reported to  Nurse GIUSEPPE Gross  at 915am  on 8/19, Electronically signed by Barbie Hodges MD    XR CHEST PORTABLE    Result Date: 8/18/2024  EXAMINATION:  CHEST RADIOGRAPH (PORTABLE SINGLE VIEW AP) Exam Date/Time:  8/18/2024 4:32 PM Clinical History:  chest pain Comparison: 8/18/2024  RESULT: Lines, tubes, and devices: Catheter overlying the right chest wall. Lungs and pleura:  No focal consolidation. No pneumothorax. No pleural effusion. Cardiomediastinal silhouette:  Stable cardiomediastinal silhouette.  Other: No acute osseous process.      No acute cardiopulmonary abnormality. Electronically signed by Kendell Fountain    XR CHEST (2 VW)    Result Date: 8/18/2024  Chest X-ray INDICATION: Chest pain COMPARISON: 12/7/2023 TECHNIQUE: PA and lateral views of the chest were obtained. FINDINGS: The lungs are clear. There are no infiltrates or effusions. There is a large right-sided pneumothorax with slight shift of the mediastinum to the left consistent with a tension pneumothorax. The heart size is normal.  The bony thorax is intact.      Large right tension  pneumothorax. Laura, the patient's nurse, was notified on 8/18/2024 at 1600. Electronically signed by Harry Dominguez      CBC:   Recent Labs     08/18/24  1600 08/19/24  0145   WBC 7.0 7.4   HGB 13.4 12.2*    202     BMP:    Recent Labs     08/18/24  1600 08/19/24  0145    136   K 4.0 4.1    101   CO2 23 25   BUN 9 7   CREATININE 0.7 0.8   GLUCOSE 77 88     Hepatic:   Recent Labs     08/18/24  1600 08/19/24  0145   AST 10* 10*   ALT <5* 5*   BILITOT 0.3 0.5   ALKPHOS 52 46     Lipids:   Lab Results   Component Value Date/Time    CHOL 99 11/09/2017 09:17 PM    HDL 51 10/27/2020 02:44 PM    TRIG 58 11/09/2017 09:17 PM     Hemoglobin A1C:   Lab Results   Component Value Date/Time    LABA1C 4.3 11/09/2017 09:17 PM     TSH:   Lab Results   Component Value Date/Time    TSH 4.18 10/27/2020 02:44 PM     Troponin:   Lab Results   Component Value Date/Time    TROPONINT <0.010 01/06/2016 04:35 PM       UA:  Lab Results   Component Value Date/Time    NITRU POSITIVE 11/16/2023 09:03 AM    NITRU NEGATIVE 03/02/2012 03:20 PM    COLORU YELLOW 11/16/2023 09:03 AM    PHUR 6.5 11/16/2023 09:03 AM    WBCUA 1 11/16/2023 09:03 AM    RBCUA 12 11/16/2023 09:03 AM    MUCUS RARE 11/16/2023 09:03 AM    TRICHOMONAS NONE SEEN 11/16/2023 09:03 AM    BACTERIA MANY 11/16/2023 09:03 AM    CLARITYU SLIGHTLY CLOUDY 11/16/2023 09:03 AM    LEUKOCYTESUR SMALL NUMBER OR AMOUNT OBSERVED 11/16/2023 09:03 AM    UROBILINOGEN 0.2 11/16/2023 09:03 AM    BILIRUBINUR NEGATIVE 11/16/2023 09:03 AM    BLOODU LARGE NUMBER OR AMOUNT OBSERVED 11/16/2023 09:03 AM    GLUCOSEU NEGATIVE 11/16/2023 09:03 AM    GLUCOSEU NEGATIVE 03/02/2012 03:20 PM    KETUA NEGATIVE 11/16/2023 09:03 AM           Electronically signed by Juvenal Murcia MD on 8/19/2024 at 11:55 AM

## 2024-08-19 NOTE — FLOWSHEET NOTE
4 Eyes Skin Assessment     NAME:  Clarisse Arriaga  YOB: 1990  MEDICAL RECORD NUMBER:  [id number]    The patient is being assessed for  Admission    I agree that at least one RN has performed a thorough Head to Toe Skin Assessment on the patient. ALL assessment sites listed below have been assessed.      Areas assessed by both nurses:    Full body        Does the Patient have a Wound? No noted wound(s)       Jose Guadalupe Prevention initiated by RN: Yes  Wound Care Orders initiated by RN: No    Pressure Injury (Stage 3,4, Unstageable, DTI, NWPT, and Complex wounds) if present, place Wound referral order by RN under : No    New Ostomies, if present place, Ostomy referral order under : No     Nurse 1 eSignature: Electronically signed by Adele Bob RN on 8/18/24 at 8:31 PM EDT    **SHARE this note so that the co-signing nurse can place an eSignature**    Nurse 2 eSignature: Electronically signed by Gene Helm RN on 8/19/24 at 1:38 AM EDT

## 2024-08-20 ENCOUNTER — ANESTHESIA EVENT (OUTPATIENT)
Dept: OPERATING ROOM | Age: 34
End: 2024-08-20
Payer: MEDICAID

## 2024-08-20 ENCOUNTER — APPOINTMENT (OUTPATIENT)
Dept: GENERAL RADIOLOGY | Age: 34
End: 2024-08-20
Attending: STUDENT IN AN ORGANIZED HEALTH CARE EDUCATION/TRAINING PROGRAM
Payer: MEDICAID

## 2024-08-20 LAB
ABO/RH: NORMAL
ANION GAP SERPL CALCULATED.3IONS-SCNC: 10 MMOL/L (ref 7–16)
ANTIBODY SCREEN: NEGATIVE
APTT: 26.5 SECONDS (ref 25.1–37.1)
BUN SERPL-MCNC: 7 MG/DL (ref 6–23)
CALCIUM SERPL-MCNC: 9.2 MG/DL (ref 8.3–10.6)
CHLORIDE BLD-SCNC: 98 MMOL/L (ref 99–110)
CO2: 28 MMOL/L (ref 21–32)
CREAT SERPL-MCNC: 0.7 MG/DL (ref 0.6–1.1)
GFR, ESTIMATED: >90 ML/MIN/1.73M2
GLUCOSE BLD-MCNC: 83 MG/DL (ref 70–99)
GLUCOSE SERPL-MCNC: 81 MG/DL (ref 70–99)
HCT VFR BLD CALC: 39.3 % (ref 37–47)
HEMOGLOBIN: 13.3 GM/DL (ref 12.5–16)
INR BLD: 1 INDEX
MCH RBC QN AUTO: 31.4 PG (ref 27–31)
MCHC RBC AUTO-ENTMCNC: 33.8 % (ref 32–36)
MCV RBC AUTO: 92.7 FL (ref 78–100)
PDW BLD-RTO: 11.5 % (ref 11.7–14.9)
PLATELET # BLD: 225 K/CU MM (ref 140–440)
PMV BLD AUTO: 9.6 FL (ref 7.5–11.1)
POTASSIUM SERPL-SCNC: 3.9 MMOL/L (ref 3.5–5.1)
PROTHROMBIN TIME: 13.8 SECONDS (ref 11.7–14.5)
RBC # BLD: 4.24 M/CU MM (ref 4.2–5.4)
SODIUM BLD-SCNC: 136 MMOL/L (ref 135–145)
WBC # BLD: 5.9 K/CU MM (ref 4–10.5)

## 2024-08-20 PROCEDURE — 85730 THROMBOPLASTIN TIME PARTIAL: CPT

## 2024-08-20 PROCEDURE — 94761 N-INVAS EAR/PLS OXIMETRY MLT: CPT

## 2024-08-20 PROCEDURE — 86900 BLOOD TYPING SEROLOGIC ABO: CPT

## 2024-08-20 PROCEDURE — 36415 COLL VENOUS BLD VENIPUNCTURE: CPT

## 2024-08-20 PROCEDURE — 6370000000 HC RX 637 (ALT 250 FOR IP): Performed by: STUDENT IN AN ORGANIZED HEALTH CARE EDUCATION/TRAINING PROGRAM

## 2024-08-20 PROCEDURE — 82962 GLUCOSE BLOOD TEST: CPT

## 2024-08-20 PROCEDURE — 85610 PROTHROMBIN TIME: CPT

## 2024-08-20 PROCEDURE — 2580000003 HC RX 258: Performed by: PHYSICIAN ASSISTANT

## 2024-08-20 PROCEDURE — 2140000000 HC CCU INTERMEDIATE R&B

## 2024-08-20 PROCEDURE — 86901 BLOOD TYPING SEROLOGIC RH(D): CPT

## 2024-08-20 PROCEDURE — 80048 BASIC METABOLIC PNL TOTAL CA: CPT

## 2024-08-20 PROCEDURE — 86850 RBC ANTIBODY SCREEN: CPT

## 2024-08-20 PROCEDURE — 85027 COMPLETE CBC AUTOMATED: CPT

## 2024-08-20 PROCEDURE — 99232 SBSQ HOSP IP/OBS MODERATE 35: CPT | Performed by: THORACIC SURGERY (CARDIOTHORACIC VASCULAR SURGERY)

## 2024-08-20 PROCEDURE — 2580000003 HC RX 258: Performed by: STUDENT IN AN ORGANIZED HEALTH CARE EDUCATION/TRAINING PROGRAM

## 2024-08-20 PROCEDURE — 71045 X-RAY EXAM CHEST 1 VIEW: CPT

## 2024-08-20 RX ORDER — SODIUM CHLORIDE 9 MG/ML
INJECTION, SOLUTION INTRAVENOUS PRN
Status: DISCONTINUED | OUTPATIENT
Start: 2024-08-20 | End: 2024-08-21 | Stop reason: HOSPADM

## 2024-08-20 RX ORDER — SODIUM CHLORIDE 0.9 % (FLUSH) 0.9 %
5-40 SYRINGE (ML) INJECTION EVERY 12 HOURS SCHEDULED
Status: DISCONTINUED | OUTPATIENT
Start: 2024-08-20 | End: 2024-08-21 | Stop reason: HOSPADM

## 2024-08-20 RX ORDER — SODIUM CHLORIDE, SODIUM LACTATE, POTASSIUM CHLORIDE, CALCIUM CHLORIDE 600; 310; 30; 20 MG/100ML; MG/100ML; MG/100ML; MG/100ML
INJECTION, SOLUTION INTRAVENOUS CONTINUOUS
Status: DISCONTINUED | OUTPATIENT
Start: 2024-08-20 | End: 2024-08-21 | Stop reason: HOSPADM

## 2024-08-20 RX ORDER — SODIUM CHLORIDE 0.9 % (FLUSH) 0.9 %
5-40 SYRINGE (ML) INJECTION PRN
Status: DISCONTINUED | OUTPATIENT
Start: 2024-08-20 | End: 2024-08-21 | Stop reason: HOSPADM

## 2024-08-20 RX ADMIN — SODIUM CHLORIDE, PRESERVATIVE FREE 10 ML: 5 INJECTION INTRAVENOUS at 20:03

## 2024-08-20 RX ADMIN — CYCLOBENZAPRINE 10 MG: 10 TABLET, FILM COATED ORAL at 04:05

## 2024-08-20 RX ADMIN — FAMOTIDINE 20 MG: 20 TABLET ORAL at 20:02

## 2024-08-20 RX ADMIN — SODIUM CHLORIDE, PRESERVATIVE FREE 10 ML: 5 INJECTION INTRAVENOUS at 09:48

## 2024-08-20 RX ADMIN — SODIUM CHLORIDE, POTASSIUM CHLORIDE, SODIUM LACTATE AND CALCIUM CHLORIDE: 600; 310; 30; 20 INJECTION, SOLUTION INTRAVENOUS at 16:06

## 2024-08-20 ASSESSMENT — PAIN SCALES - GENERAL
PAINLEVEL_OUTOF10: 0
PAINLEVEL_OUTOF10: 2
PAINLEVEL_OUTOF10: 7
PAINLEVEL_OUTOF10: 0
PAINLEVEL_OUTOF10: 6

## 2024-08-20 ASSESSMENT — PAIN DESCRIPTION - ORIENTATION: ORIENTATION: RIGHT;OTHER (COMMENT)

## 2024-08-20 ASSESSMENT — PAIN DESCRIPTION - LOCATION: LOCATION: CHEST

## 2024-08-20 ASSESSMENT — PAIN - FUNCTIONAL ASSESSMENT: PAIN_FUNCTIONAL_ASSESSMENT: ACTIVITIES ARE NOT PREVENTED

## 2024-08-20 NOTE — PROGRESS NOTES
Cardiothoracic Surgery  IN-PATIENT SERVICE   [facility]    Daily Progress Note            Date:   8/20/2024  Patient name:  Clarisse Arriaga  Date of admission:  8/18/2024  8:12 PM  MRN:   [id number]  Account:  [id number]  YOB: 1990  PCP:    No primary care provider on file.  Room:   [address] [place] [address]  Code Status:    Full Code    Physician Requesting Consult: Josemanuel Bowers MD    Reason for Consult: Recurrent pneumothorax    Chief Complaint:     Shortness of breath and chest pressure    History of Present Illness:     Patient is a 34-year-old female with a past medical history significant for spontaneous right tension pneumothorax in November 2017, history of VATS in November 2023.  She presented to the emergency department with complaints of right-sided chest pain that has been progressing over the past few days.  She admits to shortness of breath.  She denies any fever or cough.  Denies any trauma or injuries.  States it all started when she was sitting doing nothing.  Denies any fever, chills, nausea, vomiting, headache, lightheadedness, dizziness, or palpitations.  Chest x-ray was obtained that revealed right tension pneumothorax.  Right pigtail catheter was placed in the emergency department and repeat chest x-ray showed resolution of the pneumothorax.  CT surgery team was consulted to assess the patient in regards to chest tube management and possible surgical intervention.  She is currently on room air.  She is comfortable.  Chest x-ray this morning showed increase in the size of pneumothorax.  Chest tube in place with airleak noted on the Pleur-evac.  Chest tube was not to suction when I went into the room.  I placed it to suction.  I zip tied all connections to the chest tube.  We repeated CXR and Ct chest. Patient will most likely require surgical intervention.     Plan for surgery tomorrow morning.     Past Medical History:     Past Medical  normal muscle tone and bulk, no abnormal sensation, normal speech  Skin: No gross lesions, rashes, bruising or bleeding on exposed skin area  Extremities:  peripheral pulses palpable, no pedal edema or calf pain with palpation  Psych: normal affect    Chest tube output: 13 cc    Investigations:      Laboratory Testing:  Recent Results (from the past 24 hour(s))   EKG 12 Lead    Collection Time: 08/19/24  9:12 AM   Result Value Ref Range    Ventricular Rate 49 BPM    Atrial Rate 49 BPM    P-R Interval 114 ms    QRS Duration 90 ms    Q-T Interval 474 ms    QTc Calculation (Bazett) 428 ms    P Axis 55 degrees    R Axis 78 degrees    T Axis 73 degrees    Diagnosis       Sinus bradycardia  Otherwise normal ECG  When compared with ECG of 20-NOV-2023 13:05,  Nonspecific T wave abnormality no longer evident in Inferior leads  Confirmed by BRADFORD KAMINSKI ([id number]) on 8/19/2024 6:22:52 PM     Drug screen multi urine    Collection Time: 08/19/24 12:02 PM   Result Value Ref Range    Cannabinoid Scrn, Ur UNCONFIRMED POSITIVE (A) NEGATIVE    Amphetamines NEGATIVE NEGATIVE    Cocaine Metabolite NEGATIVE NEGATIVE    Benzodiazepine Screen, Urine NEGATIVE NEGATIVE    Barbiturate Screen, Ur NEGATIVE NEGATIVE    Opiates, Urine UNCONFIRMED POSITIVE (A) NEGATIVE    Oxycodone UNCONFIRMED POSITIVE (A) NEGATIVE    Fentanyl, Ur NEGATIVE NEGATIVE     CT CHEST     INDICATION: Preop lung surgery     Multiple 2D axial images were obtained through the chest without IV contrast.   Radiation dose reduction techniques were used for this study:  All CT scans  performed at this facility use one or all of the following: Automated exposure  control, adjustment of the mA and/or kVp according to patient's size, iterative  reconstruction.     COMPARISON: None     FINDINGS:  - LUNGS: Mild left apical pneumothorax with left anterior chest tube.  - MEDIASTINUM/AXILLA: No significant adenopathy.     - HEART/VESSELS: Normal.  - CHEST WALL/BONES: Normal.  -

## 2024-08-20 NOTE — ANESTHESIA PRE PROCEDURE
Department of Anesthesiology  Preprocedure Note       Name:  Clarisse Arriaga   Age:  34 y.o.  :  1990                                          MRN:  [id number]         Date:  2024      Surgeon: Surgeon(s):  George Vigil MD    Procedure: Procedure(s):  RIGHT VATS THORACOSCOPY WITH BLEB RESECTION WITH TALC PLEURODESIS    Medications prior to admission:   Prior to Admission medications    Medication Sig Start Date End Date Taking? Authorizing Provider   celecoxib (CELEBREX) 200 MG capsule Take 1 capsule by mouth 2 times daily for 30 doses 23  Miguel A Zhong PA   gabapentin (NEURONTIN) 300 MG capsule Take 1 capsule by mouth 3 times daily for 30 doses. 11/23/23 12/3/23  Miguel A Zhong PA   famotidine (PEPCID) 20 MG tablet Take 1 tablet by mouth 2 times daily 10/27/20   Keisha Kohler APRN - CNP       Current medications:    Current Facility-Administered Medications   Medication Dose Route Frequency Provider Last Rate Last Admin   • cyclobenzaprine (FLEXERIL) tablet 10 mg  10 mg Oral TID PRN Juvenal Murcia MD   10 mg at 24 0405   • famotidine (PEPCID) tablet 20 mg  20 mg Oral BID Fareed Ocampo MD       • sodium chloride flush 0.9 % injection 5-40 mL  5-40 mL IntraVENous 2 times per day Fareed Ocampo MD   10 mL at 24 0948   • sodium chloride flush 0.9 % injection 5-40 mL  5-40 mL IntraVENous PRN Fareed Ocampo MD       • 0.9 % sodium chloride infusion   IntraVENous PRN Fareed Ocampo MD       • potassium chloride 10 mEq/100 mL IVPB (Peripheral Line)  10 mEq IntraVENous PRN Fareed Ocampo MD       • magnesium sulfate 2000 mg in 50 mL IVPB premix  2,000 mg IntraVENous PRN Fareed Ocampo MD       • ondansetron (ZOFRAN-ODT) disintegrating tablet 4 mg  4 mg Oral Q8H PRN Fareed Ocampo MD        Or   • ondansetron (ZOFRAN) injection 4 mg  4 mg IntraVENous Q6H PRN Fareed Ocampo MD       • melatonin tablet 3 mg  3 mg Oral Nightly PRN

## 2024-08-20 NOTE — PROGRESS NOTES
4 Eyes Skin Assessment     NAME:  Clarisse Arriaga  YOB: 1990  MEDICAL RECORD NUMBER:  [id number]    The patient is being assessed for  Shift Handoff    I agree that at least one RN has performed a thorough Head to Toe Skin Assessment on the patient. ALL assessment sites listed below have been assessed.      Areas assessed by both nurses:    Head, Face, Ears, Shoulders, Back, Chest, Arms, Elbows, Hands, Sacrum. Buttock, Coccyx, Ischium, Legs. Feet and Heels, and Under Medical Devices         Does the Patient have a Wound? Yes wound(s) were present on assessment. LDA wound assessment was Initiated and completed by RN       Jose Guadalupe Prevention initiated by RN: No  Wound Care Orders initiated by RN: No    Pressure Injury (Stage 3,4, Unstageable, DTI, NWPT, and Complex wounds) if present, place Wound referral order by RN under : No    New Ostomies, if present place, Ostomy referral order under : No     Nurse 1 eSignature: Electronically signed by Twan Parrish RN on 8/20/24 at 11:15 AM EDT    **SHARE this note so that the co-signing nurse can place an eSignature**    Nurse 2 eSignature: {Esignature:[id number]}

## 2024-08-21 ENCOUNTER — APPOINTMENT (OUTPATIENT)
Dept: GENERAL RADIOLOGY | Age: 34
End: 2024-08-21
Attending: STUDENT IN AN ORGANIZED HEALTH CARE EDUCATION/TRAINING PROGRAM
Payer: MEDICAID

## 2024-08-21 ENCOUNTER — ANESTHESIA (OUTPATIENT)
Dept: OPERATING ROOM | Age: 34
End: 2024-08-21
Payer: MEDICAID

## 2024-08-21 LAB
ANION GAP SERPL CALCULATED.3IONS-SCNC: 11 MMOL/L (ref 7–16)
ANION GAP SERPL CALCULATED.3IONS-SCNC: 9 MMOL/L (ref 7–16)
APTT: 24.9 SECONDS (ref 25.1–37.1)
BASOPHILS ABSOLUTE: 0.1 K/CU MM
BASOPHILS RELATIVE PERCENT: 0.8 % (ref 0–1)
BUN SERPL-MCNC: 10 MG/DL (ref 6–23)
BUN SERPL-MCNC: 7 MG/DL (ref 6–23)
CALCIUM IONIZED: NORMAL MMOL/L (ref 1.12–1.32)
CALCIUM SERPL-MCNC: 8.5 MG/DL (ref 8.3–10.6)
CALCIUM SERPL-MCNC: 9.1 MG/DL (ref 8.3–10.6)
CHLORIDE BLD-SCNC: 102 MMOL/L (ref 99–110)
CHLORIDE BLD-SCNC: 103 MMOL/L (ref 99–110)
CO2: 25 MMOL/L (ref 21–32)
CO2: 26 MMOL/L (ref 21–32)
CREAT SERPL-MCNC: 0.8 MG/DL (ref 0.6–1.1)
CREAT SERPL-MCNC: 0.8 MG/DL (ref 0.6–1.1)
DIFFERENTIAL TYPE: ABNORMAL
EOSINOPHILS ABSOLUTE: 0.1 K/CU MM
EOSINOPHILS RELATIVE PERCENT: 1.9 % (ref 0–3)
GFR, ESTIMATED: >90 ML/MIN/1.73M2
GFR, ESTIMATED: >90 ML/MIN/1.73M2
GLUCOSE BLD-MCNC: 129 MG/DL (ref 70–99)
GLUCOSE SERPL-MCNC: 136 MG/DL (ref 70–99)
GLUCOSE SERPL-MCNC: 86 MG/DL (ref 70–99)
HCT VFR BLD CALC: 36 % (ref 37–47)
HCT VFR BLD CALC: 40.1 % (ref 37–47)
HEMOGLOBIN: 12.2 GM/DL (ref 12.5–16)
HEMOGLOBIN: 13.8 GM/DL (ref 12.5–16)
IMMATURE NEUTROPHIL %: 0.3 % (ref 0–0.43)
INR BLD: 1 INDEX
INTERPRETATION: NORMAL
LYMPHOCYTES ABSOLUTE: 2.5 K/CU MM
LYMPHOCYTES RELATIVE PERCENT: 39.7 % (ref 24–44)
MAGNESIUM: 2 MG/DL (ref 1.8–2.4)
MCH RBC QN AUTO: 31.4 PG (ref 27–31)
MCH RBC QN AUTO: 31.4 PG (ref 27–31)
MCHC RBC AUTO-ENTMCNC: 33.9 % (ref 32–36)
MCHC RBC AUTO-ENTMCNC: 34.4 % (ref 32–36)
MCV RBC AUTO: 91.3 FL (ref 78–100)
MCV RBC AUTO: 92.8 FL (ref 78–100)
MONOCYTES ABSOLUTE: 0.5 K/CU MM
MONOCYTES RELATIVE PERCENT: 8 % (ref 0–4)
NEUTROPHILS ABSOLUTE: 3.1 K/CU MM
NEUTROPHILS RELATIVE PERCENT: 49.3 % (ref 36–66)
NUCLEATED RBC %: 0 %
PDW BLD-RTO: 11.3 % (ref 11.7–14.9)
PDW BLD-RTO: 11.6 % (ref 11.7–14.9)
PHOSPHORUS: 3.6 MG/DL (ref 2.5–4.9)
PLATELET # BLD: 215 K/CU MM (ref 140–440)
PLATELET # BLD: 239 K/CU MM (ref 140–440)
PMV BLD AUTO: 10.1 FL (ref 7.5–11.1)
PMV BLD AUTO: 9.7 FL (ref 7.5–11.1)
POTASSIUM SERPL-SCNC: 4.1 MMOL/L (ref 3.5–5.1)
POTASSIUM SERPL-SCNC: 4.2 MMOL/L (ref 3.5–5.1)
PREGNANCY, URINE: NEGATIVE
PROTHROMBIN TIME: 13.6 SECONDS (ref 11.7–14.5)
RBC # BLD: 3.88 M/CU MM (ref 4.2–5.4)
RBC # BLD: 4.39 M/CU MM (ref 4.2–5.4)
SODIUM BLD-SCNC: 138 MMOL/L (ref 135–145)
SODIUM BLD-SCNC: 138 MMOL/L (ref 135–145)
TOTAL IMMATURE NEUTOROPHIL: 0.02 K/CU MM
TOTAL NUCLEATED RBC: 0 K/CU MM
WBC # BLD: 6.4 K/CU MM (ref 4–10.5)
WBC # BLD: 8.6 K/CU MM (ref 4–10.5)

## 2024-08-21 PROCEDURE — 83735 ASSAY OF MAGNESIUM: CPT

## 2024-08-21 PROCEDURE — 2500000003 HC RX 250 WO HCPCS: Performed by: NURSE ANESTHETIST, CERTIFIED REGISTERED

## 2024-08-21 PROCEDURE — 85025 COMPLETE CBC W/AUTO DIFF WBC: CPT

## 2024-08-21 PROCEDURE — 88309 TISSUE EXAM BY PATHOLOGIST: CPT

## 2024-08-21 PROCEDURE — 6360000002 HC RX W HCPCS: Performed by: THORACIC SURGERY (CARDIOTHORACIC VASCULAR SURGERY)

## 2024-08-21 PROCEDURE — 7100000000 HC PACU RECOVERY - FIRST 15 MIN: Performed by: THORACIC SURGERY (CARDIOTHORACIC VASCULAR SURGERY)

## 2024-08-21 PROCEDURE — 81025 URINE PREGNANCY TEST: CPT

## 2024-08-21 PROCEDURE — 3700000000 HC ANESTHESIA ATTENDED CARE: Performed by: THORACIC SURGERY (CARDIOTHORACIC VASCULAR SURGERY)

## 2024-08-21 PROCEDURE — 6370000000 HC RX 637 (ALT 250 FOR IP): Performed by: THORACIC SURGERY (CARDIOTHORACIC VASCULAR SURGERY)

## 2024-08-21 PROCEDURE — 32655 THORACOSCOPY RESECT BULLAE: CPT | Performed by: THORACIC SURGERY (CARDIOTHORACIC VASCULAR SURGERY)

## 2024-08-21 PROCEDURE — 94664 DEMO&/EVAL PT USE INHALER: CPT

## 2024-08-21 PROCEDURE — 3700000001 HC ADD 15 MINUTES (ANESTHESIA): Performed by: THORACIC SURGERY (CARDIOTHORACIC VASCULAR SURGERY)

## 2024-08-21 PROCEDURE — 80048 BASIC METABOLIC PNL TOTAL CA: CPT

## 2024-08-21 PROCEDURE — 36415 COLL VENOUS BLD VENIPUNCTURE: CPT

## 2024-08-21 PROCEDURE — 6360000002 HC RX W HCPCS: Performed by: PHYSICIAN ASSISTANT

## 2024-08-21 PROCEDURE — 6370000000 HC RX 637 (ALT 250 FOR IP): Performed by: PHYSICIAN ASSISTANT

## 2024-08-21 PROCEDURE — 84100 ASSAY OF PHOSPHORUS: CPT

## 2024-08-21 PROCEDURE — 82803 BLOOD GASES ANY COMBINATION: CPT

## 2024-08-21 PROCEDURE — 32650 THORACOSCOPY W/PLEURODESIS: CPT | Performed by: THORACIC SURGERY (CARDIOTHORACIC VASCULAR SURGERY)

## 2024-08-21 PROCEDURE — 6360000002 HC RX W HCPCS: Performed by: NURSE ANESTHETIST, CERTIFIED REGISTERED

## 2024-08-21 PROCEDURE — 7100000000 HC PACU RECOVERY - FIRST 15 MIN

## 2024-08-21 PROCEDURE — 71045 X-RAY EXAM CHEST 1 VIEW: CPT

## 2024-08-21 PROCEDURE — 82962 GLUCOSE BLOOD TEST: CPT

## 2024-08-21 PROCEDURE — 82330 ASSAY OF CALCIUM: CPT

## 2024-08-21 PROCEDURE — 0BBC4ZZ EXCISION OF RIGHT UPPER LUNG LOBE, PERCUTANEOUS ENDOSCOPIC APPROACH: ICD-10-PCS | Performed by: THORACIC SURGERY (CARDIOTHORACIC VASCULAR SURGERY)

## 2024-08-21 PROCEDURE — 85610 PROTHROMBIN TIME: CPT

## 2024-08-21 PROCEDURE — C1729 CATH, DRAINAGE: HCPCS | Performed by: THORACIC SURGERY (CARDIOTHORACIC VASCULAR SURGERY)

## 2024-08-21 PROCEDURE — 7100000001 HC PACU RECOVERY - ADDTL 15 MIN: Performed by: THORACIC SURGERY (CARDIOTHORACIC VASCULAR SURGERY)

## 2024-08-21 PROCEDURE — 2580000003 HC RX 258: Performed by: PHYSICIAN ASSISTANT

## 2024-08-21 PROCEDURE — 85027 COMPLETE CBC AUTOMATED: CPT

## 2024-08-21 PROCEDURE — 3E0L4GC INTRODUCTION OF OTHER THERAPEUTIC SUBSTANCE INTO PLEURAL CAVITY, PERCUTANEOUS ENDOSCOPIC APPROACH: ICD-10-PCS | Performed by: THORACIC SURGERY (CARDIOTHORACIC VASCULAR SURGERY)

## 2024-08-21 PROCEDURE — 2709999900 HC NON-CHARGEABLE SUPPLY: Performed by: THORACIC SURGERY (CARDIOTHORACIC VASCULAR SURGERY)

## 2024-08-21 PROCEDURE — 2500000003 HC RX 250 WO HCPCS: Performed by: PHYSICIAN ASSISTANT

## 2024-08-21 PROCEDURE — 99232 SBSQ HOSP IP/OBS MODERATE 35: CPT | Performed by: THORACIC SURGERY (CARDIOTHORACIC VASCULAR SURGERY)

## 2024-08-21 PROCEDURE — 3600000015 HC SURGERY LEVEL 5 ADDTL 15MIN: Performed by: THORACIC SURGERY (CARDIOTHORACIC VASCULAR SURGERY)

## 2024-08-21 PROCEDURE — 2060000000 HC ICU INTERMEDIATE R&B

## 2024-08-21 PROCEDURE — 2700000000 HC OXYGEN THERAPY PER DAY

## 2024-08-21 PROCEDURE — 2720000010 HC SURG SUPPLY STERILE: Performed by: THORACIC SURGERY (CARDIOTHORACIC VASCULAR SURGERY)

## 2024-08-21 PROCEDURE — 94761 N-INVAS EAR/PLS OXIMETRY MLT: CPT

## 2024-08-21 PROCEDURE — 3600000005 HC SURGERY LEVEL 5 BASE: Performed by: THORACIC SURGERY (CARDIOTHORACIC VASCULAR SURGERY)

## 2024-08-21 PROCEDURE — 85730 THROMBOPLASTIN TIME PARTIAL: CPT

## 2024-08-21 PROCEDURE — 2100000000 HC CCU R&B

## 2024-08-21 PROCEDURE — 2580000003 HC RX 258: Performed by: NURSE ANESTHETIST, CERTIFIED REGISTERED

## 2024-08-21 PROCEDURE — 0W9940Z DRAINAGE OF RIGHT PLEURAL CAVITY WITH DRAINAGE DEVICE, PERCUTANEOUS ENDOSCOPIC APPROACH: ICD-10-PCS | Performed by: THORACIC SURGERY (CARDIOTHORACIC VASCULAR SURGERY)

## 2024-08-21 PROCEDURE — 94150 VITAL CAPACITY TEST: CPT

## 2024-08-21 RX ORDER — FAMOTIDINE 10 MG/ML
20 INJECTION, SOLUTION INTRAVENOUS 2 TIMES DAILY
Status: DISCONTINUED | OUTPATIENT
Start: 2024-08-21 | End: 2024-08-24 | Stop reason: HOSPADM

## 2024-08-21 RX ORDER — FENTANYL CITRATE 50 UG/ML
50 INJECTION, SOLUTION INTRAMUSCULAR; INTRAVENOUS EVERY 5 MIN PRN
Status: DISCONTINUED | OUTPATIENT
Start: 2024-08-21 | End: 2024-08-21 | Stop reason: HOSPADM

## 2024-08-21 RX ORDER — OXYCODONE HYDROCHLORIDE 5 MG/1
5 TABLET ORAL
Status: DISCONTINUED | OUTPATIENT
Start: 2024-08-21 | End: 2024-08-21 | Stop reason: HOSPADM

## 2024-08-21 RX ORDER — SODIUM CHLORIDE 0.9 % (FLUSH) 0.9 %
5-40 SYRINGE (ML) INJECTION EVERY 12 HOURS SCHEDULED
Status: DISCONTINUED | OUTPATIENT
Start: 2024-08-21 | End: 2024-08-24 | Stop reason: HOSPADM

## 2024-08-21 RX ORDER — SODIUM CHLORIDE 0.9 % (FLUSH) 0.9 %
5-40 SYRINGE (ML) INJECTION PRN
Status: DISCONTINUED | OUTPATIENT
Start: 2024-08-21 | End: 2024-08-21 | Stop reason: HOSPADM

## 2024-08-21 RX ORDER — GABAPENTIN 300 MG/1
300 CAPSULE ORAL 3 TIMES DAILY
Status: DISCONTINUED | OUTPATIENT
Start: 2024-08-21 | End: 2024-08-24 | Stop reason: HOSPADM

## 2024-08-21 RX ORDER — SODIUM CHLORIDE 0.9 % (FLUSH) 0.9 %
5-40 SYRINGE (ML) INJECTION EVERY 12 HOURS SCHEDULED
Status: DISCONTINUED | OUTPATIENT
Start: 2024-08-21 | End: 2024-08-21 | Stop reason: HOSPADM

## 2024-08-21 RX ORDER — ONDANSETRON 4 MG/1
4 TABLET, ORALLY DISINTEGRATING ORAL EVERY 8 HOURS PRN
Status: DISCONTINUED | OUTPATIENT
Start: 2024-08-21 | End: 2024-08-24 | Stop reason: HOSPADM

## 2024-08-21 RX ORDER — LIDOCAINE HYDROCHLORIDE 20 MG/ML
INJECTION, SOLUTION INTRAVENOUS PRN
Status: DISCONTINUED | OUTPATIENT
Start: 2024-08-21 | End: 2024-08-21 | Stop reason: SDUPTHER

## 2024-08-21 RX ORDER — POTASSIUM CHLORIDE 7.45 MG/ML
10 INJECTION INTRAVENOUS PRN
Status: DISCONTINUED | OUTPATIENT
Start: 2024-08-21 | End: 2024-08-24 | Stop reason: HOSPADM

## 2024-08-21 RX ORDER — CELECOXIB 200 MG/1
200 CAPSULE ORAL 2 TIMES DAILY
Status: DISCONTINUED | OUTPATIENT
Start: 2024-08-23 | End: 2024-08-24 | Stop reason: HOSPADM

## 2024-08-21 RX ORDER — KETOROLAC TROMETHAMINE 30 MG/ML
15 INJECTION, SOLUTION INTRAMUSCULAR; INTRAVENOUS EVERY 6 HOURS
Status: DISPENSED | OUTPATIENT
Start: 2024-08-21 | End: 2024-08-23

## 2024-08-21 RX ORDER — ROCURONIUM BROMIDE 10 MG/ML
INJECTION, SOLUTION INTRAVENOUS PRN
Status: DISCONTINUED | OUTPATIENT
Start: 2024-08-21 | End: 2024-08-21 | Stop reason: SDUPTHER

## 2024-08-21 RX ORDER — SODIUM CHLORIDE, SODIUM LACTATE, POTASSIUM CHLORIDE, CALCIUM CHLORIDE 600; 310; 30; 20 MG/100ML; MG/100ML; MG/100ML; MG/100ML
INJECTION, SOLUTION INTRAVENOUS CONTINUOUS
Status: DISCONTINUED | OUTPATIENT
Start: 2024-08-21 | End: 2024-08-23

## 2024-08-21 RX ORDER — MIDAZOLAM HYDROCHLORIDE 1 MG/ML
INJECTION INTRAMUSCULAR; INTRAVENOUS PRN
Status: DISCONTINUED | OUTPATIENT
Start: 2024-08-21 | End: 2024-08-21 | Stop reason: SDUPTHER

## 2024-08-21 RX ORDER — TRAMADOL HYDROCHLORIDE 50 MG/1
50 TABLET ORAL EVERY 6 HOURS PRN
Status: DISCONTINUED | OUTPATIENT
Start: 2024-08-21 | End: 2024-08-24 | Stop reason: HOSPADM

## 2024-08-21 RX ORDER — MAGNESIUM SULFATE IN WATER 40 MG/ML
2000 INJECTION, SOLUTION INTRAVENOUS PRN
Status: DISCONTINUED | OUTPATIENT
Start: 2024-08-21 | End: 2024-08-24 | Stop reason: HOSPADM

## 2024-08-21 RX ORDER — ACETAMINOPHEN 500 MG
1000 TABLET ORAL EVERY 6 HOURS SCHEDULED
Status: DISCONTINUED | OUTPATIENT
Start: 2024-08-21 | End: 2024-08-23

## 2024-08-21 RX ORDER — SODIUM CHLORIDE 9 MG/ML
INJECTION, SOLUTION INTRAVENOUS PRN
Status: DISCONTINUED | OUTPATIENT
Start: 2024-08-21 | End: 2024-08-21 | Stop reason: HOSPADM

## 2024-08-21 RX ORDER — ONDANSETRON 2 MG/ML
INJECTION INTRAMUSCULAR; INTRAVENOUS PRN
Status: DISCONTINUED | OUTPATIENT
Start: 2024-08-21 | End: 2024-08-21 | Stop reason: SDUPTHER

## 2024-08-21 RX ORDER — SODIUM CHLORIDE 0.9 % (FLUSH) 0.9 %
5-40 SYRINGE (ML) INJECTION PRN
Status: DISCONTINUED | OUTPATIENT
Start: 2024-08-21 | End: 2024-08-24 | Stop reason: HOSPADM

## 2024-08-21 RX ORDER — POLYETHYLENE GLYCOL 3350 17 G/17G
17 POWDER, FOR SOLUTION ORAL DAILY
Status: DISCONTINUED | OUTPATIENT
Start: 2024-08-21 | End: 2024-08-24 | Stop reason: HOSPADM

## 2024-08-21 RX ORDER — HYDRALAZINE HYDROCHLORIDE 20 MG/ML
10 INJECTION INTRAMUSCULAR; INTRAVENOUS
Status: DISCONTINUED | OUTPATIENT
Start: 2024-08-21 | End: 2024-08-21 | Stop reason: HOSPADM

## 2024-08-21 RX ORDER — DROPERIDOL 2.5 MG/ML
0.62 INJECTION, SOLUTION INTRAMUSCULAR; INTRAVENOUS
Status: DISCONTINUED | OUTPATIENT
Start: 2024-08-21 | End: 2024-08-21 | Stop reason: HOSPADM

## 2024-08-21 RX ORDER — ALBUTEROL SULFATE 90 UG/1
2 AEROSOL, METERED RESPIRATORY (INHALATION) EVERY 6 HOURS PRN
Status: DISCONTINUED | OUTPATIENT
Start: 2024-08-21 | End: 2024-08-24 | Stop reason: HOSPADM

## 2024-08-21 RX ORDER — FENTANYL CITRATE 50 UG/ML
INJECTION, SOLUTION INTRAMUSCULAR; INTRAVENOUS PRN
Status: DISCONTINUED | OUTPATIENT
Start: 2024-08-21 | End: 2024-08-21 | Stop reason: SDUPTHER

## 2024-08-21 RX ORDER — PROPOFOL 10 MG/ML
INJECTION, EMULSION INTRAVENOUS PRN
Status: DISCONTINUED | OUTPATIENT
Start: 2024-08-21 | End: 2024-08-21 | Stop reason: SDUPTHER

## 2024-08-21 RX ORDER — SODIUM CHLORIDE, SODIUM LACTATE, POTASSIUM CHLORIDE, CALCIUM CHLORIDE 600; 310; 30; 20 MG/100ML; MG/100ML; MG/100ML; MG/100ML
INJECTION, SOLUTION INTRAVENOUS CONTINUOUS PRN
Status: DISCONTINUED | OUTPATIENT
Start: 2024-08-21 | End: 2024-08-21 | Stop reason: SDUPTHER

## 2024-08-21 RX ORDER — KETOROLAC TROMETHAMINE 30 MG/ML
INJECTION, SOLUTION INTRAMUSCULAR; INTRAVENOUS
Status: DISPENSED
Start: 2024-08-21 | End: 2024-08-22

## 2024-08-21 RX ORDER — FAMOTIDINE 20 MG/1
20 TABLET, FILM COATED ORAL 2 TIMES DAILY
Status: DISCONTINUED | OUTPATIENT
Start: 2024-08-21 | End: 2024-08-24 | Stop reason: HOSPADM

## 2024-08-21 RX ORDER — NALOXONE HYDROCHLORIDE 0.4 MG/ML
INJECTION, SOLUTION INTRAMUSCULAR; INTRAVENOUS; SUBCUTANEOUS PRN
Status: DISCONTINUED | OUTPATIENT
Start: 2024-08-21 | End: 2024-08-21 | Stop reason: HOSPADM

## 2024-08-21 RX ORDER — ONDANSETRON 2 MG/ML
4 INJECTION INTRAMUSCULAR; INTRAVENOUS
Status: DISCONTINUED | OUTPATIENT
Start: 2024-08-21 | End: 2024-08-21 | Stop reason: HOSPADM

## 2024-08-21 RX ORDER — LABETALOL HYDROCHLORIDE 5 MG/ML
10 INJECTION, SOLUTION INTRAVENOUS
Status: DISCONTINUED | OUTPATIENT
Start: 2024-08-21 | End: 2024-08-21 | Stop reason: HOSPADM

## 2024-08-21 RX ORDER — BUPIVACAINE HYDROCHLORIDE 5 MG/ML
INJECTION, SOLUTION PERINEURAL
Status: COMPLETED | OUTPATIENT
Start: 2024-08-21 | End: 2024-08-21

## 2024-08-21 RX ORDER — KETAMINE HYDROCHLORIDE 10 MG/ML
INJECTION, SOLUTION INTRAMUSCULAR; INTRAVENOUS PRN
Status: DISCONTINUED | OUTPATIENT
Start: 2024-08-21 | End: 2024-08-21 | Stop reason: SDUPTHER

## 2024-08-21 RX ORDER — DEXAMETHASONE SODIUM PHOSPHATE 4 MG/ML
INJECTION, SOLUTION INTRA-ARTICULAR; INTRALESIONAL; INTRAMUSCULAR; INTRAVENOUS; SOFT TISSUE PRN
Status: DISCONTINUED | OUTPATIENT
Start: 2024-08-21 | End: 2024-08-21 | Stop reason: SDUPTHER

## 2024-08-21 RX ORDER — SODIUM CHLORIDE 9 MG/ML
INJECTION, SOLUTION INTRAVENOUS PRN
Status: DISCONTINUED | OUTPATIENT
Start: 2024-08-21 | End: 2024-08-24 | Stop reason: HOSPADM

## 2024-08-21 RX ORDER — MEPERIDINE HYDROCHLORIDE 25 MG/ML
12.5 INJECTION INTRAMUSCULAR; INTRAVENOUS; SUBCUTANEOUS EVERY 5 MIN PRN
Status: DISCONTINUED | OUTPATIENT
Start: 2024-08-21 | End: 2024-08-21 | Stop reason: HOSPADM

## 2024-08-21 RX ORDER — POTASSIUM CHLORIDE 1500 MG/1
40 TABLET, EXTENDED RELEASE ORAL PRN
Status: DISCONTINUED | OUTPATIENT
Start: 2024-08-21 | End: 2024-08-24 | Stop reason: HOSPADM

## 2024-08-21 RX ORDER — ONDANSETRON 2 MG/ML
4 INJECTION INTRAMUSCULAR; INTRAVENOUS EVERY 6 HOURS PRN
Status: DISCONTINUED | OUTPATIENT
Start: 2024-08-21 | End: 2024-08-24 | Stop reason: HOSPADM

## 2024-08-21 RX ORDER — ENOXAPARIN SODIUM 100 MG/ML
30 INJECTION SUBCUTANEOUS DAILY
Status: DISCONTINUED | OUTPATIENT
Start: 2024-08-21 | End: 2024-08-23

## 2024-08-21 RX ORDER — GINSENG 100 MG
CAPSULE ORAL DAILY
Status: DISCONTINUED | OUTPATIENT
Start: 2024-08-21 | End: 2024-08-24 | Stop reason: HOSPADM

## 2024-08-21 RX ADMIN — FENTANYL CITRATE 50 MCG: 50 INJECTION, SOLUTION INTRAMUSCULAR; INTRAVENOUS at 11:23

## 2024-08-21 RX ADMIN — ONDANSETRON 4 MG: 2 INJECTION INTRAMUSCULAR; INTRAVENOUS at 19:07

## 2024-08-21 RX ADMIN — MIDAZOLAM 2 MG: 1 INJECTION INTRAMUSCULAR; INTRAVENOUS at 11:13

## 2024-08-21 RX ADMIN — FENTANYL CITRATE 50 MCG: 50 INJECTION, SOLUTION INTRAMUSCULAR; INTRAVENOUS at 12:02

## 2024-08-21 RX ADMIN — SODIUM CHLORIDE, POTASSIUM CHLORIDE, SODIUM LACTATE AND CALCIUM CHLORIDE: 600; 310; 30; 20 INJECTION, SOLUTION INTRAVENOUS at 13:50

## 2024-08-21 RX ADMIN — KETAMINE HYDROCHLORIDE 25 MG: 10 INJECTION INTRAMUSCULAR; INTRAVENOUS at 12:05

## 2024-08-21 RX ADMIN — ROCURONIUM BROMIDE 10 MG: 10 INJECTION, SOLUTION INTRAVENOUS at 12:03

## 2024-08-21 RX ADMIN — HYDROMORPHONE HYDROCHLORIDE 0.5 MG: 1 INJECTION, SOLUTION INTRAMUSCULAR; INTRAVENOUS; SUBCUTANEOUS at 12:12

## 2024-08-21 RX ADMIN — FAMOTIDINE 20 MG: 10 INJECTION, SOLUTION INTRAVENOUS at 20:21

## 2024-08-21 RX ADMIN — CEFAZOLIN 2000 MG: 2 INJECTION, POWDER, FOR SOLUTION INTRAMUSCULAR; INTRAVENOUS at 11:56

## 2024-08-21 RX ADMIN — SODIUM CHLORIDE, PRESERVATIVE FREE 10 ML: 5 INJECTION INTRAVENOUS at 20:23

## 2024-08-21 RX ADMIN — LIDOCAINE HYDROCHLORIDE 80 MG: 20 INJECTION, SOLUTION INTRAVENOUS at 11:23

## 2024-08-21 RX ADMIN — KETOROLAC TROMETHAMINE 15 MG: 30 INJECTION, SOLUTION INTRAMUSCULAR; INTRAVENOUS at 20:22

## 2024-08-21 RX ADMIN — ONDANSETRON 4 MG: 2 INJECTION INTRAMUSCULAR; INTRAVENOUS at 12:45

## 2024-08-21 RX ADMIN — TRAMADOL HYDROCHLORIDE 50 MG: 50 TABLET ORAL at 18:18

## 2024-08-21 RX ADMIN — DEXAMETHASONE SODIUM PHOSPHATE 4 MG: 4 INJECTION, SOLUTION INTRAMUSCULAR; INTRAVENOUS at 11:23

## 2024-08-21 RX ADMIN — PROPOFOL 110 MG: 10 INJECTION, EMULSION INTRAVENOUS at 11:23

## 2024-08-21 RX ADMIN — FAMOTIDINE 20 MG: 10 INJECTION, SOLUTION INTRAVENOUS at 14:47

## 2024-08-21 RX ADMIN — KETOROLAC TROMETHAMINE 15 MG: 30 INJECTION, SOLUTION INTRAMUSCULAR; INTRAVENOUS at 13:46

## 2024-08-21 RX ADMIN — POLYETHYLENE GLYCOL (3350) 17 G: 17 POWDER, FOR SOLUTION ORAL at 14:47

## 2024-08-21 RX ADMIN — SUGAMMADEX 200 MG: 100 INJECTION, SOLUTION INTRAVENOUS at 12:48

## 2024-08-21 RX ADMIN — HYDROMORPHONE HYDROCHLORIDE 0.5 MG: 1 INJECTION, SOLUTION INTRAMUSCULAR; INTRAVENOUS; SUBCUTANEOUS at 13:47

## 2024-08-21 RX ADMIN — GABAPENTIN 300 MG: 300 CAPSULE ORAL at 18:18

## 2024-08-21 RX ADMIN — ROCURONIUM BROMIDE 40 MG: 10 INJECTION, SOLUTION INTRAVENOUS at 11:23

## 2024-08-21 RX ADMIN — SODIUM CHLORIDE, POTASSIUM CHLORIDE, SODIUM LACTATE AND CALCIUM CHLORIDE: 600; 310; 30; 20 INJECTION, SOLUTION INTRAVENOUS at 11:13

## 2024-08-21 ASSESSMENT — PAIN SCALES - GENERAL
PAINLEVEL_OUTOF10: 8
PAINLEVEL_OUTOF10: 0
PAINLEVEL_OUTOF10: 6
PAINLEVEL_OUTOF10: 10
PAINLEVEL_OUTOF10: 0
PAINLEVEL_OUTOF10: 0
PAINLEVEL_OUTOF10: 10
PAINLEVEL_OUTOF10: 0
PAINLEVEL_OUTOF10: 0

## 2024-08-21 ASSESSMENT — PAIN DESCRIPTION - FREQUENCY
FREQUENCY: CONTINUOUS
FREQUENCY: CONTINUOUS

## 2024-08-21 ASSESSMENT — PAIN DESCRIPTION - LOCATION
LOCATION: INCISION
LOCATION: CHEST
LOCATION: INCISION
LOCATION: INCISION

## 2024-08-21 ASSESSMENT — PAIN - FUNCTIONAL ASSESSMENT

## 2024-08-21 ASSESSMENT — PAIN DESCRIPTION - ORIENTATION
ORIENTATION: RIGHT

## 2024-08-21 ASSESSMENT — PAIN DESCRIPTION - ONSET
ONSET: ON-GOING
ONSET: ON-GOING

## 2024-08-21 ASSESSMENT — PAIN DESCRIPTION - DESCRIPTORS
DESCRIPTORS: ACHING;DISCOMFORT
DESCRIPTORS: SHARP
DESCRIPTORS: ACHING;BURNING
DESCRIPTORS: ACHING;DISCOMFORT

## 2024-08-21 ASSESSMENT — PAIN DESCRIPTION - PAIN TYPE
TYPE: ACUTE PAIN;SURGICAL PAIN
TYPE: SURGICAL PAIN

## 2024-08-21 NOTE — PROGRESS NOTES
Called lab to run urine for pregnancy test.    Gave patient chlorhexidene wipes and new gown and linen to prep for surgery.

## 2024-08-21 NOTE — PROGRESS NOTES
Cardiothoracic Surgery  IN-PATIENT SERVICE   [facility]    Daily Progress Note            Date:   8/21/2024  Patient name:  Clarisse Arriaga  Date of admission:  8/18/2024  8:12 PM  MRN:   [id number]  Account:  [id number]  YOB: 1990  PCP:    No primary care provider on file.  Room:   [address] [place] [address]  Code Status:    Full Code    Physician Requesting Consult: Josemanuel Bowers MD    Reason for Consult: Recurrent pneumothorax    Chief Complaint:     Shortness of breath and chest pressure    History of Present Illness:     Patient is a 34-year-old female with a past medical history significant for spontaneous right tension pneumothorax in November 2017, history of VATS in November 2023.  She presented to the emergency department with complaints of right-sided chest pain that has been progressing over the past few days.  She admits to shortness of breath.  She denies any fever or cough.  Denies any trauma or injuries.  States it all started when she was sitting doing nothing.  Denies any fever, chills, nausea, vomiting, headache, lightheadedness, dizziness, or palpitations.  Chest x-ray was obtained that revealed right tension pneumothorax.  Right pigtail catheter was placed in the emergency department and repeat chest x-ray showed resolution of the pneumothorax.  CT surgery team was consulted to assess the patient in regards to chest tube management and possible surgical intervention.  She is currently on room air.  She is comfortable.  Chest x-ray this morning showed increase in the size of pneumothorax.  Chest tube in place with airleak noted on the Pleur-evac.  Chest tube was not to suction when I went into the room.  I placed it to suction.  I zip tied all connections to the chest tube.  We repeated CXR and Ct chest. Patient will most likely require surgical intervention.     Plan for surgery later today.      Past Medical History:     Past Medical  49.3 36 - 66 %    Lymphocytes % 39.7 24 - 44 %    Monocytes % 8.0 (H) 0 - 4 %    Eosinophils % 1.9 0 - 3 %    Basophils % 0.8 0 - 1 %    Neutrophils Absolute 3.1 K/CU MM    Lymphocytes Absolute 2.5 K/CU MM    Monocytes Absolute 0.5 K/CU MM    Eosinophils Absolute 0.1 K/CU MM    Basophils Absolute 0.1 K/CU MM    Nucleated RBC % 0.0 %    Total Nucleated RBC 0.0 K/CU MM    Total Immature Neutrophil 0.02 K/CU MM    Immature Neutrophil % 0.3 0 - 0.43 %   Basic Metabolic Panel    Collection Time: 08/21/24  6:26 AM   Result Value Ref Range    Sodium 138 135 - 145 MMOL/L    Potassium 4.1 3.5 - 5.1 MMOL/L    Chloride 103 99 - 110 mMol/L    CO2 26 21 - 32 MMOL/L    Anion Gap 9 7 - 16    Glucose 86 70 - 99 MG/DL    BUN 10 6 - 23 MG/DL    Creatinine 0.8 0.6 - 1.1 MG/DL    Est, Glom Filt Rate >90 >60 mL/min/1.73m2    Calcium 8.5 8.3 - 10.6 MG/DL     CT CHEST     INDICATION: Preop lung surgery     Multiple 2D axial images were obtained through the chest without IV contrast.   Radiation dose reduction techniques were used for this study:  All CT scans  performed at this facility use one or all of the following: Automated exposure  control, adjustment of the mA and/or kVp according to patient's size, iterative  reconstruction.     COMPARISON: None     FINDINGS:  - LUNGS: Mild left apical pneumothorax with left anterior chest tube.  - MEDIASTINUM/AXILLA: No significant adenopathy.     - HEART/VESSELS: Normal.  - CHEST WALL/BONES: Normal.  - UPPER ABDOMEN: No acute findings.      IMPRESSION:  Mild left apical pneumothorax with left anterior chest tube        Assessment :      Recurrent right spontaneous pneumothorax  History of right spontaneous pneumothorax in 11/20/2017  History of left spontaneous pneumothorax status post VATS in 11/20/2023  GERD    Plan:     -Pigtail chest tube to suction at all times.  Patient needs to ambulate on suction.  -Repeat CT chest reviewed  -Plan for surgery later today  -NPO today   -Further

## 2024-08-21 NOTE — CARE COORDINATION
CM reviewed pt's medical chart. CM introduced self and the role of case management. CM in to see pt to discuss discharge planning. Pt is from home with parents and daughter. PTA pt was independent with mobility and ADLs. At this time, pt denies needing HHC at discharge stating, \"my parents will be able to help me.\" Pt denies having any needs at discharge. Case Management will continue to follow.

## 2024-08-21 NOTE — PROGRESS NOTES
1306- pt received from OR. Monitors placed and alarms on. Report received from Bharath CONTEH. Pt alert and oriented x 4 upon arrival to PACU. Denies any pain or nausea.  1315- pt resting comfortably. Denies any pain or nausea.  1335- pt transported to CVICU and bedside report given to ROBYN Bonilla.

## 2024-08-21 NOTE — PROGRESS NOTES
V2.0    [facility] [place] Progress Note      Name:  Clarisse Arriaga /Age/Sex: 1990  (34 y.o. female)   MRN & CSN:  [id number] & [id number] Encounter Date/Time: 2024 11:55 AM EDT   Location:  -A PCP: No primary care provider on file.     Attending:Josemanuel Bowers MD       Hospital Day: 4    Assessment and Recommendations   Clarisse Arriaga is a 34 y.o. femalewho presents with Spontaneous pneumothorax    -had VATS today      # Recurrent right spontaneous pneumothorax status post RIGHT VATS THORACOSCOPY WITH BLEB RESECTION WITH TALC PLEURODESIS on 2024  -Doing well postop  -Chest tube in place  # Hx of right spontaneous tension pneumothorax in 2017  # Hx of left spontaneous pneumothorax s/p VATS in 2023  - Endorsed right-sided chest pain over past few days that suddenly started while resting, no trauma or injuries. Has mild SOB. No fevers or cough.    -Initial CXR showed large right pneumothorax. Had right pigtail catheter placed in ED with repeat CXR showing resolution of pneumothorax.        # GERD  - Continue PPI.    # Bradycardia  -Likely physiologic given age.  Only at night.  Asymptomatic and hemodynamically stable.  Continue to monitor.    Family history of pneumothorax    Diet ADULT DIET; Clear Liquid; 4 carb choices (60 gm/meal)   DVT Prophylaxis [] Lovenox, []  Heparin, [] SCDs, [x] Ambulation,  [] Eliquis, [] Xarelto  [] Coumadin   Code Status Full Code   Disposition From: Home  Expected Disposition: Home  Estimated Date of Discharge: 2-3 days  Patient requires continued admission due to pnuemothorax   Surrogate Decision Maker/ Christa Davila (Parent)          Subjective:     Chief Complaint: Right-sided chest pain    Was sitting up in bed after surgery.  Appeared comfortable.  Mother was at the bedside.    Review of Systems:      Pertinent positives and negatives discussed in HPI    Objective:     Intake/Output Summary (Last 24 hours) at 2024 1923  Last  Minimal lingular infiltrate is noted. Pleural spaces: There is a 10% right pneumothorax. The pneumothorax is increased in size since prior study on August 18, 2024. Surgical clips are noted along the medial left superior mediastinum /Mediastinum: The aorta is atherosclerotic. Left ventricular prominence is present. Bones/Joints: Mild degenerative changes are noted of the glenohumeral joints and thoracic spine. Free air: None Tubes lines devices: Chest tube projects over the right mid hemithorax.     There is a 10% right pneumothorax. The pneumothorax is increased in size since prior study on August 18, 2024. Surgical clips are noted along the medial left superior mediastinum Minimal lingular infiltrates noted. Reported to  Nurse GIUSEPPE Gross  at 915am  on 8/19, Electronically signed by Barbie Hodges MD    XR CHEST PORTABLE    Result Date: 8/18/2024  EXAMINATION:  CHEST RADIOGRAPH (PORTABLE SINGLE VIEW AP) Exam Date/Time:  8/18/2024 4:32 PM Clinical History:  chest pain Comparison: 8/18/2024  RESULT: Lines, tubes, and devices: Catheter overlying the right chest wall. Lungs and pleura:  No focal consolidation. No pneumothorax. No pleural effusion. Cardiomediastinal silhouette:  Stable cardiomediastinal silhouette.  Other: No acute osseous process.      No acute cardiopulmonary abnormality. Electronically signed by Kendell Fountain    XR CHEST (2 VW)    Result Date: 8/18/2024  Chest X-ray INDICATION: Chest pain COMPARISON: 12/7/2023 TECHNIQUE: PA and lateral views of the chest were obtained. FINDINGS: The lungs are clear. There are no infiltrates or effusions. There is a large right-sided pneumothorax with slight shift of the mediastinum to the left consistent with a tension pneumothorax. The heart size is normal.  The bony thorax is intact.      Large right tension pneumothorax. Laura, the patient's nurse, was notified on 8/18/2024 at 1600. Electronically signed by Harry Dominguez      CBC:   Recent Labs      08/20/24  1552 08/21/24  0626 08/21/24  1400   WBC 5.9 6.4 8.6   HGB 13.3 12.2* 13.8    215 239     BMP:    Recent Labs     08/20/24  1552 08/21/24  0626 08/21/24  1400    138 138   K 3.9 4.1 4.2   CL 98* 103 102   CO2 28 26 25   BUN 7 10 7   CREATININE 0.7 0.8 0.8   GLUCOSE 81 86 136*     Hepatic:   Recent Labs     08/19/24  0145   AST 10*   ALT 5*   BILITOT 0.5   ALKPHOS 46     Lipids:   Lab Results   Component Value Date/Time    CHOL 99 11/09/2017 09:17 PM    HDL 51 10/27/2020 02:44 PM    TRIG 58 11/09/2017 09:17 PM     Hemoglobin A1C:   Lab Results   Component Value Date/Time    LABA1C 4.3 11/09/2017 09:17 PM     TSH:   Lab Results   Component Value Date/Time    TSH 4.18 10/27/2020 02:44 PM     Troponin:   Lab Results   Component Value Date/Time    TROPONINT <0.010 01/06/2016 04:35 PM       UA:  Lab Results   Component Value Date/Time    NITRU POSITIVE 11/16/2023 09:03 AM    NITRU NEGATIVE 03/02/2012 03:20 PM    COLORU YELLOW 11/16/2023 09:03 AM    PHUR 6.5 11/16/2023 09:03 AM    WBCUA 1 11/16/2023 09:03 AM    RBCUA 12 11/16/2023 09:03 AM    MUCUS RARE 11/16/2023 09:03 AM    TRICHOMONAS NONE SEEN 11/16/2023 09:03 AM    BACTERIA MANY 11/16/2023 09:03 AM    CLARITYU SLIGHTLY CLOUDY 11/16/2023 09:03 AM    LEUKOCYTESUR SMALL NUMBER OR AMOUNT OBSERVED 11/16/2023 09:03 AM    UROBILINOGEN 0.2 11/16/2023 09:03 AM    BILIRUBINUR NEGATIVE 11/16/2023 09:03 AM    BLOODU LARGE NUMBER OR AMOUNT OBSERVED 11/16/2023 09:03 AM    GLUCOSEU NEGATIVE 11/16/2023 09:03 AM    GLUCOSEU NEGATIVE 03/02/2012 03:20 PM    KETUA NEGATIVE 11/16/2023 09:03 AM           Electronically signed by JOHNATHAN RODRIGUEZ MD on 8/21/2024 at 7:23 PM

## 2024-08-21 NOTE — ANESTHESIA POSTPROCEDURE EVALUATION
Department of Anesthesiology  Postprocedure Note    Patient: Clarisse Arriaga  MRN: [id number]  YOB: 1990  Date of evaluation: 8/21/2024    Procedure Summary       Date: 08/21/24 Room / Location: [address]    Anesthesia Start: 1113 Anesthesia Stop: 1310    Procedure: RIGHT VATS THORACOSCOPY WITH BLEB RESECTION WITH TALC PLEURODESIS (Right: Chest) Diagnosis:       Recurrent pneumothorax      (Recurrent pneumothorax [J93.9])    Surgeons: George Vigil MD Responsible Provider: Wayne Burciaga MD    Anesthesia Type: general ASA Status: 2            Anesthesia Type: No value filed.    Claude Phase I: Claude Score: 10    Claude Phase II:      Anesthesia Post Evaluation    Patient location during evaluation: PACU  Patient participation: complete - patient participated  Level of consciousness: sleepy but conscious  Pain score: 0  Airway patency: patent  Nausea & Vomiting: no vomiting and no nausea  Cardiovascular status: blood pressure returned to baseline and hemodynamically stable  Respiratory status: acceptable, spontaneous ventilation, nonlabored ventilation and nasal cannula  Hydration status: stable  Pain management: adequate    No notable events documented.

## 2024-08-21 NOTE — PROGRESS NOTES
V2.0    [facility] [place] [facility] Progress Note      Name:  Clarisse Arriaga /Age/Sex: 1990  (34 y.o. female)   MRN & CSN:  [id number] & [id number] Encounter Date/Time: 2024 11:55 AM EDT   Location:  [facility]-A PCP: No primary care provider on file.     Attending:Josemanuel Bowers MD       Hospital Day: 3    Assessment and Recommendations   Clarisse Arriaga is a 34 y.o. femalewho presents with Spontaneous pneumothorax      -right VATS with bleb resection planned tomorrow at 11 AM, along with talc pleurodesis    # Recurrent right spontaneous pneumothorax   # Hx of right spontaneous tension pneumothorax in 2017  # Hx of left spontaneous pneumothorax s/p VATS in 2023  - Endorsed right-sided chest pain over past few days that suddenly started while resting, no trauma or injuries. Has mild SOB. No fevers or cough.    - Hemodynamically stable. Initial CXR showed large right pneumothorax. Had right pigtail catheter placed in ED with repeat CXR showing resolution of pneumothorax.        # GERD  - Continue PPI.    # Bradycardia  -Likely physiologic given age.  Only at night.  Asymptomatic and hemodynamically stable.  Continue to monitor.    Family history of pneumothorax    Diet ADULT DIET; Regular  Diet NPO Exceptions are: Sips of Water with Meds   DVT Prophylaxis [] Lovenox, []  Heparin, [] SCDs, [x] Ambulation,  [] Eliquis, [] Xarelto  [] Coumadin   Code Status Full Code   Disposition From: Home  Expected Disposition: Home  Estimated Date of Discharge: 2-3 days  Patient requires continued admission due to pnuemothorax   Surrogate Decision Maker/ Christa Davila (Parent)          Subjective:     Chief Complaint: Right-sided chest pain    Complains of painful left arm, the nurse is aware and is about to remove    Review of Systems:      Pertinent positives and negatives discussed in HPI    Objective:     Intake/Output Summary (Last 24 hours) at 2024  Last data filed at 2024 0600  Gross

## 2024-08-21 NOTE — BRIEF OP NOTE
Brief Postoperative Note      Patient: Clarisse Arriaga  YOB: 1990  MRN: [id number]    Date of Procedure: 8/21/2024    Pre-Op Diagnosis Codes:      * Recurrent pneumothorax [J93.9]    Post-Op Diagnosis: Same       Procedure(s):  RIGHT VATS THORACOSCOPY WITH BLEB RESECTION WITH TALC PLEURODESIS    Surgeon(s):  George Vigil MD    Assistant:  Surgical Assistant: Irene Yun    Anesthesia: General    Estimated Blood Loss (mL): Minimal    Complications: None    Specimens:   ID Type Source Tests Collected by Time Destination   A : right upper lung lobe bleb resection Tissue Lung SURGICAL PATHOLOGY George Vigil MD 8/21/2024 1227        Implants:  * No implants in log *      Drains:   Chest Tube Right Pleural 2 (Active)       Chest Tube Right Pleural 1 (Active)       [REMOVED] Chest Tube Right Fourth intercostal space 1 (Removed)   $ Chest tube insertion $ Yes 08/18/24 1628   Chest Tube Airleak Yes 08/21/24 0831   Status Continuous Suction 08/20/24 0530   Suction -20 cm H2O 08/20/24 0530   Y Connector Used No 08/21/24 0831   Drainage Description Serosanguinous 08/21/24 0831   Dressing Status Clean, dry & intact 08/21/24 0831   Chest Tube Dressing Other (Comment) 08/20/24 0530   Site Assessment Clean, dry & intact 08/21/24 0831   Surrounding Skin Clean, dry & intact 08/21/24 0831   Output (ml) 2 ml 08/20/24 0530       Findings:  Infection Present At Time Of Surgery (PATOS) (choose all levels that have infection present):  No infection present  Other Findings: none    Electronically signed by Jerod Julien PA-C on 8/21/2024 at 1:27 PM

## 2024-08-21 NOTE — OP NOTE
Operative Note      Patient: Clarisse Arriaga  YOB: 1990  MRN: [id number]    Date of Procedure: 8/21/2024    Pre-Op Diagnosis Codes:      * Recurrent pneumothorax [J93.9] on the right    Post-Op Diagnosis: Same       Procedure(s):  RIGHT VATS THORACOSCOPY WITH BLEB RESECTION WITH TALC PLEURODESIS    Indication: Ms. Arriaga recently had a left-sided pneumothorax and required a VATS blebectomy at that time from which she recovered well.  She now presented with her third right-sided recurrent pneumothorax and the decision was made to move forward with a video-assisted bleb resection.  She had multiple plaques on her preoperative CT scan.  The risks and benefits including the risk of a recurrent pneumothorax very detailed explained to the patient who agreed to proceed.    Surgeon(s):  George Vigil MD    Assistant:   Surgical Assistant: Irene Yun. Our first assistant performed all necessary activities including exposure, first assistant role and assisting with the closure throughout the entire procedure.       Anesthesia: General    Estimated Blood Loss (mL): Minimal    Complications: None    Specimens:   ID Type Source Tests Collected by Time Destination   A : right upper lung lobe bleb resection Tissue Lung SURGICAL PATHOLOGY George Vigil MD 8/21/2024 1227        Implants:  * No implants in log *      Drains:   Chest Tube Right Pleural (Active)       [REMOVED] Chest Tube Right Fourth intercostal space 1 (Removed)   $ Chest tube insertion $ Yes 08/18/24 1628   Chest Tube Airleak Yes 08/21/24 0831   Status Continuous Suction 08/20/24 0530   Suction -20 cm H2O 08/20/24 0530   Y Connector Used No 08/21/24 0831   Drainage Description Serosanguinous 08/21/24 0831   Dressing Status Clean, dry & intact 08/21/24 0831   Chest Tube Dressing Other (Comment) 08/20/24 0530   Site Assessment Clean, dry & intact 08/21/24 0831   Surrounding Skin Clean, dry & intact 08/21/24 0831   Output  (ml) 2 ml 08/20/24 0530       Findings:  Infection Present At Time Of Surgery (PATOS) (choose all levels that have infection present):  No infection present  Other Findings: The patient had several flareups at the right upper lobe apical and inferior segment.  We were able to resect this with 2 staple lines.  The first staple we used was pushed off when activated the blade mechanism of the [place] stapler and caused an injury and tore the lung tissue without firing any staples.  We then used a new stapler and fired to 2 loads..  On testing the lung there was no further air leak after that.  An extensive mechanical abrasion of the pleura with a scratch pad as well as talc pleurodesis was performed at the end of the case.  At the end of the case there was a very minimal air leak.    Detailed Description of Procedure:   After full informed consent was obtained patient was brought to the operating room.  There she was positioned in supine position and after general anesthesia and endotracheal intubation was successfully accomplished she was positioned for a right thoracotomy on a beanbag and then was prepped and draped in the usual sterile fashion using DuraPrep solution.  A fullstop surgical timeout was performed.  The insertion site for the 3 trocars were carefully marked.  Subsequently we placed a three 11 mm trocars and the right upper lobe was carefully inspected.  Our first assistant performed all necessary activities including exposure, first assistant role and assisting with the closure throughout the entire procedure.   We then inserted the [place] stapler after I grasped the area of the prolapse with a [place] and I tried to advance it and ultimately got it nicely seated squeezed it and we waited for about 20 seconds and then I pushed the blade mechanism button but this just pushed the blue staple cartridge cephalad and at the heel there was a tear in the lung tissue.  Subsequently a new staple device was  opened and I again placed it slightly more towards the fissure trying to include the torn area in the staple line.  2 staple lines were then placed.  The specimen was removed with a specimen bag and sent for pathology.  Subsequently we irrigated the chest and filled the apex with water and started ventilating the lung.  There was no air leak visible.  At this point a mechanical abrasion pleurodesis was performed with a scratch pad and then the talc pleurodesis was performed.  A intact anterior 28 [place] Canvas chest tube and a posterior Canvas chest tube were placed.  Lung ventilation was started and the chest tubes were placed to suction.  After the initial air was evacuated there was very minimal air leak while she was intubated which then subsequently subsided once she was extubated.   She subsequently transferred to the ICU recovery in stable condition.There was no air leak when she arrived in the recovery area with her chest tube to suction.    Intraoperative data:  Procedure was performed in CTR #7, or time was 12:07 PM until 12:41 PM, estimated blood loss minimal, local anesthesia used: 15 cc of 0.5% Marcaine, IV fluids 800 cc.  Specimen:  The right upper lobe wedge placement with the bullae was sent for pathology.      Electronically signed by George Vigil MD on 8/21/2024 at 12:47 PM

## 2024-08-21 NOTE — PROGRESS NOTES
4 Eyes Skin Assessment     NAME:  Clarisse Arriaga  YOB: 1990  MEDICAL RECORD NUMBER:  [id number]    The patient is being assessed for  Admission    I agree that at least one RN has performed a thorough Head to Toe Skin Assessment on the patient. ALL assessment sites listed below have been assessed.      Areas assessed by both nurses:    Head, Face, Ears, Shoulders, Back, Chest, Arms, Elbows, Hands, Sacrum. Buttock, Coccyx, Ischium, Legs. Feet and Heels, and Under Medical Devices         Does the Patient have a Wound? No noted wound(s)       Jose Guadalupe Prevention initiated by RN: No  Wound Care Orders initiated by RN: No    Pressure Injury (Stage 3,4, Unstageable, DTI, NWPT, and Complex wounds) if present, place Wound referral order by RN under : No    New Ostomies, if present place, Ostomy referral order under : No     Nurse 1 eSignature: Electronically signed by Irene Bull RN on 8/21/24 at 2:46 PM EDT    **SHARE this note so that the co-signing nurse can place an eSignature**    Nurse 2 eSignature: Electronically signed by Merary Martínez RN on 8/21/24 at 3:42 PM EDT

## 2024-08-21 NOTE — PROGRESS NOTES
Patient arrived to CVICU at 1325 and connected to monitor. Chest tubes x2 assessed and connected to suction. No air leak present. R chest incision dressing clean, dry, and intact. Labs collected. Assessment completed, see documentation. Care plan ongoing. Vital signs stable. All needs met at this time.

## 2024-08-22 ENCOUNTER — APPOINTMENT (OUTPATIENT)
Dept: GENERAL RADIOLOGY | Age: 34
End: 2024-08-22
Attending: STUDENT IN AN ORGANIZED HEALTH CARE EDUCATION/TRAINING PROGRAM
Payer: MEDICAID

## 2024-08-22 LAB
ANION GAP SERPL CALCULATED.3IONS-SCNC: 7 MMOL/L (ref 7–16)
BUN SERPL-MCNC: 11 MG/DL (ref 6–23)
CALCIUM SERPL-MCNC: 8.8 MG/DL (ref 8.3–10.6)
CHLORIDE BLD-SCNC: 101 MMOL/L (ref 99–110)
CO2: 28 MMOL/L (ref 21–32)
CREAT SERPL-MCNC: 0.8 MG/DL (ref 0.6–1.1)
GFR, ESTIMATED: >90 ML/MIN/1.73M2
GLUCOSE SERPL-MCNC: 101 MG/DL (ref 70–99)
HCT VFR BLD CALC: 34.2 % (ref 37–47)
HEMOGLOBIN: 11.6 GM/DL (ref 12.5–16)
MAGNESIUM: 1.9 MG/DL (ref 1.8–2.4)
MCH RBC QN AUTO: 30.9 PG (ref 27–31)
MCHC RBC AUTO-ENTMCNC: 33.9 % (ref 32–36)
MCV RBC AUTO: 91 FL (ref 78–100)
PDW BLD-RTO: 11.4 % (ref 11.7–14.9)
PHOSPHORUS: 4 MG/DL (ref 2.5–4.9)
PLATELET # BLD: 217 K/CU MM (ref 140–440)
PMV BLD AUTO: 9.8 FL (ref 7.5–11.1)
POTASSIUM SERPL-SCNC: 4.4 MMOL/L (ref 3.5–5.1)
RBC # BLD: 3.76 M/CU MM (ref 4.2–5.4)
SODIUM BLD-SCNC: 136 MMOL/L (ref 135–145)
WBC # BLD: 12 K/CU MM (ref 4–10.5)

## 2024-08-22 PROCEDURE — 6370000000 HC RX 637 (ALT 250 FOR IP): Performed by: PHYSICIAN ASSISTANT

## 2024-08-22 PROCEDURE — 97535 SELF CARE MNGMENT TRAINING: CPT

## 2024-08-22 PROCEDURE — 80048 BASIC METABOLIC PNL TOTAL CA: CPT

## 2024-08-22 PROCEDURE — 84100 ASSAY OF PHOSPHORUS: CPT

## 2024-08-22 PROCEDURE — 97116 GAIT TRAINING THERAPY: CPT

## 2024-08-22 PROCEDURE — 85027 COMPLETE CBC AUTOMATED: CPT

## 2024-08-22 PROCEDURE — 94150 VITAL CAPACITY TEST: CPT

## 2024-08-22 PROCEDURE — 97166 OT EVAL MOD COMPLEX 45 MIN: CPT

## 2024-08-22 PROCEDURE — 71045 X-RAY EXAM CHEST 1 VIEW: CPT

## 2024-08-22 PROCEDURE — 83735 ASSAY OF MAGNESIUM: CPT

## 2024-08-22 PROCEDURE — 6360000002 HC RX W HCPCS: Performed by: PHYSICIAN ASSISTANT

## 2024-08-22 PROCEDURE — 97162 PT EVAL MOD COMPLEX 30 MIN: CPT

## 2024-08-22 PROCEDURE — 94761 N-INVAS EAR/PLS OXIMETRY MLT: CPT

## 2024-08-22 PROCEDURE — 2580000003 HC RX 258: Performed by: PHYSICIAN ASSISTANT

## 2024-08-22 PROCEDURE — 2100000000 HC CCU R&B

## 2024-08-22 PROCEDURE — 2060000000 HC ICU INTERMEDIATE R&B

## 2024-08-22 RX ORDER — GUAIFENESIN 600 MG/1
1200 TABLET, EXTENDED RELEASE ORAL 2 TIMES DAILY
Status: DISCONTINUED | OUTPATIENT
Start: 2024-08-22 | End: 2024-08-24 | Stop reason: HOSPADM

## 2024-08-22 RX ADMIN — KETOROLAC TROMETHAMINE 15 MG: 30 INJECTION, SOLUTION INTRAMUSCULAR; INTRAVENOUS at 20:34

## 2024-08-22 RX ADMIN — SODIUM CHLORIDE, PRESERVATIVE FREE 10 ML: 5 INJECTION INTRAVENOUS at 20:35

## 2024-08-22 RX ADMIN — FAMOTIDINE 20 MG: 20 TABLET ORAL at 20:34

## 2024-08-22 RX ADMIN — ONDANSETRON 4 MG: 2 INJECTION INTRAMUSCULAR; INTRAVENOUS at 10:59

## 2024-08-22 RX ADMIN — TRAMADOL HYDROCHLORIDE 50 MG: 50 TABLET ORAL at 17:43

## 2024-08-22 RX ADMIN — POLYETHYLENE GLYCOL (3350) 17 G: 17 POWDER, FOR SOLUTION ORAL at 08:16

## 2024-08-22 RX ADMIN — GABAPENTIN 300 MG: 300 CAPSULE ORAL at 17:43

## 2024-08-22 RX ADMIN — GABAPENTIN 300 MG: 300 CAPSULE ORAL at 08:16

## 2024-08-22 RX ADMIN — HYDROMORPHONE HYDROCHLORIDE 0.5 MG: 1 INJECTION, SOLUTION INTRAMUSCULAR; INTRAVENOUS; SUBCUTANEOUS at 03:46

## 2024-08-22 RX ADMIN — KETOROLAC TROMETHAMINE 15 MG: 30 INJECTION, SOLUTION INTRAMUSCULAR; INTRAVENOUS at 08:16

## 2024-08-22 RX ADMIN — TRAMADOL HYDROCHLORIDE 50 MG: 50 TABLET ORAL at 03:08

## 2024-08-22 RX ADMIN — SODIUM CHLORIDE, POTASSIUM CHLORIDE, SODIUM LACTATE AND CALCIUM CHLORIDE: 600; 310; 30; 20 INJECTION, SOLUTION INTRAVENOUS at 08:15

## 2024-08-22 RX ADMIN — KETOROLAC TROMETHAMINE 15 MG: 30 INJECTION, SOLUTION INTRAMUSCULAR; INTRAVENOUS at 01:46

## 2024-08-22 RX ADMIN — TRAMADOL HYDROCHLORIDE 50 MG: 50 TABLET ORAL at 10:28

## 2024-08-22 RX ADMIN — GABAPENTIN 300 MG: 300 CAPSULE ORAL at 03:08

## 2024-08-22 RX ADMIN — SODIUM CHLORIDE, PRESERVATIVE FREE 10 ML: 5 INJECTION INTRAVENOUS at 08:17

## 2024-08-22 RX ADMIN — ONDANSETRON 4 MG: 2 INJECTION INTRAMUSCULAR; INTRAVENOUS at 01:47

## 2024-08-22 RX ADMIN — FAMOTIDINE 20 MG: 20 TABLET ORAL at 08:16

## 2024-08-22 ASSESSMENT — PAIN DESCRIPTION - ORIENTATION
ORIENTATION: RIGHT

## 2024-08-22 ASSESSMENT — PAIN DESCRIPTION - LOCATION
LOCATION: INCISION
LOCATION: INCISION;RIB CAGE
LOCATION: BACK
LOCATION: CHEST
LOCATION: CHEST
LOCATION: BACK

## 2024-08-22 ASSESSMENT — PAIN DESCRIPTION - DESCRIPTORS
DESCRIPTORS: ACHING
DESCRIPTORS: ACHING;SORE
DESCRIPTORS: ACHING
DESCRIPTORS: ACHING

## 2024-08-22 ASSESSMENT — PAIN SCALES - GENERAL
PAINLEVEL_OUTOF10: 0
PAINLEVEL_OUTOF10: 6
PAINLEVEL_OUTOF10: 6
PAINLEVEL_OUTOF10: 0
PAINLEVEL_OUTOF10: 4
PAINLEVEL_OUTOF10: 6
PAINLEVEL_OUTOF10: 8
PAINLEVEL_OUTOF10: 0
PAINLEVEL_OUTOF10: 6
PAINLEVEL_OUTOF10: 0
PAINLEVEL_OUTOF10: 0
PAINLEVEL_OUTOF10: 6
PAINLEVEL_OUTOF10: 0

## 2024-08-22 ASSESSMENT — PAIN DESCRIPTION - PAIN TYPE
TYPE: SURGICAL PAIN
TYPE: SURGICAL PAIN

## 2024-08-22 ASSESSMENT — PAIN - FUNCTIONAL ASSESSMENT
PAIN_FUNCTIONAL_ASSESSMENT: ACTIVITIES ARE NOT PREVENTED
PAIN_FUNCTIONAL_ASSESSMENT: ACTIVITIES ARE NOT PREVENTED
PAIN_FUNCTIONAL_ASSESSMENT: PREVENTS OR INTERFERES SOME ACTIVE ACTIVITIES AND ADLS
PAIN_FUNCTIONAL_ASSESSMENT: PREVENTS OR INTERFERES SOME ACTIVE ACTIVITIES AND ADLS

## 2024-08-22 ASSESSMENT — PAIN DESCRIPTION - ONSET
ONSET: ON-GOING
ONSET: ON-GOING

## 2024-08-22 ASSESSMENT — PAIN DESCRIPTION - FREQUENCY
FREQUENCY: CONTINUOUS
FREQUENCY: CONTINUOUS

## 2024-08-22 NOTE — CONSULTS
[facility] ACUTE CARE PHYSICAL THERAPY EVALUATION  Clarisse Arriaga, 1990, 2004/2004-A, 8/22/2024    History  [place]:  The encounter diagnosis was Recurrent pneumothorax.  Patient  has a past medical history of GERD (gastroesophageal reflux disease), Hepatitis B, Liver failure (HCC), and Pneumothorax, spontaneous, tension.  Patient  has a past surgical history that includes Endometrial ablation; chest tube insertion (Right, 11/2017); Thoracoscopy (Left, 11/20/2023); and Thoracoscopy (Right, 8/21/2024).    Recommendation: home with support PRN     Subjective:  Patient states:  \"I hope this is the last time I have this problem\"   Pain:  discomfort near chest tubes site, did not rate   Communication with other providers:  RN, co-eval with Eddi JACKSON   Restrictions: General precautions, falls, chest tubes (continuous suction at this time), IV, portable tele, pulse ox, BP.     Home Setup/Prior level of function  Social/Functional History  Lives With: Parent, Daughter  Type of Home: House  Home Layout: One level  Home Access: Stairs to enter with rails (stair lift)  Entrance Stairs - Number of Steps: 3+  Entrance Stairs - Rails: Both  Bathroom Shower/Tub: Walk-in shower  Bathroom Toilet: Standard  Bathroom Equipment: Shower chair  Bathroom Accessibility: Accessible  ADL Assistance: Independent  Homemaking Assistance: Independent  Homemaking Responsibilities: Yes  Ambulation Assistance: Independent (uses no AD)  Transfer Assistance: Independent  Active : No (parents drive)  Occupation: Full time employment  Type of Occupation: UDF    Examination of body systems (includes body structures/functions, activity/participation limitations):  Observation:  Supine in bed upon arrival. Cooperative with therapy   Vision:  WFL  Hearing:  WFL  Cardiopulmonary:  stable vitals on room air   Orientation: WFL     Musculoskeletal  ROM R/L:  WFL BLEs  Strength R/L:  BLES grossly WFL in function and endurance.    Neuro:

## 2024-08-22 NOTE — CARE COORDINATION
CM reviewed notes. Pt has 2 right chest tube to suction. Pt lives with parents and daughter. Pt declined home care. LH

## 2024-08-22 NOTE — PROGRESS NOTES
Occupational Therapy    [facility] ACUTE CARE OCCUPATIONAL THERAPY EVALUATION    Clarisse Arriaga, 1990, 2004/2004-A, 8/22/2024    Discharge Recommendation: Home with initial 24 hour supervision/assistance    History:  [place]:  The encounter diagnosis was Recurrent pneumothorax.    Subjective:  Patient states: \"I need to get up and out of this bed.\"  Pain: Pt reported mild discomfort at chest tube site but did not quantify  Communication with other providers: PT ROBYN Velarde  Restrictions: General Precautions, Fall Risk, Chest Tube (x2, to continuous suction), IV, Telemetry, Pulse Ox, BP cuff, Bed/chair alarm    Home Setup/Prior level of function:  Social/Functional History  Lives With: Parent, Daughter  Type of Home: House  Home Layout: One level  Home Access: Stairs to enter with rails (stair lift)  Entrance Stairs - Number of Steps: 3+  Entrance Stairs - Rails: Both  Bathroom Shower/Tub: Walk-in shower  Bathroom Toilet: Standard  Bathroom Equipment: Shower chair  Bathroom Accessibility: Accessible  ADL Assistance: Independent  Homemaking Assistance: Independent  Homemaking Responsibilities: Yes  Ambulation Assistance: Independent (uses no AD)  Transfer Assistance: Independent  Active : No (parents drive)  Occupation: Full time employment  Type of Occupation: UDF    Examination:  Observation: Supine in bed upon arrival. Pleasant and agreeable to evaluation.  Vision: WFL  Hearing: WFL  Vitals: Stable vitals throughout session on room air    Body Systems and functions:  ROM: WFL all joints in BL UEs  Strength: WFL all major muscle groups BL UEs  Sensation: WFL (denies numbness/tingling)  Tone: Normal  Coordination: WNL  Perception: WNL    Activities of Daily Living (ADLs):  Feeding: Independent   Grooming: Supervision (completed hand hygiene in standing at sink)  UB bathing: Supervision  LB bathing: SBA (for safety with reaching bottom)  UB dressing: Supervision (donning clean robe  included UB/LB dressing tasks, toileting, hand hygiene at sink, education on modifying ADLs/IADLs and energy conservation strategies PRN at home    Safety Measures: Gait belt used, Left in Chair, Alarm in place    Assessment:  Pt is a 34 year old female with a past medical history of GERD (gastroesophageal reflux disease), Hepatitis B, Liver failure (HCC), and Pneumothorax, spontaneous, tension. Pt admitted with a recurrent Rt spontaneous pneumothorax and underwent Rt VATS on 8-21. Pt lives at home with her parents and daughter and at baseline is independent with ADLs, IADLs, mobility, and works full time at [employer]. Pt performed well this date, and from a self-care and mobility standpoint, is safe to return home at discharge with initial 24 hour support recommended.    Complexity: Moderate  Prognosis: Good  Plan: Eval and discharge; no further acute needs    Time:   Time in: 931  Time out: 951  Timed treatment minutes: 10  Total time: 20    Electronically signed by:    MARTHA Kaur/L, MOT, [id number]

## 2024-08-22 NOTE — PROGRESS NOTES
08/22/24 1501   Encounter Summary   Encounter Overview/Reason Attempted Encounter   Service Provided For Patient   Referral/Consult From Rounding   Support System Family members   Last Encounter  08/22/24  (Attempted encounter. PT was asleep. -Mario)   Assessment/Intervention/Outcome   Assessment Unable to assess   Plan and Referrals   Plan/Referrals Continue Support (comment)

## 2024-08-22 NOTE — PROGRESS NOTES
[facility] Cardiothoracic Surgery  Daily Progress Note    Surgeon:  Dr Timbo Razo      Subjective:  Ms. Arriaga is a 34 y.o. year-old female status post RIGHT VATS THORACOSCOPY WITH BLEB RESECTION WITH TALC PLEURODESIS  on 08/21/24.      Patient was seen and examined at bedside this morning without any complaints. Chest tube in place with small air leak with deep cough. Keep to suction.    Vital Signs: /62   Pulse 55   Temp 97.9 °F (36.6 °C) (Oral)   Resp 24   Ht 1.524 m (5')   Wt 52.6 kg (115 lb 15.4 oz)   SpO2 98%   BMI 22.65 kg/m²  O2 Flow Rate (L/min): 2 L/min   Admit Weight: Weight - Scale: 49.9 kg (110 lb)   WEIGHTWeight - Scale: 52.6 kg (115 lb 15.4 oz)     I/O's:  I/O last 3 completed shifts:  In: 1680.6 [I.V.:1680.6]  Out: 690 [Urine:525; Blood:10; Chest Tube:155]    Data:    CBC:   Recent Labs     08/21/24  0626 08/21/24  1400 08/22/24  0445   WBC 6.4 8.6 12.0*   HGB 12.2* 13.8 11.6*   HCT 36.0* 40.1 34.2*   MCV 92.8 91.3 91.0    239 217     BMP:   Recent Labs     08/21/24  0626 08/21/24  1400 08/22/24  0445    138 136   K 4.1 4.2 4.4    102 101   CO2 26 25 28   PHOS  --  3.6 4.0   BUN 10 7 11   CREATININE 0.8 0.8 0.8     PT/INR:   Recent Labs     08/20/24  1552 08/21/24  1400   PROTIME 13.8 13.6   INR 1.0 1.0     APTT:   Recent Labs     08/20/24  1552 08/21/24  1400   APTT 26.5 24.9*       Chest X-Ray 08/22/24  :   Chest X-ray     INDICATION:  pneumothorax with chest tube in place     COMPARISON: Chest x-ray of 8/21/2024.     TECHNIQUE: AP/PA view of the chest was obtained.        IMPRESSION:  2 right chest tubes remain in place, and interval decrease in a now smaller  (very small) right pneumothorax is seen.     The lungs otherwise appear clear.  The cardiomediastinal silhouette is stable, without evidence of cardiomegaly.      Scheduled Meds:    guaiFENesin  1,200 mg Oral BID    sodium chloride flush  5-40 mL IntraVENous 2 times  per day    bacitracin   Topical Daily    famotidine  20 mg Oral BID    Or    famotidine (PEPCID) injection  20 mg IntraVENous BID    enoxaparin  30 mg SubCUTAneous Daily    polyethylene glycol  17 g Oral Daily    gabapentin  300 mg Oral TID    ketorolac  15 mg IntraVENous Q6H    [START ON 8/23/2024] celecoxib  200 mg Oral BID    acetaminophen  1,000 mg Oral 4 times per day     Continuous Infusions:    lactated ringers IV soln 50 mL/hr at 08/22/24 0815    sodium chloride             Physical Exam:    General: A&O x 3, NAD noted  Heart: Normal S1, S2, RRR, No murmurs noted   Chest: incisions with dressing in place. Chest tubes to continuous suction -20cm, no airleak noted.  Lungs: Diminished BS bilaterally at the bases.   Abdomen: Soft, non tender, non distended, Hypoactive BS x 4   : Sandoval in place   Extremities: No edema noted bilateral LE.     : 155 cc/24hrs     Assessment:    Ms. Arriaga is a 34 y.o. year-old female status post RIGHT VATS THORACOSCOPY WITH BLEB RESECTION WITH TALC PLEURODESIS  on 08/21/24.        Plan:  Pain control utilizing ERAS protocol:   Scheduled Tylenol 1g Q6H  Gabapentin 300mg PO TID  Scheduled Toradol 15mg IV Q6H x 48 hours. Start Celebrex on POD3.   Tramadol 50mg PO Q6H PRN for moderate pain.  Chest tubes: continue to suction  Daily CXR  Advance diet   Discontinue IV Fluids   Mucinex     Level of Care: Step-down   Patient Medically Ready for Discharge? - No     The above recommendations including medications and orders were discussed and agreed upon with Dr. Yaritza Dickerson PA-C 08/22/24 8:24 AM        New Consults 8:00AM-4:30PM: Call Office, 4:30PM to 8:00AM Surgeon on-call    CVICU or other units patient follow up: Beulah author of this note 8:00AM-4:30PM    CVICU patient or urgent follow up: 4:30PM to 8:00AM Call or Page Surgeon on-call     Step-down patient follow up: 4:30PM to 8:00AM Page or secure chat PA on-call

## 2024-08-23 ENCOUNTER — APPOINTMENT (OUTPATIENT)
Dept: GENERAL RADIOLOGY | Age: 34
End: 2024-08-23
Attending: STUDENT IN AN ORGANIZED HEALTH CARE EDUCATION/TRAINING PROGRAM
Payer: MEDICAID

## 2024-08-23 LAB
ANION GAP SERPL CALCULATED.3IONS-SCNC: 3 MMOL/L (ref 7–16)
BUN SERPL-MCNC: 9 MG/DL (ref 6–23)
CALCIUM SERPL-MCNC: 8.4 MG/DL (ref 8.3–10.6)
CHLORIDE BLD-SCNC: 101 MMOL/L (ref 99–110)
CO2: 29 MMOL/L (ref 21–32)
CREAT SERPL-MCNC: 0.7 MG/DL (ref 0.6–1.1)
GFR, ESTIMATED: >90 ML/MIN/1.73M2
GLUCOSE SERPL-MCNC: 90 MG/DL (ref 70–99)
HCT VFR BLD CALC: 31.9 % (ref 37–47)
HEMOGLOBIN: 10.8 GM/DL (ref 12.5–16)
MAGNESIUM: 1.9 MG/DL (ref 1.8–2.4)
MCH RBC QN AUTO: 31.3 PG (ref 27–31)
MCHC RBC AUTO-ENTMCNC: 33.9 % (ref 32–36)
MCV RBC AUTO: 92.5 FL (ref 78–100)
PDW BLD-RTO: 11.5 % (ref 11.7–14.9)
PHOSPHORUS: 3 MG/DL (ref 2.5–4.9)
PLATELET # BLD: 178 K/CU MM (ref 140–440)
PMV BLD AUTO: 9.6 FL (ref 7.5–11.1)
POTASSIUM SERPL-SCNC: 4.7 MMOL/L (ref 3.5–5.1)
RBC # BLD: 3.45 M/CU MM (ref 4.2–5.4)
SODIUM BLD-SCNC: 133 MMOL/L (ref 135–145)
WBC # BLD: 6.7 K/CU MM (ref 4–10.5)

## 2024-08-23 PROCEDURE — 80048 BASIC METABOLIC PNL TOTAL CA: CPT

## 2024-08-23 PROCEDURE — 2060000000 HC ICU INTERMEDIATE R&B

## 2024-08-23 PROCEDURE — 94150 VITAL CAPACITY TEST: CPT

## 2024-08-23 PROCEDURE — 83735 ASSAY OF MAGNESIUM: CPT

## 2024-08-23 PROCEDURE — 2580000003 HC RX 258: Performed by: PHYSICIAN ASSISTANT

## 2024-08-23 PROCEDURE — 6360000002 HC RX W HCPCS: Performed by: PHYSICIAN ASSISTANT

## 2024-08-23 PROCEDURE — 71045 X-RAY EXAM CHEST 1 VIEW: CPT

## 2024-08-23 PROCEDURE — 6370000000 HC RX 637 (ALT 250 FOR IP): Performed by: PHYSICIAN ASSISTANT

## 2024-08-23 PROCEDURE — 85027 COMPLETE CBC AUTOMATED: CPT

## 2024-08-23 PROCEDURE — 94761 N-INVAS EAR/PLS OXIMETRY MLT: CPT

## 2024-08-23 PROCEDURE — 84100 ASSAY OF PHOSPHORUS: CPT

## 2024-08-23 RX ORDER — ENOXAPARIN SODIUM 100 MG/ML
40 INJECTION SUBCUTANEOUS DAILY
Status: DISCONTINUED | OUTPATIENT
Start: 2024-08-23 | End: 2024-08-23

## 2024-08-23 RX ADMIN — SODIUM CHLORIDE, POTASSIUM CHLORIDE, SODIUM LACTATE AND CALCIUM CHLORIDE: 600; 310; 30; 20 INJECTION, SOLUTION INTRAVENOUS at 02:06

## 2024-08-23 RX ADMIN — SODIUM CHLORIDE, PRESERVATIVE FREE 10 ML: 5 INJECTION INTRAVENOUS at 20:47

## 2024-08-23 RX ADMIN — FAMOTIDINE 20 MG: 20 TABLET ORAL at 20:46

## 2024-08-23 RX ADMIN — CELECOXIB 200 MG: 200 CAPSULE ORAL at 20:46

## 2024-08-23 RX ADMIN — TRAMADOL HYDROCHLORIDE 50 MG: 50 TABLET ORAL at 20:46

## 2024-08-23 RX ADMIN — BACITRACIN: 500 OINTMENT TOPICAL at 09:28

## 2024-08-23 RX ADMIN — KETOROLAC TROMETHAMINE 15 MG: 30 INJECTION, SOLUTION INTRAMUSCULAR; INTRAVENOUS at 09:16

## 2024-08-23 RX ADMIN — CELECOXIB 200 MG: 200 CAPSULE ORAL at 09:17

## 2024-08-23 RX ADMIN — GABAPENTIN 300 MG: 300 CAPSULE ORAL at 09:18

## 2024-08-23 RX ADMIN — GABAPENTIN 300 MG: 300 CAPSULE ORAL at 17:36

## 2024-08-23 RX ADMIN — GABAPENTIN 300 MG: 300 CAPSULE ORAL at 02:04

## 2024-08-23 RX ADMIN — FAMOTIDINE 20 MG: 20 TABLET ORAL at 09:18

## 2024-08-23 RX ADMIN — KETOROLAC TROMETHAMINE 15 MG: 30 INJECTION, SOLUTION INTRAMUSCULAR; INTRAVENOUS at 02:04

## 2024-08-23 RX ADMIN — TRAMADOL HYDROCHLORIDE 50 MG: 50 TABLET ORAL at 00:17

## 2024-08-23 ASSESSMENT — PAIN SCALES - GENERAL
PAINLEVEL_OUTOF10: 1
PAINLEVEL_OUTOF10: 0
PAINLEVEL_OUTOF10: 0
PAINLEVEL_OUTOF10: 6
PAINLEVEL_OUTOF10: 0
PAINLEVEL_OUTOF10: 4
PAINLEVEL_OUTOF10: 0
PAINLEVEL_OUTOF10: 6
PAINLEVEL_OUTOF10: 0
PAINLEVEL_OUTOF10: 0
PAINLEVEL_OUTOF10: 1
PAINLEVEL_OUTOF10: 0

## 2024-08-23 ASSESSMENT — PAIN DESCRIPTION - DESCRIPTORS
DESCRIPTORS: ACHING;SORE
DESCRIPTORS: ACHING
DESCRIPTORS: ACHING;DISCOMFORT;SORE

## 2024-08-23 ASSESSMENT — PAIN - FUNCTIONAL ASSESSMENT: PAIN_FUNCTIONAL_ASSESSMENT: ACTIVITIES ARE NOT PREVENTED

## 2024-08-23 ASSESSMENT — PAIN DESCRIPTION - ORIENTATION
ORIENTATION: RIGHT

## 2024-08-23 ASSESSMENT — PAIN DESCRIPTION - LOCATION
LOCATION: INCISION;RIB CAGE
LOCATION: INCISION;RIB CAGE
LOCATION: FLANK

## 2024-08-23 NOTE — PROGRESS NOTES
LOVENOX PROPHYLAXIS EVALUATION  (Populations not addressed in this protocol: trauma, obstetrics, or COVID-19)    Wt Readings from Last 3 Encounters:   08/23/24 53.9 kg (118 lb 13.3 oz)   08/18/24 50.1 kg (110 lb 6.4 oz)   11/28/23 47.8 kg (105 lb 6.4 oz)       Estimated Creatinine Clearance: 83 mL/min (based on SCr of 0.7 mg/dL).  Recent Labs     08/20/24  1552 08/21/24  0626 08/21/24  1400 08/22/24  0445 08/23/24  0610   BUN 7   < > 7 11 9   CREATININE 0.7   < > 0.8 0.8 0.7      < > 239 217 178   HGB 13.3   < > 13.8 11.6* 10.8*   HCT 39.3   < > 40.1 34.2* 31.9*   INR 1.0  --  1.0  --   --     < > = values in this interval not displayed.       Weight Range: .9 kg    CRCL = 30 or greater    50.9 kg   and below     .9  kg   101-150.9 kg   151-174.9  kg   175 kg  or greater     30mg subq  daily     40mg subq daily    (or 30mg subq BID orthopedic)     30mg subq  BID   40mg subq  BID   60mg subq BID       Per P/T protocol for appropriate subq anticoagulation by weight and CRCL change to:    Enoxparin 40mg subq daily      Thank you,  Alissa Howard [facility], PharmD.  8/23/2024 8:25 AM

## 2024-08-23 NOTE — PROGRESS NOTES
V2.[place]    [facility] [place]      Name:  Clarisse Arriaga /Age/Sex: 1990  (34 y.o. female)   MRN & CSN:  [id number] & [id number] Encounter Date/Time: 2024 11:55 AM EDT   Location:  -A PCP: No primary care provider on file.     Attending:Josemanuel Bowers MD       Hospital Day: 6    Assessment and Recommendations   Clarisse Arriaga is a 34 y.o. femalewho presents with Spontaneous pneumothorax    -one of the 2 chest tubes has been removed today, pain is under control-continue gabapentin    -had VATS today      # Recurrent right spontaneous pneumothorax status post RIGHT VATS THORACOSCOPY WITH BLEB RESECTION WITH TALC PLEURODESIS on 2024  -Doing well postop  -Chest tubes in place  -Tylenol 1000 mg 4 times a day  -Gabapentin 300 mg 3 times a day  -Toradol 15 mg IV every 6 hours for 2 days, followed by Celebrex on postop day #3  -Tramadol as needed for pain control  # Hx of right spontaneous tension pneumothorax in 2017  # Hx of left spontaneous pneumothorax s/p VATS in 2023  - Endorsed right-sided chest pain over past few days that suddenly started while resting, no trauma or injuries. Has mild SOB. No fevers or cough.    -Initial CXR showed large right pneumothorax. Had right pigtail catheter placed in ED with repeat CXR showing resolution of pneumothorax.        # GERD  - Continue PPI.    # Bradycardia  -Likely physiologic given age.  Only at night.  Asymptomatic and hemodynamically stable.  Continue to monitor.    Family history of pneumothorax    Diet ADULT DIET; Regular   DVT Prophylaxis [] Lovenox, []  Heparin, [] SCDs, [x] Ambulation,  [] Eliquis, [] Xarelto  [] Coumadin   Code Status Full Code   Disposition From: Home  Expected Disposition: Home  Estimated Date of Discharge: 2-3 days  Patient requires continued admission due to pnuemothorax   Surrogate Decision Maker/ Christa Davila (Parent)          Subjective:     Chief Complaint: Right-sided chest  slight shift of the mediastinum to the left consistent with a tension pneumothorax. The heart size is normal.  The bony thorax is intact.      Large right tension pneumothorax. Laura, the patient's nurse, was notified on 8/18/2024 at 1600. Electronically signed by Harry Dominguez      CBC:   Recent Labs     08/21/24  1400 08/22/24  0445 08/23/24  0610   WBC 8.6 12.0* 6.7   HGB 13.8 11.6* 10.8*    217 178     BMP:    Recent Labs     08/21/24  1400 08/22/24  0445 08/23/24  0610    136 133*   K 4.2 4.4 4.7    101 101   CO2 25 28 29   BUN 7 11 9   CREATININE 0.8 0.8 0.7   GLUCOSE 136* 101* 90     Hepatic:   No results for input(s): \"AST\", \"ALT\", \"BILITOT\", \"ALKPHOS\" in the last 72 hours.    Invalid input(s): \"ALB\"    Lipids:   Lab Results   Component Value Date/Time    CHOL 99 11/09/2017 09:17 PM    HDL 51 10/27/2020 02:44 PM    TRIG 58 11/09/2017 09:17 PM     Hemoglobin A1C:   Lab Results   Component Value Date/Time    LABA1C 4.3 11/09/2017 09:17 PM     TSH:   Lab Results   Component Value Date/Time    TSH 4.18 10/27/2020 02:44 PM     Troponin:   Lab Results   Component Value Date/Time    TROPONINT <0.010 01/06/2016 04:35 PM       UA:  Lab Results   Component Value Date/Time    NITRU POSITIVE 11/16/2023 09:03 AM    NITRU NEGATIVE 03/02/2012 03:20 PM    COLORU YELLOW 11/16/2023 09:03 AM    PHUR 6.5 11/16/2023 09:03 AM    WBCUA 1 11/16/2023 09:03 AM    RBCUA 12 11/16/2023 09:03 AM    MUCUS RARE 11/16/2023 09:03 AM    TRICHOMONAS NONE SEEN 11/16/2023 09:03 AM    BACTERIA MANY 11/16/2023 09:03 AM    CLARITYU SLIGHTLY CLOUDY 11/16/2023 09:03 AM    LEUKOCYTESUR SMALL NUMBER OR AMOUNT OBSERVED 11/16/2023 09:03 AM    UROBILINOGEN 0.2 11/16/2023 09:03 AM    BILIRUBINUR NEGATIVE 11/16/2023 09:03 AM    BLOODU LARGE NUMBER OR AMOUNT OBSERVED 11/16/2023 09:03 AM    GLUCOSEU NEGATIVE 11/16/2023 09:03 AM    GLUCOSEU NEGATIVE 03/02/2012 03:20 PM    KETUA NEGATIVE 11/16/2023 09:03 AM           Electronically signed  by JOHNATHAN RODRIGUEZ MD on 8/23/2024 at 8:58 AM

## 2024-08-23 NOTE — PROGRESS NOTES
V2.0    [facility] [place] Progress Note      Name:  Clarisse Arriaga /Age/Sex: 1990  (34 y.o. female)   MRN & CSN:  [id number] & [id number] Encounter Date/Time: 2024 11:55 AM EDT   Location:  -A PCP: No primary care provider on file.     Attending:Josemanuel Bowers MD       Hospital Day: 5    Assessment and Recommendations   Clarisse Arriaga is a 34 y.o. femalewho presents with Spontaneous pneumothorax    -had VATS today      # Recurrent right spontaneous pneumothorax status post RIGHT VATS THORACOSCOPY WITH BLEB RESECTION WITH TALC PLEURODESIS on 2024  -Doing well postop  -Chest tubes in place  -Tylenol 1000 mg 4 times a day  -Gabapentin 300 mg 3 times a day  -Toradol 15 mg IV every 6 hours for 2 days, followed by Celebrex on postop day #3  -Tramadol as needed for pain control  # Hx of right spontaneous tension pneumothorax in 2017  # Hx of left spontaneous pneumothorax s/p VATS in 2023  - Endorsed right-sided chest pain over past few days that suddenly started while resting, no trauma or injuries. Has mild SOB. No fevers or cough.    -Initial CXR showed large right pneumothorax. Had right pigtail catheter placed in ED with repeat CXR showing resolution of pneumothorax.        # GERD  - Continue PPI.    # Bradycardia  -Likely physiologic given age.  Only at night.  Asymptomatic and hemodynamically stable.  Continue to monitor.    Family history of pneumothorax    Diet ADULT DIET; Regular   DVT Prophylaxis [] Lovenox, []  Heparin, [] SCDs, [x] Ambulation,  [] Eliquis, [] Xarelto  [] Coumadin   Code Status Full Code   Disposition From: Home  Expected Disposition: Home  Estimated Date of Discharge: 2-3 days  Patient requires continued admission due to pnuemothorax   Surrogate Decision Maker/ Christa Davila (Parent)          Subjective:     Chief Complaint: Right-sided chest pain    She was in the recliner at the bedside when I saw her and resting comfortably  -2 chest tubes  Imaging   XR CHEST PORTABLE    Result Date: 8/19/2024  PROCEDURE: X-ray Chest 1 view Clinical Indication: Chest pain TECHNIQUE: AP view of the chest were performed. COMPARISON: CR chest: August 18, 2024 FINDINGS: Lungs: Minimal lingular infiltrate is noted. Pleural spaces: There is a 10% right pneumothorax. The pneumothorax is increased in size since prior study on August 18, 2024. Surgical clips are noted along the medial left superior mediastinum /Mediastinum: The aorta is atherosclerotic. Left ventricular prominence is present. Bones/Joints: Mild degenerative changes are noted of the glenohumeral joints and thoracic spine. Free air: None Tubes lines devices: Chest tube projects over the right mid hemithorax.     There is a 10% right pneumothorax. The pneumothorax is increased in size since prior study on August 18, 2024. Surgical clips are noted along the medial left superior mediastinum Minimal lingular infiltrates noted. Reported to  Nurse GIUSEPPE Gross  at 915am  on 8/19, Electronically signed by Barbie Hodges MD    XR CHEST PORTABLE    Result Date: 8/18/2024  EXAMINATION:  CHEST RADIOGRAPH (PORTABLE SINGLE VIEW AP) Exam Date/Time:  8/18/2024 4:32 PM Clinical History:  chest pain Comparison: 8/18/2024  RESULT: Lines, tubes, and devices: Catheter overlying the right chest wall. Lungs and pleura:  No focal consolidation. No pneumothorax. No pleural effusion. Cardiomediastinal silhouette:  Stable cardiomediastinal silhouette.  Other: No acute osseous process.      No acute cardiopulmonary abnormality. Electronically signed by Kendell Fountain    XR CHEST (2 VW)    Result Date: 8/18/2024  Chest X-ray INDICATION: Chest pain COMPARISON: 12/7/2023 TECHNIQUE: PA and lateral views of the chest were obtained. FINDINGS: The lungs are clear. There are no infiltrates or effusions. There is a large right-sided pneumothorax with slight shift of the mediastinum to the left consistent with a tension pneumothorax. The

## 2024-08-23 NOTE — PROGRESS NOTES
V2.0    [facility] [place]      Name:  Clarisse Arriaga /Age/Sex: 1990  (34 y.o. female)   MRN & CSN:  [id number] & [id number] Encounter Date/Time: 2024 11:55 AM EDT   Location:  -A PCP: No primary care provider on file.     Attending:Josemanuel Bowers MD       Hospital Day: 5    Assessment and Recommendations   Clarisse Arriaga is a 34 y.o. femalewho presents with Spontaneous pneumothorax      -right VATS with bleb resection planned tomorrow at 11 AM, along with talc pleurodesis    # Recurrent right spontaneous pneumothorax   # Hx of right spontaneous tension pneumothorax in 2017  # Hx of left spontaneous pneumothorax s/p VATS in 2023  - Endorsed right-sided chest pain over past few days that suddenly started while resting, no trauma or injuries. Has mild SOB. No fevers or cough.    - Hemodynamically stable. Initial CXR showed large right pneumothorax. Had right pigtail catheter placed in ED with repeat CXR showing resolution of pneumothorax.        # GERD  - Continue PPI.    # Bradycardia  -Likely physiologic given age.  Only at night.  Asymptomatic and hemodynamically stable.  Continue to monitor.    Family history of pneumothorax    Diet ADULT DIET; Regular   DVT Prophylaxis [] Lovenox, []  Heparin, [] SCDs, [x] Ambulation,  [] Eliquis, [] Xarelto  [] Coumadin   Code Status Full Code   Disposition From: Home  Expected Disposition: Home  Estimated Date of Discharge: 2-3 days  Patient requires continued admission due to pnuemothorax   Surrogate Decision Maker/ Christa Davila (Parent)          Subjective:     Chief Complaint: Right-sided chest pain    Complains of painful left arm, the nurse is aware and is about to remove    Review of Systems:      Pertinent positives and negatives discussed in HPI    Objective:     Intake/Output Summary (Last 24 hours) at 2024  Last data filed at 2024 1800  Gross per 24 hour   Intake 1260.6 ml   Output 1030 ml    Recent Labs     08/21/24  0626 08/21/24  1400 08/22/24  0445    138 136   K 4.1 4.2 4.4    102 101   CO2 26 25 28   BUN 10 7 11   CREATININE 0.8 0.8 0.8   GLUCOSE 86 136* 101*     Hepatic:   No results for input(s): \"AST\", \"ALT\", \"BILITOT\", \"ALKPHOS\" in the last 72 hours.    Invalid input(s): \"ALB\"    Lipids:   Lab Results   Component Value Date/Time    CHOL 99 11/09/2017 09:17 PM    HDL 51 10/27/2020 02:44 PM    TRIG 58 11/09/2017 09:17 PM     Hemoglobin A1C:   Lab Results   Component Value Date/Time    LABA1C 4.3 11/09/2017 09:17 PM     TSH:   Lab Results   Component Value Date/Time    TSH 4.18 10/27/2020 02:44 PM     Troponin:   Lab Results   Component Value Date/Time    TROPONINT <0.010 01/06/2016 04:35 PM       UA:  Lab Results   Component Value Date/Time    NITRU POSITIVE 11/16/2023 09:03 AM    NITRU NEGATIVE 03/02/2012 03:20 PM    COLORU YELLOW 11/16/2023 09:03 AM    PHUR 6.5 11/16/2023 09:03 AM    WBCUA 1 11/16/2023 09:03 AM    RBCUA 12 11/16/2023 09:03 AM    MUCUS RARE 11/16/2023 09:03 AM    TRICHOMONAS NONE SEEN 11/16/2023 09:03 AM    BACTERIA MANY 11/16/2023 09:03 AM    CLARITYU SLIGHTLY CLOUDY 11/16/2023 09:03 AM    LEUKOCYTESUR SMALL NUMBER OR AMOUNT OBSERVED 11/16/2023 09:03 AM    UROBILINOGEN 0.2 11/16/2023 09:03 AM    BILIRUBINUR NEGATIVE 11/16/2023 09:03 AM    BLOODU LARGE NUMBER OR AMOUNT OBSERVED 11/16/2023 09:03 AM    GLUCOSEU NEGATIVE 11/16/2023 09:03 AM    GLUCOSEU NEGATIVE 03/02/2012 03:20 PM    KETUA NEGATIVE 11/16/2023 09:03 AM           Electronically signed by JOHNATHAN RODRIGUEZ MD on 8/22/2024 at 8:43 PM

## 2024-08-23 NOTE — CARE COORDINATION
Follow up visit with pt. Pt up in chair. Chest tube remains in place. Spoke with CTS NP, Lyla, who advises that they anticipate to pull chest tube and hope for discharge home Sat 8/24. Pt states that he mother will transport home. Pt states that she does goes to [facility] for primary care but has not been there in over 1 year. Pt's ins will cover Rxs. Pt expresses no needs or concerns for discharge.

## 2024-08-23 NOTE — PROGRESS NOTES
[facility] Cardiothoracic Surgery  Daily Progress Note    Surgeon:  Dr. Timbo Razo      Subjective:  Ms. Arriaga is a 34 y.o. year-old female status post RIGHT VATS THORACOSCOPY WITH BLEB RESECTION WITH TALC PLEURODESIS  on 08/21/24.      Patient was seen and examined at bedside this morning without any complaints.  Removing posterior chest tube today.  Maintain anterior chest tube to suction.    Vital Signs: /80   Pulse 56   Temp 98.7 °F (37.1 °C) (Oral)   Resp 15   Ht 1.524 m (5')   Wt 53.9 kg (118 lb 13.3 oz)   SpO2 98%   BMI 23.21 kg/m²  O2 Flow Rate (L/min): 2 L/min   Admit Weight: Weight - Scale: 49.9 kg (110 lb)   WEIGHTWeight - Scale: 53.9 kg (118 lb 13.3 oz)     I/O's:  I/O last 3 completed shifts:  In: 2514.7 [P.O.:480; I.V.:2034.7]  Out: 1060 [Urine:925; Chest Tube:135]    Data:    CBC:   Recent Labs     08/21/24  1400 08/22/24  0445 08/23/24  0610   WBC 8.6 12.0* 6.7   HGB 13.8 11.6* 10.8*   HCT 40.1 34.2* 31.9*   MCV 91.3 91.0 92.5    217 178     BMP:   Recent Labs     08/21/24  1400 08/22/24  0445 08/23/24  0610    136 133*   K 4.2 4.4 4.7    101 101   CO2 25 28 29   PHOS 3.6 4.0 3.0   BUN 7 11 9   CREATININE 0.8 0.8 0.7     PT/INR:   Recent Labs     08/20/24  1552 08/21/24  1400   PROTIME 13.8 13.6   INR 1.0 1.0     APTT:   Recent Labs     08/20/24  1552 08/21/24  1400   APTT 26.5 24.9*       Chest X-Ray 08/23/24  :   EXAMINATION: XR CHEST PORTABLE     EXAM DATE: 8/23/2024 4:34 AM     INDICATION: postop lung surgery     COMPARISON: August 22, 2024     TECHNIQUE:  Single AP view of the chest.     FINDINGS:     The lungs are clear. A small suture line is noted at the right lung apex. No  pleural effusion or pneumothorax. 2 right-sided chest tubes are present.     The cardiomediastinal silhouette is unremarkable.     No acute osseous or soft tissue abnormality.     IMPRESSION:     1.  Postoperative changes to the right hemithorax

## 2024-08-23 NOTE — PROGRESS NOTES
Irene Bull  Patient:  MARIA DOLORES JAQUEZ   YOB: 1990  MRN:  [id number]  Location:  V  2004 - A  8/21/2024 2:53 PM  [phone]  From: Irene Bull  [facility]  ICU ROUTINE  RE: MARIA DOLORES JAQUEZ  Pt refusing lovenox injection  8/21/2024 2:53 PM  Yhsnkd  Read 8/21/2024 2:53 PM  8/21/2024 2:53 PM  Thanks  Read 8/21/202

## 2024-08-24 ENCOUNTER — APPOINTMENT (OUTPATIENT)
Dept: GENERAL RADIOLOGY | Age: 34
End: 2024-08-24
Attending: STUDENT IN AN ORGANIZED HEALTH CARE EDUCATION/TRAINING PROGRAM
Payer: MEDICAID

## 2024-08-24 VITALS
WEIGHT: 119.05 LBS | TEMPERATURE: 97.3 F | SYSTOLIC BLOOD PRESSURE: 147 MMHG | OXYGEN SATURATION: 98 % | HEIGHT: 60 IN | BODY MASS INDEX: 23.37 KG/M2 | DIASTOLIC BLOOD PRESSURE: 84 MMHG | RESPIRATION RATE: 20 BRPM | HEART RATE: 56 BPM

## 2024-08-24 LAB
ANION GAP SERPL CALCULATED.3IONS-SCNC: 6 MMOL/L (ref 7–16)
BUN SERPL-MCNC: 9 MG/DL (ref 6–23)
CALCIUM SERPL-MCNC: 8.4 MG/DL (ref 8.3–10.6)
CHLORIDE BLD-SCNC: 97 MMOL/L (ref 99–110)
CO2: 31 MMOL/L (ref 21–32)
CREAT SERPL-MCNC: 0.8 MG/DL (ref 0.6–1.1)
GFR, ESTIMATED: >90 ML/MIN/1.73M2
GLUCOSE SERPL-MCNC: 85 MG/DL (ref 70–99)
HCT VFR BLD CALC: 36.4 % (ref 37–47)
HEMOGLOBIN: 12.1 GM/DL (ref 12.5–16)
MAGNESIUM: 2.3 MG/DL (ref 1.8–2.4)
MCH RBC QN AUTO: 31.3 PG (ref 27–31)
MCHC RBC AUTO-ENTMCNC: 33.2 % (ref 32–36)
MCV RBC AUTO: 94.1 FL (ref 78–100)
PDW BLD-RTO: 11.5 % (ref 11.7–14.9)
PHOSPHORUS: 3.9 MG/DL (ref 2.5–4.9)
PLATELET # BLD: 224 K/CU MM (ref 140–440)
PMV BLD AUTO: 9.6 FL (ref 7.5–11.1)
POTASSIUM SERPL-SCNC: 4.6 MMOL/L (ref 3.5–5.1)
RBC # BLD: 3.87 M/CU MM (ref 4.2–5.4)
SODIUM BLD-SCNC: 134 MMOL/L (ref 135–145)
WBC # BLD: 6.2 K/CU MM (ref 4–10.5)

## 2024-08-24 PROCEDURE — 71045 X-RAY EXAM CHEST 1 VIEW: CPT

## 2024-08-24 PROCEDURE — 80048 BASIC METABOLIC PNL TOTAL CA: CPT

## 2024-08-24 PROCEDURE — 2580000003 HC RX 258: Performed by: PHYSICIAN ASSISTANT

## 2024-08-24 PROCEDURE — 94761 N-INVAS EAR/PLS OXIMETRY MLT: CPT

## 2024-08-24 PROCEDURE — 85027 COMPLETE CBC AUTOMATED: CPT

## 2024-08-24 PROCEDURE — 84100 ASSAY OF PHOSPHORUS: CPT

## 2024-08-24 PROCEDURE — 94150 VITAL CAPACITY TEST: CPT

## 2024-08-24 PROCEDURE — 83735 ASSAY OF MAGNESIUM: CPT

## 2024-08-24 PROCEDURE — 6370000000 HC RX 637 (ALT 250 FOR IP): Performed by: PHYSICIAN ASSISTANT

## 2024-08-24 RX ORDER — GABAPENTIN 300 MG/1
300 CAPSULE ORAL 3 TIMES DAILY
Qty: 21 CAPSULE | Refills: 0 | Status: SHIPPED | OUTPATIENT
Start: 2024-08-24 | End: 2024-08-31

## 2024-08-24 RX ORDER — FAMOTIDINE 20 MG/1
20 TABLET, FILM COATED ORAL 2 TIMES DAILY
Qty: 60 TABLET | Refills: 0 | Status: SHIPPED | OUTPATIENT
Start: 2024-08-24

## 2024-08-24 RX ORDER — TRAMADOL HYDROCHLORIDE 50 MG/1
50 TABLET ORAL EVERY 6 HOURS PRN
Qty: 12 TABLET | Refills: 0 | Status: SHIPPED | OUTPATIENT
Start: 2024-08-24 | End: 2024-08-27

## 2024-08-24 RX ORDER — ALBUTEROL SULFATE 90 UG/1
2 AEROSOL, METERED RESPIRATORY (INHALATION) EVERY 6 HOURS PRN
Qty: 18 G | Refills: 0 | Status: SHIPPED | OUTPATIENT
Start: 2024-08-24

## 2024-08-24 RX ORDER — CELECOXIB 200 MG/1
200 CAPSULE ORAL 2 TIMES DAILY
Qty: 20 CAPSULE | Refills: 0 | Status: SHIPPED | OUTPATIENT
Start: 2024-08-24 | End: 2024-09-03

## 2024-08-24 RX ADMIN — BACITRACIN: 500 OINTMENT TOPICAL at 08:34

## 2024-08-24 RX ADMIN — TRAMADOL HYDROCHLORIDE 50 MG: 50 TABLET ORAL at 15:28

## 2024-08-24 RX ADMIN — SODIUM CHLORIDE, PRESERVATIVE FREE 10 ML: 5 INJECTION INTRAVENOUS at 08:26

## 2024-08-24 RX ADMIN — TRAMADOL HYDROCHLORIDE 50 MG: 50 TABLET ORAL at 08:25

## 2024-08-24 RX ADMIN — GABAPENTIN 300 MG: 300 CAPSULE ORAL at 08:25

## 2024-08-24 RX ADMIN — FAMOTIDINE 20 MG: 20 TABLET ORAL at 08:25

## 2024-08-24 RX ADMIN — CELECOXIB 200 MG: 200 CAPSULE ORAL at 08:26

## 2024-08-24 ASSESSMENT — PAIN DESCRIPTION - FREQUENCY: FREQUENCY: CONTINUOUS

## 2024-08-24 ASSESSMENT — PAIN SCALES - GENERAL
PAINLEVEL_OUTOF10: 0
PAINLEVEL_OUTOF10: 6
PAINLEVEL_OUTOF10: 0
PAINLEVEL_OUTOF10: 5

## 2024-08-24 ASSESSMENT — PAIN DESCRIPTION - ORIENTATION
ORIENTATION: RIGHT
ORIENTATION: RIGHT

## 2024-08-24 ASSESSMENT — PAIN DESCRIPTION - LOCATION
LOCATION: INCISION
LOCATION: INCISION

## 2024-08-24 ASSESSMENT — PAIN DESCRIPTION - ONSET: ONSET: ON-GOING

## 2024-08-24 ASSESSMENT — PAIN DESCRIPTION - PAIN TYPE: TYPE: ACUTE PAIN;SURGICAL PAIN

## 2024-08-24 ASSESSMENT — PAIN DESCRIPTION - DESCRIPTORS
DESCRIPTORS: ACHING;DISCOMFORT
DESCRIPTORS: ACHING;DISCOMFORT

## 2024-08-24 NOTE — PROGRESS NOTES
[facility] [place] Cardiothoracic Surgery  Daily Progress Note    Surgeon:  Dr. Timbo Razo      Subjective:  Ms. Arriaga is a 34 y.o. year-old female status post RIGHT VATS THORACOSCOPY WITH BLEB RESECTION WITH TALC PLEURODESIS  on 08/21/24.      Patient was seen and examined at bedside this morning without any complaints    Vital Signs: /80   Pulse 61   Temp 97.4 °F (36.3 °C) (Oral)   Resp 13   Ht 1.524 m (5')   Wt 54 kg (119 lb 0.8 oz)   SpO2 100%   BMI 23.25 kg/m²  O2 Flow Rate (L/min): 0 L/min   Admit Weight: Weight - Scale: 49.9 kg (110 lb)   WEIGHTWeight - Scale: 54 kg (119 lb 0.8 oz)     I/O's:  I/O last 3 completed shifts:  In: 2574.1 [P.O.:1320; I.V.:1254.1]  Out: 2860 [Urine:2800; Chest Tube:60]    Data:    CBC:   Recent Labs     08/22/24  0445 08/23/24  0610 08/24/24  0630   WBC 12.0* 6.7 6.2   HGB 11.6* 10.8* 12.1*   HCT 34.2* 31.9* 36.4*   MCV 91.0 92.5 94.1    178 224     BMP:   Recent Labs     08/22/24  0445 08/23/24  0610 08/24/24  0630    133* 134*   K 4.4 4.7 4.6    101 97*   CO2 28 29 31   PHOS 4.0 3.0 3.9   BUN 11 9 9   CREATININE 0.8 0.7 0.8     PT/INR:   Recent Labs     08/21/24  1400   PROTIME 13.6   INR 1.0     APTT:   Recent Labs     08/21/24  1400   APTT 24.9*       Chest X-Ray 08/24/24  :   Narrative:        CHEST SINGLE VIEW      INDICATION: Postoperative lung surgery.    COMPARISON: August 23, 2024.     Impression:       FINDINGS/IMPRESSION:  A single AP view of the chest is submitted.  The heart is  normal in size. A single right-sided chest tube is noted with tip at the apex of  the right lung. The second right-sided chest tube has been removed.     No infiltrates or consolidation.  No pneumothorax or effusion.       Scheduled Meds:    guaiFENesin  1,200 mg Oral BID    sodium chloride flush  5-40 mL IntraVENous 2 times per day    bacitracin   Topical Daily    famotidine  20 mg Oral BID    Or    famotidine (PEPCID)  injection  20 mg IntraVENous BID    polyethylene glycol  17 g Oral Daily    gabapentin  300 mg Oral TID    celecoxib  200 mg Oral BID     Continuous Infusions:    sodium chloride             Physical Exam:    General: A&O x 3, NAD noted  Heart: Normal S1, S2, RRR, No murmurs noted   Chest: incisions with dressing in place. Chest tubes to continuous suction -20cm, no airleak noted.  Lungs: Diminished BS bilaterally at the bases.   Abdomen: Soft, non tender, non distended, Hypoactive BS x 4   : Sandoval removed  Extremities: No edema noted bilateral LE.     : 30 cc/24hrs     Assessment:    Ms. Arriaga is a 34 y.o. year-old female status post RIGHT VATS THORACOSCOPY WITH BLEB RESECTION WITH TALC PLEURODESIS  on 08/21/24.        Plan:  Pain control utilizing ERAS protocol:   Scheduled Tylenol 1g Q6H  Gabapentin 300mg PO TID  Scheduled Toradol 15mg IV Q6H x 48 hours. Start Celebrex on POD3.   Tramadol 50mg PO Q6H PRN for moderate pain.  Remove chest tube today. If no PTX on CXR then ok for DC    Level of Care: Step-down   Patient Medically Ready for Discharge? - No     The above recommendations including medications and orders were discussed and agreed upon with Dr. Yaritza Garcia PA-C 08/24/24 9:38 AM        New Consults 8:00AM-4:30PM: Call Office, 4:30PM to 8:00AM Surgeon on-call    CVICU or other units patient follow up: Beulah author of this note 8:00AM-4:30PM    CVICU patient or urgent follow up: 4:30PM to 8:00AM Call or Page Surgeon on-call     Step-down patient follow up: 4:30PM to 8:00AM Page or secure chat PA on-call

## 2024-08-24 NOTE — PLAN OF CARE
Problem: Discharge Planning  Goal: Discharge to home or other facility with appropriate resources  8/24/2024 1020 by Irene Bull RN  Outcome: Adequate for Discharge  8/23/2024 2125 by Aurelia Becker RN  Outcome: Progressing     Problem: Pain  Goal: Verbalizes/displays adequate comfort level or baseline comfort level  8/24/2024 1020 by Irene Bull RN  Outcome: Adequate for Discharge  8/23/2024 2125 by Aurelia Becker RN  Outcome: Progressing     Problem: Safety - Adult  Goal: Free from fall injury  8/24/2024 1020 by Irene Bull RN  Outcome: Adequate for Discharge  8/23/2024 2125 by Aurelai Becker RN  Outcome: Progressing

## 2024-08-24 NOTE — PROGRESS NOTES
Discharge instructions, incision care, medications, and follow-up appointments reviewed with pt. Pt verbalized understanding and all questions answered. IV removed and dressing applied. Pt discharged home with mother at this time.

## 2024-08-24 NOTE — DISCHARGE SUMMARY
V2.0  Discharge Summary    Name:  Clarisse Arriaga /Age/Sex: 1990 (34 y.o. female)   Admit Date: 2024  Discharge Date: 24    MRN & CSN:  [id number] & [id number] Encounter Date and Time 24 2:14 PM EDT    Attending:  Johnathan Rodriguez MD Discharging Provider: JOHNATHAN RODRIGUEZ MD       Hospital Course:     Brief HPI: Clarisse Arriaga is a 34 y.o. female who presented to [facility] ED with right-sided chest pain.  She has a history of recurrent pneumothoraces.      In [place] she was found to have a large right tension pneumothorax, and had a chest tube placed in [facility] ED, and was then transferred to [place], and was admitted.    Brief Problem Based Course:     Recurrent right pneumothorax, spontaneous,status post right VATS thoracoscopy with bleb resection with talc pleurodesis on 2024 by Dr. Vigil  -Pain control utilizing ERAS protocol resulted in good pain control postop as follows:  --- Tylenol 1 g every 6 hours - not given as she states she can't take it due to hx of liver failure  --- Gabapentin 300 mg 3 times a day - discharged with 1 week supply  --- Toradol scheduled 15 mg every 6 hours IV for 48 hours followed by Celebrex on postop day #3 - discharged with Celebrex  --- Tramadol 50 mg every 6 hours as needed for moderate pain - discharged with an rx  --- She had 2 chest tubes postop, and one of them was removed yesterday, and the other 1 was removed today, and I received a message from nursing that Dr. Vigil has cleared her for discharge today, so she was discharged in stable condition.She did very well postop.     Bullous disease, significant, of the right upper lobe with prior history of right tension pneumothorax in 2017  -Bleb resection done while she was here during this admission as above    History of left spontaneous pneumothorax status post VATS in 2023    GERD-uses famotidine as needed as outpatient    Bradycardia-only at

## 2024-08-24 NOTE — PLAN OF CARE
Problem: Discharge Planning  Goal: Discharge to home or other facility with appropriate resources  8/23/2024 2125 by Aurelia Becker RN  Outcome: Progressing  8/23/2024 1010 by Keisha Miller RN  Outcome: Progressing     Problem: Pain  Goal: Verbalizes/displays adequate comfort level or baseline comfort level  8/23/2024 2125 by Aurelia Becker RN  Outcome: Progressing  8/23/2024 1010 by Keisha Miller RN  Outcome: Progressing     Problem: Safety - Adult  Goal: Free from fall injury  8/23/2024 2125 by Aurelia Becker RN  Outcome: Progressing  8/23/2024 1010 by Keisha Miller RN  Outcome: Progressing

## 2024-08-25 DIAGNOSIS — J93.83 SPONTANEOUS PNEUMOTHORAX: Primary | ICD-10-CM

## 2024-08-30 ENCOUNTER — TELEPHONE (OUTPATIENT)
Dept: CARDIOTHORACIC SURGERY | Age: 34
End: 2024-08-30

## 2024-08-30 NOTE — TELEPHONE ENCOUNTER
FMLA paperwork completed. Faxed to the requested fax number with complication. Scanned into chart.

## 2024-08-30 NOTE — TELEPHONE ENCOUNTER
Patient dropped of [facility] paperwork to be completed. Patient had a Right VATS Thoracoscopy with bleb resection with talk pleurodesis on 8/21/2024 with Dr. Vigil. Patient is scheduled for her post-op with AUGUSTUS Rodriguez on 9/5. Will discuss with Marium.

## 2024-09-04 ENCOUNTER — HOSPITAL ENCOUNTER (OUTPATIENT)
Age: 34
Discharge: HOME | End: 2024-09-04
Payer: MEDICAID

## 2024-09-04 ENCOUNTER — HOSPITAL ENCOUNTER (OUTPATIENT)
Dept: GENERAL RADIOLOGY | Age: 34
Discharge: HOME | End: 2024-09-04
Payer: MEDICAID

## 2024-09-04 DIAGNOSIS — J93.83 SPONTANEOUS PNEUMOTHORAX: ICD-10-CM

## 2024-09-04 PROCEDURE — 71046 X-RAY EXAM CHEST 2 VIEWS: CPT

## 2024-09-05 ENCOUNTER — OFFICE VISIT (OUTPATIENT)
Dept: CARDIOTHORACIC SURGERY | Age: 34
End: 2024-09-05

## 2024-09-05 VITALS
HEART RATE: 70 BPM | BODY MASS INDEX: 21.65 KG/M2 | DIASTOLIC BLOOD PRESSURE: 60 MMHG | OXYGEN SATURATION: 99 % | WEIGHT: 110.25 LBS | HEIGHT: 60 IN | SYSTOLIC BLOOD PRESSURE: 94 MMHG

## 2024-09-05 DIAGNOSIS — J93.83 SPONTANEOUS PNEUMOTHORAX: Primary | ICD-10-CM

## 2024-09-05 DIAGNOSIS — F17.200 SMOKING: ICD-10-CM

## 2024-09-05 PROCEDURE — 99024 POSTOP FOLLOW-UP VISIT: CPT | Performed by: PHYSICIAN ASSISTANT

## 2024-09-05 NOTE — PROGRESS NOTES
9/5/2024    No primary care provider on file.   No primary provider on file.             Re: , Leelaparviz      Dear Dr. Stanley      I had the pleasure of seeing your patient, Clarisse Arriaga (34 y.o. female) in the office for follow up after her RIGHT VATS THORACOSCOPY WITH BLEB RESECTION WITH TALC PLEURODESIS on 8/21/24.    Current Medications    Current Outpatient Medications:     famotidine (PEPCID) 20 MG tablet, Take 1 tablet by mouth 2 times daily, Disp: 60 tablet, Rfl: 0    albuterol sulfate HFA (PROVENTIL;VENTOLIN;PROAIR) 108 (90 Base) MCG/ACT inhaler, Inhale 2 puffs into the lungs every 6 hours as needed for Wheezing or Shortness of Breath, Disp: 18 g, Rfl: 0    celecoxib (CELEBREX) 200 MG capsule, Take 1 capsule by mouth 2 times daily for 10 days, Disp: 20 capsule, Rfl: 0    gabapentin (NEURONTIN) 300 MG capsule, Take 1 capsule by mouth 3 times daily for 21 doses., Disp: 21 capsule, Rfl: 0    Physical Exam:  her vital signs are There were no vitals taken for this visit.     Appearance: alert, well appearing, and in no distress.  Neck: No JVD, no carotid bruits  CV: normal rate, regular rhythm, normal S1, S2, no murmurs, rubs, clicks or gallops  Resp: Chest: clear to auscultation, no wheezes, rales or rhonchi, symmetric air entry.  Sternotomy: clean, dry, healed  Leg wound: clean, dry, healed  Vascular: she has well perfused extremities.   Edema: she has no peripheral edema      Studies:    Chest X-ray     INDICATION: Chest tube     COMPARISON: X-ray chest August 24, 2024     TECHNIQUE: PA and lateral views of the chest were obtained.     FINDINGS: Stable right upper lung zone granuloma. There are no infiltrates or  effusions. Interval near resolution of right apical pneumothorax. The heart size  is normal.  The bony thorax is intact.       IMPRESSION:  Interval near resolution of right apical pneumothorax.      No consolidation.    Assessment/Plan:  Ms. Arriaga  is doing well postoperatively and

## 2024-10-20 ENCOUNTER — HOSPITAL ENCOUNTER (EMERGENCY)
Age: 34
Discharge: HOME | End: 2024-10-20
Attending: EMERGENCY MEDICINE
Payer: MEDICAID

## 2024-10-20 ENCOUNTER — APPOINTMENT (OUTPATIENT)
Dept: GENERAL RADIOLOGY | Age: 34
End: 2024-10-20
Payer: MEDICAID

## 2024-10-20 VITALS
DIASTOLIC BLOOD PRESSURE: 72 MMHG | BODY MASS INDEX: 20.81 KG/M2 | HEIGHT: 60 IN | HEART RATE: 63 BPM | TEMPERATURE: 98.4 F | OXYGEN SATURATION: 100 % | WEIGHT: 106 LBS | SYSTOLIC BLOOD PRESSURE: 111 MMHG | RESPIRATION RATE: 16 BRPM

## 2024-10-20 DIAGNOSIS — S62.339A CLOSED BOXER'S FRACTURE, INITIAL ENCOUNTER: Primary | ICD-10-CM

## 2024-10-20 PROCEDURE — 73130 X-RAY EXAM OF HAND: CPT

## 2024-10-20 PROCEDURE — 6370000000 HC RX 637 (ALT 250 FOR IP): Performed by: EMERGENCY MEDICINE

## 2024-10-20 PROCEDURE — 99283 EMERGENCY DEPT VISIT LOW MDM: CPT

## 2024-10-20 PROCEDURE — 29125 APPL SHORT ARM SPLINT STATIC: CPT

## 2024-10-20 RX ORDER — NAPROXEN 500 MG/1
500 TABLET ORAL ONCE
Status: COMPLETED | OUTPATIENT
Start: 2024-10-20 | End: 2024-10-20

## 2024-10-20 RX ORDER — ONDANSETRON 4 MG/1
4 TABLET, ORALLY DISINTEGRATING ORAL 3 TIMES DAILY PRN
Qty: 21 TABLET | Refills: 0 | Status: SHIPPED | OUTPATIENT
Start: 2024-10-20

## 2024-10-20 RX ORDER — NAPROXEN 500 MG/1
500 TABLET ORAL 2 TIMES DAILY
Qty: 60 TABLET | Refills: 0 | Status: SHIPPED | OUTPATIENT
Start: 2024-10-20

## 2024-10-20 RX ORDER — HYDROCODONE BITARTRATE AND ACETAMINOPHEN 5; 325 MG/1; MG/1
1-2 TABLET ORAL EVERY 6 HOURS PRN
Qty: 10 TABLET | Refills: 0 | Status: SHIPPED | OUTPATIENT
Start: 2024-10-20 | End: 2024-10-22

## 2024-10-20 RX ADMIN — NAPROXEN 500 MG: 500 TABLET ORAL at 17:35

## 2024-10-20 ASSESSMENT — PAIN DESCRIPTION - PAIN TYPE: TYPE: ACUTE PAIN

## 2024-10-20 ASSESSMENT — PAIN - FUNCTIONAL ASSESSMENT: PAIN_FUNCTIONAL_ASSESSMENT: 0-10

## 2024-10-20 ASSESSMENT — PAIN SCALES - GENERAL: PAINLEVEL_OUTOF10: 8

## 2024-10-20 ASSESSMENT — PAIN DESCRIPTION - ORIENTATION: ORIENTATION: RIGHT

## 2024-10-20 ASSESSMENT — PAIN DESCRIPTION - LOCATION: LOCATION: HAND

## 2024-10-20 NOTE — ED PROVIDER NOTES
[facility]      TRIAGE CHIEF COMPLAINT:   Hand Injury (right hand, states she climbed up to get something and fell, states she hit her hand on the counter. Denies punching anything)      [place]:  Clarisse Arriaga is a 34 y.o. female that presents with complaint of right hand pain.  Patient states that she is right-handed.  Last night she fell and hit her hand on the counter.  She slammed her fist down.  Has pain over the fourth and fifth metacarpals on the right hand.  She has her to see him before.  Denies being in a fight denies punching something.  No other injuries or questions or concerns denies being pregnant.  Has not had medication today..    REVIEW OF SYSTEMS:  At least 10 systems reviewed and otherwise acutely negative except as in the [place].    Review of Systems   Constitutional: Negative.    HENT: Negative.     Eyes: Negative.    Respiratory: Negative.     Cardiovascular: Negative.    Gastrointestinal: Negative.    Endocrine: Negative.    Genitourinary: Negative.    Musculoskeletal:  Positive for arthralgias, joint swelling and myalgias.   Skin: Negative.    Allergic/Immunologic: Negative.    Neurological: Negative.    Hematological: Negative.    Psychiatric/Behavioral: Negative.     All other systems reviewed and are negative.      Past Medical History:   Diagnosis Date    GERD (gastroesophageal reflux disease)     Hepatitis B     stated per patient    Liver failure (HCC)     Pneumothorax, spontaneous, tension     right side, Nov 2017 with chest tube     Past Surgical History:   Procedure Laterality Date    CHEST TUBE INSERTION Right 11/2017    spontaneous pneumo    ENDOMETRIAL ABLATION      THORACOSCOPY Left 11/20/2023    LEFT VATS THORACOSCOPY; LEFT UPPER LOBE WEDGE RESECTION; MECHANICAL AND CHEMICAL PLEURODESIS; PLEURECTOMY performed by Timbo Razo MD at [facility] OR    THORACOSCOPY Right 8/21/2024    RIGHT VATS THORACOSCOPY WITH BLEB RESECTION WITH TALC PLEURODESIS

## 2024-10-23 ENCOUNTER — OFFICE VISIT (OUTPATIENT)
Dept: ORTHOPEDIC SURGERY | Age: 34
End: 2024-10-23

## 2024-10-23 VITALS — HEIGHT: 60 IN | OXYGEN SATURATION: 93 % | WEIGHT: 110 LBS | BODY MASS INDEX: 21.6 KG/M2 | HEART RATE: 56 BPM

## 2024-10-23 DIAGNOSIS — S62.336A CLOSED DISPLACED FRACTURE OF NECK OF FIFTH METACARPAL BONE OF RIGHT HAND, INITIAL ENCOUNTER: Primary | ICD-10-CM

## 2024-10-23 RX ORDER — HYDROCODONE BITARTRATE AND ACETAMINOPHEN 5; 325 MG/1; MG/1
TABLET ORAL
COMMUNITY
Start: 2024-10-22

## 2024-10-23 ASSESSMENT — ENCOUNTER SYMPTOMS
ABDOMINAL PAIN: 0
SHORTNESS OF BREATH: 0
EYE REDNESS: 0
COLOR CHANGE: 0

## 2024-10-23 NOTE — PROGRESS NOTES
Anesthesia Start and Stop Event Times     Date Time Event    10/16/2020 0659 Ready for Procedure     0736 Anesthesia Start     0844 Anesthesia Stop        Responsible Staff  10/16/20    Name Role Begin End    Randy Arellano M.D. Anesth 0736 0844        Preop Diagnosis (Free Text):  Pre-op Diagnosis     OA CARPOMETACARPAL JOINT LEFT THUMB        Preop Diagnosis (Codes):    Post op Diagnosis  Localized primary osteoarthritis of carpometacarpal joint of left thumb      Premium Reason  Non-Premium    Comments:                                                                       
Patient presents to the office with a right hand injury. Pt stated that she was climbing and when she fell she banged her hand on the counter. Pt stated that her pain has been manageable with naproxen and denies much pain today. Pt stated her she has remained in the splint and nonweightbearing with no issues.   
  Skin:     General: Skin is warm and dry.      Capillary Refill: Capillary refill takes less than 2 seconds.      Coloration: Skin is not pale.      Findings: Bruising present. No erythema or rash.   Neurological:      Mental Status: She is alert and oriented to person, place, and time.      Sensory: No sensory deficit.     Right hand-skin intact, moderate diffuse ecchymosis, mild swelling.  She can actively flex and extend the small finger but decreased motion due to pain.  Rotational deformity noted to the small finger.    XRAY  X-ray 3 views of the right hand from October 20, 2024 reviewed by me today in the office demonstrates age appropriate bone density throughout with a transverse fracture through the fifth metacarpal neck with approximately 75 degrees of volar angulation.    XR HAND RIGHT (MIN 3 VIEWS)    Result Date: 10/23/2024  XRAY X-ray 3 views of the right hand obtained and reviewed by me today in the office demonstrates age appropriate bone density throughout with continued transverse fracture through the fifth metacarpal neck with improved alignment with approximately 35 degrees of volar angulation and no subluxation or dislocation noted, the rotational deformity appears improved compared to prior x-rays. Impression: Improved alignment of the right fifth metacarpal neck fracture                An electronic signature was used to authenticate this note.    --JENNIFER KESSLER,

## 2024-10-23 NOTE — PATIENT INSTRUCTIONS
Caller: JESS HUERTA     Relationship to patient: SELF    Best call back number: [phone]    Chief complaint: PT STATES SHE NEEDS TO R/S HER APT OUT TILL June FOR DR ORONA, AS PER DR ORONA,     Type of visit: F/U     Requested date: ANYTHING IN FIRST COUPLE OF WEEKS, IN THE MORNING     If rescheduling, when is the original appointment: MAY 12, 2021 @2:00 PM       Exos fitted today.  May remove for hygiene.  Ice and elevate as needed.  Tylenol or Motrin for pain.  Follow up in 1 month.

## 2024-11-20 ENCOUNTER — OFFICE VISIT (OUTPATIENT)
Dept: ORTHOPEDIC SURGERY | Age: 34
End: 2024-11-20

## 2024-11-20 VITALS — HEIGHT: 60 IN | HEART RATE: 74 BPM | WEIGHT: 110 LBS | BODY MASS INDEX: 21.6 KG/M2 | OXYGEN SATURATION: 98 %

## 2024-11-20 DIAGNOSIS — S62.336D CLOSED DISPLACED FRACTURE OF NECK OF FIFTH METACARPAL BONE OF RIGHT HAND WITH ROUTINE HEALING, SUBSEQUENT ENCOUNTER: Primary | ICD-10-CM

## 2024-11-20 PROCEDURE — 99024 POSTOP FOLLOW-UP VISIT: CPT | Performed by: ORTHOPAEDIC SURGERY

## 2024-11-20 ASSESSMENT — ENCOUNTER SYMPTOMS
COLOR CHANGE: 0
EYE REDNESS: 0
ABDOMINAL PAIN: 0
SHORTNESS OF BREATH: 0

## 2024-11-20 NOTE — PROGRESS NOTES
Pt returns to the office for follow up on right boxes fx. DOI: 10.19.24. Pt states her knuckle is still swollen and sore. Pt states she cannot bend her hand at all. Pt states she wears her brace at all times. Pt states she has no pain currently

## 2024-11-20 NOTE — PATIENT INSTRUCTIONS
Continue weight-bearing as tolerated.  Continue range of motion exercises as instructed.  Ice and elevate as needed.  Tylenol or Motrin for pain.  May come out of brace and return to activities.  May return back to work Monday

## 2024-11-20 NOTE — PROGRESS NOTES
Clarisse Arriaga (:  1990) is a 34 y.o. female,New patient, here for evaluation of the following chief complaint(s):  Follow-up and Injury (Right boxes fx. DOI: 10.19.24)         Assessment & Plan  Closed displaced fracture of neck of fifth metacarpal bone of right hand with routine healing, subsequent encounter     Right fifth metacarpal neck fracture, 4 weeks    I discussed with her today her x-ray findings.  I explained to her that her fracture remains stable and in acceptable alignment and has significant healing present.  At this point she can remove the fracture brace.  Begin active and passive range of motion exercises for the right hand as tolerated.  She can resume lifting, pushing, pulling as tolerated with the right hand.  I discussed with the patient today that their are symptoms are normal and should improve with time.  Continue weight-bearing as tolerated.  Continue range of motion exercises as instructed.  Ice and elevate as needed.  Tylenol or Motrin for pain.  Follow up will be as needed.  I will allow her to return to work on Monday with no restrictions           No follow-ups on file.       Subjective   Pt returns to the office for follow up on right boxes fx. DOI: 10.19.24. Pt states her knuckle is still swollen and sore. Pt states she cannot bend her hand at all. Pt states she wears her brace at all times. Pt states she has no pain currently    She comes in today for her 4-week recheck of her right fifth metacarpal fracture which we are treating conservatively.  She states that since her reduction here in the office she is doing much better and has been wearing her fracture brace and she is no longer having any pain in the right hand.  She is mostly complaining of stiffness in the fingers of her right hand   Patient denies any new injury to the involved extremity/ joint, denies numbness or tingling in the involved extremity and denies fever or chills.              Review of Systems

## 2025-02-02 ENCOUNTER — APPOINTMENT (OUTPATIENT)
Dept: GENERAL RADIOLOGY | Age: 35
End: 2025-02-02
Payer: MEDICAID

## 2025-02-02 ENCOUNTER — HOSPITAL ENCOUNTER (EMERGENCY)
Age: 35
Discharge: HOME | End: 2025-02-02
Attending: EMERGENCY MEDICINE
Payer: MEDICAID

## 2025-02-02 VITALS
WEIGHT: 112 LBS | RESPIRATION RATE: 16 BRPM | SYSTOLIC BLOOD PRESSURE: 119 MMHG | BODY MASS INDEX: 21.87 KG/M2 | DIASTOLIC BLOOD PRESSURE: 91 MMHG | TEMPERATURE: 98.1 F | OXYGEN SATURATION: 99 % | HEART RATE: 84 BPM

## 2025-02-02 DIAGNOSIS — J06.9 VIRAL UPPER RESPIRATORY TRACT INFECTION: Primary | ICD-10-CM

## 2025-02-02 LAB
INFLUENZA A BY PCR: NOT DETECTED
INFLUENZA B BY PCR: NOT DETECTED
SARS-COV-2 RDRP RESP QL NAA+PROBE: NOT DETECTED
SPECIMEN DESCRIPTION: NORMAL

## 2025-02-02 PROCEDURE — 87635 SARS-COV-2 COVID-19 AMP PRB: CPT

## 2025-02-02 PROCEDURE — 99284 EMERGENCY DEPT VISIT MOD MDM: CPT

## 2025-02-02 PROCEDURE — 71045 X-RAY EXAM CHEST 1 VIEW: CPT

## 2025-02-02 PROCEDURE — 6370000000 HC RX 637 (ALT 250 FOR IP): Performed by: EMERGENCY MEDICINE

## 2025-02-02 PROCEDURE — 87502 INFLUENZA DNA AMP PROBE: CPT

## 2025-02-02 RX ORDER — GUAIFENESIN AND DEXTROMETHORPHAN HYDROBROMIDE 600; 30 MG/1; MG/1
1 TABLET, EXTENDED RELEASE ORAL 2 TIMES DAILY
Qty: 28 TABLET | Refills: 0 | Status: SHIPPED | OUTPATIENT
Start: 2025-02-02

## 2025-02-02 RX ORDER — NAPROXEN 500 MG/1
500 TABLET ORAL 2 TIMES DAILY WITH MEALS
Qty: 60 TABLET | Refills: 0 | Status: SHIPPED | OUTPATIENT
Start: 2025-02-02

## 2025-02-02 RX ORDER — PREDNISONE 50 MG/1
50 TABLET ORAL DAILY
Qty: 4 TABLET | Refills: 0 | Status: SHIPPED | OUTPATIENT
Start: 2025-02-03 | End: 2025-02-07

## 2025-02-02 RX ORDER — PREDNISONE 20 MG/1
40 TABLET ORAL ONCE
Status: COMPLETED | OUTPATIENT
Start: 2025-02-02 | End: 2025-02-02

## 2025-02-02 RX ORDER — NAPROXEN 500 MG/1
500 TABLET ORAL ONCE
Status: COMPLETED | OUTPATIENT
Start: 2025-02-02 | End: 2025-02-02

## 2025-02-02 RX ADMIN — PREDNISONE 40 MG: 20 TABLET ORAL at 20:05

## 2025-02-02 RX ADMIN — GUAIFENESIN, DEXTROMETHORPHAN HBR 1 TABLET: 600; 30 TABLET ORAL at 20:05

## 2025-02-02 RX ADMIN — NAPROXEN 500 MG: 500 TABLET ORAL at 20:05

## 2025-02-02 ASSESSMENT — PAIN SCALES - GENERAL: PAINLEVEL_OUTOF10: 3

## 2025-02-02 ASSESSMENT — LIFESTYLE VARIABLES
HOW OFTEN DO YOU HAVE A DRINK CONTAINING ALCOHOL: NEVER
HOW MANY STANDARD DRINKS CONTAINING ALCOHOL DO YOU HAVE ON A TYPICAL DAY: PATIENT DOES NOT DRINK

## 2025-02-03 NOTE — ED NOTES
Discharge instructions and prescriptions were reviewed and the patient will follow up with the PCP. The patient voiced understanding. The patient was given a note for work.

## 2025-02-03 NOTE — ED PROVIDER NOTES
Triage Chief Complaint:   Cough and Headache (Since yesterday )    [place]:  Clarisse Arriaga is a 35 y.o. female that presents with cough and headache and bodyaches as well as sore throat.  Patient was in baseline state of health until yesterday when she developed a \"tickle in her throat\".  Subsequently patient developed a cough that is occasionally productive as well as body aches and a headache.  Patient is not currently taking anything for her symptoms.    Patient  is a former smoker.  Patient also with a history of pneumothoraces bilaterally status post pleurodesis remotely.  Patient does occasionally use inhaler when she gets sick.    Patient reports that she was supposed to work tonight and felt too ill and thus presented to the emergency department    ROS:  General:  No fevers, no chills, no weakness  Eyes:  No recent vison changes, no discharge  ENT:  + sore throat, no nasal congestion, no hearing changes  Cardiovascular:  No chest pain, no palpitations  Respiratory:  No shortness of breath, + cough, + wheezing  Gastrointestinal:  No pain, no nausea, no vomiting, no diarrhea  Musculoskeletal:  + muscle pain/body aches, no joint pain  Skin:  No rash, no pruritis, no easy bruising  Neurologic:  No speech problems, + headache, no extremity numbness, no extremity tingling, no extremity weakness  Psychiatric:  No anxiety  Genitourinary:  No dysuria, no hematuria  Endocrine:  No unexpected weight gain, no unexpected weight loss  Extremities:  no edema, no pain    Past Medical History:   Diagnosis Date    GERD (gastroesophageal reflux disease)     Hepatitis B     stated per patient    Liver failure (HCC)     Pneumothorax, spontaneous, tension     right side, Nov 2017 with chest tube     Past Surgical History:   Procedure Laterality Date    CHEST TUBE INSERTION Right 11/2017    spontaneous pneumo    ENDOMETRIAL ABLATION      THORACOSCOPY Left 11/20/2023    LEFT VATS THORACOSCOPY; LEFT UPPER LOBE WEDGE RESECTION;

## 2025-03-11 NOTE — ED NOTES
Tania Alegria is calling to request a refill on the following medication(s):    Last Visit Date (If Applicable):  5/21/2024    Next Visit Date:    Visit date not found    Medication Request:  Requested Prescriptions     Pending Prescriptions Disp Refills    gabapentin (NEURONTIN) 300 MG capsule [Pharmacy Med Name: GABAPENTIN 300 MG CAPSULE] 90 capsule 0     Sig: Take by mouth daily.               Report to [place]. Copy of chart, emtala, and transport sheet given. Pt to be transported to [facility] 137[facility]

## (undated) DEVICE — GLOVE SURG SZ 7 L12IN FNGR THK94MIL TRNSLUC YEL LTX HYDRGEL

## (undated) DEVICE — TOTAL TRAY, DB, 100% SILI FOLEY, 16FR 10: Brand: MEDLINE

## (undated) DEVICE — ECHELON 60MM REINFORCEMENT: Brand: ECHLEON

## (undated) DEVICE — RELOAD STPL L60MM H1.5-3.6MM REG TISS BLU GRIPPING SURF B

## (undated) DEVICE — CONTAINER,SPECIMEN,OR STERILE,4OZ: Brand: MEDLINE

## (undated) DEVICE — DRAIN SURG SGL COLL PT TB FOR ATS BG OASIS

## (undated) DEVICE — SPONGE DRN W4XL4IN RAYON/POLYESTER 6 PLY NONWOVEN PRECUT 2 PER PK

## (undated) DEVICE — GLOVE SURG SZ 8 L12IN THK75MIL DK GRN LTX FREE

## (undated) DEVICE — APPLICATOR PREP 26ML 0.7% IOD POVACRYLEX 74% ISO ALC ST

## (undated) DEVICE — CLEANER,CAUTERY TIP,2X2",STERILE: Brand: MEDLINE

## (undated) DEVICE — APPLICATOR MEDICATED 26 CC SOLUTION HI LT ORNG CHLORAPREP

## (undated) DEVICE — GAUZE,SPONGE,4"X4",8PLY,STRL,LF,10/TRAY: Brand: MEDLINE

## (undated) DEVICE — GAUZE,SPONGE,8"X4",12PLY,XRAY,STRL,LF: Brand: MEDLINE

## (undated) DEVICE — TROCAR: Brand: KII FIOS FIRST ENTRY

## (undated) DEVICE — SUTURE VICRYL SZ 3-0 L27IN ABSRB UD L26MM CT-2 1/2 CIR J232H

## (undated) DEVICE — NEEDLE, QUINCKE 22GX3.5": Brand: MEDLINE INDUSTRIES, INC.

## (undated) DEVICE — SUTURE VCRL SZ 3-0 L27IN ABSRB UD L26MM CT-2 1/2 CIR J232H

## (undated) DEVICE — COUNTER NDL 30 COUNT FOAM STRP SGL MAG

## (undated) DEVICE — TUBING INSUFFLATOR HEAT HI FLO SET PNEUMOCLEAR

## (undated) DEVICE — TISSUE RETRIEVAL SYSTEM: Brand: INZII RETRIEVAL SYSTEM

## (undated) DEVICE — SET TBNG DISP TIP FOR AHTO

## (undated) DEVICE — TOWEL,OR,DSP,ST,BLUE,STD,6/PK,12PK/CS: Brand: MEDLINE

## (undated) DEVICE — 3M™ STERI-DRAPE™ INSTRUMENT POUCH 1018L: Brand: STERI-DRAPE™

## (undated) DEVICE — INTENDED FOR TISSUE SEPARATION, AND OTHER PROCEDURES THAT REQUIRE A SHARP SURGICAL BLADE TO PUNCTURE OR CUT.: Brand: BARD-PARKER ® STAINLESS STEEL BLADES

## (undated) DEVICE — SUTURE NRLN SZ 1 L18IN NONABSORBABLE BLK L36MM CT-1 1/2 CIR C520D

## (undated) DEVICE — ELECTRODE ES L2.75IN S STL INSUL BLDE W/ SL EDGE

## (undated) DEVICE — LINER,SEMI-RIGID,3000CC,50EA/CS: Brand: MEDLINE

## (undated) DEVICE — PACK,UNIVERSAL,NO GOWNS: Brand: MEDLINE

## (undated) DEVICE — GOWN,ECLIPSE,POLYRNF,BRTHSLV,XL,30/CS: Brand: MEDLINE

## (undated) DEVICE — TUBING, SUCTION, 9/32" X 10', STRAIGHT: Brand: MEDLINE

## (undated) DEVICE — TRAP,MUCUS SPECIMEN,40CC: Brand: MEDLINE

## (undated) DEVICE — SHEET,DRAPE,53X77,STERILE: Brand: MEDLINE

## (undated) DEVICE — YANKAUER,BULB TIP,W/O VENT,RIGID,STERILE: Brand: MEDLINE

## (undated) DEVICE — 3M™ IOBAN™ 2 ANTIMICROBIAL INCISE DRAPE 6648EZ: Brand: IOBAN™ 2

## (undated) DEVICE — TROCAR: Brand: KII® SLEEVE

## (undated) DEVICE — KIT,ANTI FOG,W/SPONGE & FLUID,SOFT PACK: Brand: MEDLINE

## (undated) DEVICE — SYRINGE IRRIG 60ML SFT PLIABLE BLB EZ TO GRP 1 HND USE W/

## (undated) DEVICE — STAPLER 60MM POWERED ECHELON 3000  SHORT 340MM

## (undated) DEVICE — DRESSING TRNSPAR W2XL2.75IN FLM SHT SEMIPERMEABLE WIND

## (undated) DEVICE — 4-PORT MANIFOLD: Brand: NEPTUNE 2

## (undated) DEVICE — DRAPE,UTILITY,XL,4/PK,STERILE: Brand: MEDLINE

## (undated) DEVICE — GLIDESCOPE BFLEX 3.8 BRONCHOSCOPE

## (undated) DEVICE — SUTURE MCRYL SZ 3-0 L27IN ABSRB UD L24MM PS-1 3/8 CIR PRIM Y936H

## (undated) DEVICE — MARKER SURG SKIN UTIL REGULAR/FINE 2 TIP W/ RUL AND 9 LBL

## (undated) DEVICE — BLANKET WRM W40.2XL55.9IN IORT LO BODY + MISTRAL AIR

## (undated) DEVICE — PACK SURG LAP CHOLE

## (undated) DEVICE — BRONCHOSCOPE GS BFLEX 2 SLIM 3.8 SU

## (undated) DEVICE — STERILE LATEX POWDER FREE SURGICAL GLOVES WITH HYDROGEL COATING: Brand: PROTEXIS

## (undated) DEVICE — SUTURE VCRL SZ 2-0 L27IN ABSRB UD L26MM CT-2 1/2 CIR J269H

## (undated) DEVICE — CATHETER THOR 28FR L23CM DIA9.3MM POLYVI CHL TAPR CONN TIP

## (undated) DEVICE — NEEDLE HYPO 23GA L1.5IN TURQ POLYPR HUB S STL THN WALL IM

## (undated) DEVICE — PENCIL ES CRD L10FT HND SWCHING ROCK SWCH W/ EDGE COAT BLDE

## (undated) DEVICE — BANDAGE ADH W2XL4IN NITRL FAB STRP CURAD

## (undated) DEVICE — GLOVE ORANGE PI 8   MSG9080

## (undated) DEVICE — CATHETER THORACENTESIS STR 28 FRX23 IN 6 EYELET TAPR TIP LF - SEE COMMENT

## (undated) DEVICE — DRAPE,MEDI-SLUSH,STERILE: Brand: MEDLINE

## (undated) DEVICE — SUTURE VICRYL SZ 2-0 L27IN ABSRB UD L26MM CT-2 1/2 CIR J269H

## (undated) DEVICE — DRAPE TOWEL: Brand: CONVERTORS

## (undated) DEVICE — SOLUTION IV 1000ML 0.9% SOD CHL FOR IRRIG PLAS CONT

## (undated) DEVICE — LAPAROSCOPIC TROCAR SLEEVE/SINGLE USE: Brand: KII® OPTICAL ACCESS SYSTEM

## (undated) DEVICE — SPONGE LAP W18XL18IN WHT COT 4 PLY FLD STRUNG RADPQ DISP ST 2 PER PACK

## (undated) DEVICE — STRIP SKIN CLSR W0.25XL4IN WHT SPUNBOUND FBR NYL HI ADH

## (undated) DEVICE — Z DISCONTINUED USE 2220193 SUTURE VCRL SZ 4-0 L27IN ABSRB UD L19MM FS-2 3/8 CIR REV J422H

## (undated) DEVICE — GOWN,ECLIPSE,POLYRNF,BRTHSLV,L,30/CS: Brand: MEDLINE

## (undated) DEVICE — SCISSORS ENDOSCP DIA5MM CRV MPLR CAUT W/ RATCH HNDL

## (undated) DEVICE — CONNECTOR 1/4 STERILEAIGHT ST: Brand: CARDINAL HEALTH